# Patient Record
Sex: FEMALE | Race: WHITE | NOT HISPANIC OR LATINO | Employment: OTHER | URBAN - METROPOLITAN AREA
[De-identification: names, ages, dates, MRNs, and addresses within clinical notes are randomized per-mention and may not be internally consistent; named-entity substitution may affect disease eponyms.]

---

## 2022-03-10 ENCOUNTER — OFFICE VISIT (OUTPATIENT)
Dept: INTERNAL MEDICINE CLINIC | Facility: CLINIC | Age: 67
End: 2022-03-10
Payer: COMMERCIAL

## 2022-03-10 VITALS
WEIGHT: 151.4 LBS | BODY MASS INDEX: 23.76 KG/M2 | DIASTOLIC BLOOD PRESSURE: 92 MMHG | HEIGHT: 67 IN | TEMPERATURE: 98.4 F | HEART RATE: 85 BPM | SYSTOLIC BLOOD PRESSURE: 144 MMHG | RESPIRATION RATE: 16 BRPM | OXYGEN SATURATION: 97 %

## 2022-03-10 DIAGNOSIS — L40.50 PSORIATIC ARTHRITIS (HCC): ICD-10-CM

## 2022-03-10 DIAGNOSIS — I10 HYPERTENSION, ESSENTIAL, BENIGN: Primary | ICD-10-CM

## 2022-03-10 DIAGNOSIS — F51.04 PSYCHOPHYSIOLOGICAL INSOMNIA: ICD-10-CM

## 2022-03-10 DIAGNOSIS — L40.9 PSORIASIS: ICD-10-CM

## 2022-03-10 PROBLEM — I34.1 MITRAL VALVE PROLAPSE: Status: ACTIVE | Noted: 2022-03-10

## 2022-03-10 PROCEDURE — 1036F TOBACCO NON-USER: CPT | Performed by: INTERNAL MEDICINE

## 2022-03-10 PROCEDURE — 99204 OFFICE O/P NEW MOD 45 MIN: CPT | Performed by: INTERNAL MEDICINE

## 2022-03-10 PROCEDURE — 3008F BODY MASS INDEX DOCD: CPT | Performed by: INTERNAL MEDICINE

## 2022-03-10 RX ORDER — TURMERIC 400 MG
CAPSULE ORAL
COMMUNITY

## 2022-03-10 RX ORDER — DICYCLOMINE HYDROCHLORIDE 10 MG/1
CAPSULE ORAL
COMMUNITY

## 2022-03-10 RX ORDER — AMOXICILLIN 500 MG/1
CAPSULE ORAL
COMMUNITY

## 2022-03-10 RX ORDER — CLOBETASOL PROPIONATE 0.5 MG/G
CREAM TOPICAL 2 TIMES DAILY
COMMUNITY
End: 2022-04-05

## 2022-03-10 RX ORDER — ANTACID TABLETS 500 MG/1
TABLET, CHEWABLE ORAL
COMMUNITY

## 2022-03-10 RX ORDER — CHLORAL HYDRATE 500 MG
1 CAPSULE ORAL
COMMUNITY
End: 2022-04-05

## 2022-03-10 RX ORDER — TRAZODONE HYDROCHLORIDE 50 MG/1
25 TABLET ORAL
Qty: 90 TABLET | Refills: 1 | Status: SHIPPED | OUTPATIENT
Start: 2022-03-10

## 2022-03-10 RX ORDER — DILTIAZEM HYDROCHLORIDE 120 MG/1
120 CAPSULE, EXTENDED RELEASE ORAL DAILY
Qty: 90 CAPSULE | Refills: 1 | Status: SHIPPED | OUTPATIENT
Start: 2022-03-10 | End: 2022-04-05

## 2022-03-10 RX ORDER — UBIDECARENONE 100 MG
100 CAPSULE ORAL
COMMUNITY

## 2022-03-10 RX ORDER — DILTIAZEM HYDROCHLORIDE 120 MG/1
120 CAPSULE, EXTENDED RELEASE ORAL DAILY
COMMUNITY
Start: 2021-12-19 | End: 2022-03-10

## 2022-03-10 RX ORDER — CALCIUM CARBONATE/VITAMIN D3 500-10/5ML
LIQUID (ML) ORAL
COMMUNITY

## 2022-03-10 RX ORDER — TRAZODONE HYDROCHLORIDE 50 MG/1
25 TABLET ORAL
COMMUNITY
End: 2022-03-10 | Stop reason: SDUPTHER

## 2022-03-10 NOTE — PROGRESS NOTES
Assessment/Plan:    Hypertension, essential, benign  Controlled on diltiazem XR 120mg    Psoriatic arthritis (HCC)  Pain managed by dietary changes  She has been on MTX Humira    Psoriasis  Establishing with dermatology         Problem List Items Addressed This Visit        Cardiovascular and Mediastinum    Hypertension, essential, benign - Primary     Controlled on diltiazem XR 120mg         Relevant Medications    diltiazem (DILACOR XR) 120 MG 24 hr capsule    Other Relevant Orders    CBC and differential (Completed)    Comprehensive metabolic panel (Completed)    Lipid Panel with Direct LDL reflex (Completed)    UA w Reflex to Microscopic w Reflex to Culture (Completed)    Urine Microscopic (Completed)       Musculoskeletal and Integument    Psoriasis     Establishing with dermatology         Relevant Medications    clobetasol (TEMOVATE) 0 05 % cream    Pyrithione Zinc 0 25 % LIQD    Psoriatic arthritis (HCC)     Pain managed by dietary changes  She has been on MTX Humira         Relevant Medications    clobetasol (TEMOVATE) 0 05 % cream    Pyrithione Zinc 0 25 % LIQD    Other Relevant Orders    C-reactive protein (Completed)    Sedimentation rate, automated (Completed)       Other    Psychophysiological insomnia    Relevant Medications    traZODone (DESYREL) 50 mg tablet    Melatonin 5 MG CAPS            Subjective:      Patient ID: Cheryle Nunes is a 61 y o  female  HPI  Here to establish care  Psoriasis in her teens  Psoriatic arthritis diagnosed later and was placed on MTX which was not effective after long term use and in addition, she had infection  Humira caused hives  Stelara was costly   Eventually improved with dietary restrictions  Using topical tar products OTC  Sees dermatology in [place] but plans to see Dr Smith Mckeon in June  IBS both diarrhea and constipation controlled on psyllium  HTN on diltiazem ER 120mg  Dr Guerda Padron is ophthalmologoist  Colonoscopy in 2018 and recommended to repeat in 2025  Reports h/o  boils in the groin    The following portions of the patient's history were reviewed and updated as appropriate: allergies, current medications, past family history, past medical history, past social history, past surgical history and problem list     Review of Systems   Constitutional: Negative for chills and fever  Respiratory: Negative for shortness of breath  Cardiovascular: Negative for chest pain and palpitations  Gastrointestinal: Positive for constipation and diarrhea  Negative for abdominal pain  Genitourinary: Negative for difficulty urinating  Musculoskeletal: Negative for arthralgias  Neurological: Negative for dizziness and headaches  Psychiatric/Behavioral: Positive for sleep disturbance (takes melatonin and trazodone)  Objective:      /92   Pulse 85   Temp 98 4 °F (36 9 °C)   Resp 16   Ht 5' 6 5" (1 689 m)   Wt 68 7 kg (151 lb 6 4 oz)   SpO2 97%   BMI 24 07 kg/m²          Physical Exam  Constitutional:       General: She is not in acute distress  Appearance: She is well-developed  She is not ill-appearing, toxic-appearing or diaphoretic  HENT:      Head: Normocephalic and atraumatic  Eyes:      Conjunctiva/sclera: Conjunctivae normal    Cardiovascular:      Rate and Rhythm: Normal rate and regular rhythm  Heart sounds: Normal heart sounds  No murmur heard  Pulmonary:      Effort: Pulmonary effort is normal  No respiratory distress  Breath sounds: Normal breath sounds  No wheezing or rales  Abdominal:      General: There is no distension  Palpations: Abdomen is soft  There is no mass  Tenderness: There is no abdominal tenderness  There is no guarding or rebound  Musculoskeletal:      Cervical back: Neck supple  Right lower leg: No edema  Left lower leg: No edema     Skin:     Comments: Scaly pink plaques on her back, elbows   Neurological:      Mental Status: She is alert and oriented to person, place, and time  Psychiatric:         Mood and Affect: Mood normal          Behavior: Behavior normal          Thought Content:  Thought content normal          Judgment: Judgment normal

## 2022-03-14 ENCOUNTER — TELEPHONE (OUTPATIENT)
Dept: OTHER | Facility: OTHER | Age: 67
End: 2022-03-14

## 2022-03-14 ENCOUNTER — APPOINTMENT (OUTPATIENT)
Dept: LAB | Facility: CLINIC | Age: 67
End: 2022-03-14
Payer: COMMERCIAL

## 2022-03-14 LAB
ALBUMIN SERPL BCP-MCNC: 3.9 G/DL (ref 3.5–5)
ALP SERPL-CCNC: 79 U/L (ref 46–116)
ALT SERPL W P-5'-P-CCNC: 16 U/L (ref 12–78)
ANION GAP SERPL CALCULATED.3IONS-SCNC: 5 MMOL/L (ref 4–13)
AST SERPL W P-5'-P-CCNC: 14 U/L (ref 5–45)
BACTERIA UR QL AUTO: NORMAL /HPF
BASOPHILS # BLD AUTO: 0.05 THOUSANDS/ΜL (ref 0–0.1)
BASOPHILS NFR BLD AUTO: 1 % (ref 0–1)
BILIRUB SERPL-MCNC: 0.58 MG/DL (ref 0.2–1)
BILIRUB UR QL STRIP: NEGATIVE
BUN SERPL-MCNC: 13 MG/DL (ref 5–25)
CALCIUM SERPL-MCNC: 9.2 MG/DL (ref 8.3–10.1)
CHLORIDE SERPL-SCNC: 108 MMOL/L (ref 100–108)
CHOLEST SERPL-MCNC: 214 MG/DL
CLARITY UR: CLEAR
CO2 SERPL-SCNC: 26 MMOL/L (ref 21–32)
COLOR UR: ABNORMAL
CREAT SERPL-MCNC: 0.9 MG/DL (ref 0.6–1.3)
CRP SERPL QL: <3 MG/L
EOSINOPHIL # BLD AUTO: 0.06 THOUSAND/ΜL (ref 0–0.61)
EOSINOPHIL NFR BLD AUTO: 1 % (ref 0–6)
ERYTHROCYTE [DISTWIDTH] IN BLOOD BY AUTOMATED COUNT: 12.7 % (ref 11.6–15.1)
ERYTHROCYTE [SEDIMENTATION RATE] IN BLOOD: 9 MM/HOUR (ref 0–29)
GFR SERPL CREATININE-BSD FRML MDRD: 67 ML/MIN/1.73SQ M
GLUCOSE P FAST SERPL-MCNC: 101 MG/DL (ref 65–99)
GLUCOSE UR STRIP-MCNC: NEGATIVE MG/DL
HCT VFR BLD AUTO: 45.1 % (ref 34.8–46.1)
HDLC SERPL-MCNC: 104 MG/DL
HGB BLD-MCNC: 14.8 G/DL (ref 11.5–15.4)
HGB UR QL STRIP.AUTO: ABNORMAL
IMM GRANULOCYTES # BLD AUTO: 0.01 THOUSAND/UL (ref 0–0.2)
IMM GRANULOCYTES NFR BLD AUTO: 0 % (ref 0–2)
KETONES UR STRIP-MCNC: NEGATIVE MG/DL
LDLC SERPL CALC-MCNC: 97 MG/DL (ref 0–100)
LEUKOCYTE ESTERASE UR QL STRIP: NEGATIVE
LYMPHOCYTES # BLD AUTO: 1.54 THOUSANDS/ΜL (ref 0.6–4.47)
LYMPHOCYTES NFR BLD AUTO: 30 % (ref 14–44)
MCH RBC QN AUTO: 31.4 PG (ref 26.8–34.3)
MCHC RBC AUTO-ENTMCNC: 32.8 G/DL (ref 31.4–37.4)
MCV RBC AUTO: 96 FL (ref 82–98)
MONOCYTES # BLD AUTO: 0.49 THOUSAND/ΜL (ref 0.17–1.22)
MONOCYTES NFR BLD AUTO: 9 % (ref 4–12)
NEUTROPHILS # BLD AUTO: 3.06 THOUSANDS/ΜL (ref 1.85–7.62)
NEUTS SEG NFR BLD AUTO: 59 % (ref 43–75)
NITRITE UR QL STRIP: NEGATIVE
NON-SQ EPI CELLS URNS QL MICRO: NORMAL /HPF
NRBC BLD AUTO-RTO: 0 /100 WBCS
PH UR STRIP.AUTO: 6.5 [PH]
PLATELET # BLD AUTO: 323 THOUSANDS/UL (ref 149–390)
PMV BLD AUTO: 9.3 FL (ref 8.9–12.7)
POTASSIUM SERPL-SCNC: 3.9 MMOL/L (ref 3.5–5.3)
PROT SERPL-MCNC: 7.2 G/DL (ref 6.4–8.2)
PROT UR STRIP-MCNC: NEGATIVE MG/DL
RBC # BLD AUTO: 4.72 MILLION/UL (ref 3.81–5.12)
RBC #/AREA URNS AUTO: NORMAL /HPF
SODIUM SERPL-SCNC: 139 MMOL/L (ref 136–145)
SP GR UR STRIP.AUTO: 1.01 (ref 1–1.03)
TRIGL SERPL-MCNC: 63 MG/DL
UROBILINOGEN UR STRIP-ACNC: <2 MG/DL
WBC # BLD AUTO: 5.21 THOUSAND/UL (ref 4.31–10.16)
WBC #/AREA URNS AUTO: NORMAL /HPF

## 2022-03-14 PROCEDURE — 85025 COMPLETE CBC W/AUTO DIFF WBC: CPT | Performed by: INTERNAL MEDICINE

## 2022-03-14 PROCEDURE — 80061 LIPID PANEL: CPT | Performed by: INTERNAL MEDICINE

## 2022-03-14 PROCEDURE — 86140 C-REACTIVE PROTEIN: CPT | Performed by: INTERNAL MEDICINE

## 2022-03-14 PROCEDURE — 81001 URINALYSIS AUTO W/SCOPE: CPT | Performed by: INTERNAL MEDICINE

## 2022-03-14 PROCEDURE — 85652 RBC SED RATE AUTOMATED: CPT | Performed by: INTERNAL MEDICINE

## 2022-03-14 PROCEDURE — 36415 COLL VENOUS BLD VENIPUNCTURE: CPT | Performed by: INTERNAL MEDICINE

## 2022-03-14 PROCEDURE — 80053 COMPREHEN METABOLIC PANEL: CPT | Performed by: INTERNAL MEDICINE

## 2022-03-14 NOTE — TELEPHONE ENCOUNTER
Pt called in to discuss the results of her blood work and urinalysis  She is requesting a call back from the office

## 2022-03-15 PROBLEM — F51.04 PSYCHOPHYSIOLOGICAL INSOMNIA: Status: ACTIVE | Noted: 2022-03-15

## 2022-03-16 ENCOUNTER — TELEPHONE (OUTPATIENT)
Dept: ADMINISTRATIVE | Facility: OTHER | Age: 67
End: 2022-03-16

## 2022-03-16 NOTE — TELEPHONE ENCOUNTER
Upon review of the In Basket request we were able to locate, review, and update the patient chart as requested for Mammogram     Any additional questions or concerns should be emailed to the Practice Liaisons via [email or web address] email, please do not reply via In Basket      Thank you  Magdy Sánchez

## 2022-03-16 NOTE — TELEPHONE ENCOUNTER
----- Message from Paola Golden MD sent at 3/15/2022 11:28 PM EDT -----  03/15/22 11:28 PM    Hello, our patient Jordan Rodgers has had Mammogram completed/performed  Please assist in updating the patient chart by pulling the Care Everywhere (CE) document  The date of service is May 2021       Thank you,  Paola Golden MD  [facility]

## 2022-03-19 ENCOUNTER — OFFICE VISIT (OUTPATIENT)
Dept: URGENT CARE | Facility: CLINIC | Age: 67
End: 2022-03-19
Payer: COMMERCIAL

## 2022-03-19 VITALS
RESPIRATION RATE: 20 BRPM | TEMPERATURE: 99.5 F | SYSTOLIC BLOOD PRESSURE: 160 MMHG | HEART RATE: 67 BPM | WEIGHT: 150 LBS | HEIGHT: 66 IN | DIASTOLIC BLOOD PRESSURE: 97 MMHG | OXYGEN SATURATION: 99 % | BODY MASS INDEX: 24.11 KG/M2

## 2022-03-19 DIAGNOSIS — R30.0 DYSURIA: Primary | ICD-10-CM

## 2022-03-19 LAB
SL AMB  POCT GLUCOSE, UA: NEGATIVE
SL AMB LEUKOCYTE ESTERASE,UA: ABNORMAL
SL AMB POCT BILIRUBIN,UA: NEGATIVE
SL AMB POCT BLOOD,UA: ABNORMAL
SL AMB POCT CLARITY,UA: ABNORMAL
SL AMB POCT COLOR,UA: YELLOW
SL AMB POCT KETONES,UA: NEGATIVE
SL AMB POCT NITRITE,UA: NEGATIVE
SL AMB POCT PH,UA: 6.5
SL AMB POCT SPECIFIC GRAVITY,UA: 1.01
SL AMB POCT URINE PROTEIN: NEGATIVE
SL AMB POCT UROBILINOGEN: 0.2

## 2022-03-19 PROCEDURE — 87186 SC STD MICRODIL/AGAR DIL: CPT | Performed by: PREVENTIVE MEDICINE

## 2022-03-19 PROCEDURE — 87086 URINE CULTURE/COLONY COUNT: CPT | Performed by: PREVENTIVE MEDICINE

## 2022-03-19 PROCEDURE — 87077 CULTURE AEROBIC IDENTIFY: CPT | Performed by: PREVENTIVE MEDICINE

## 2022-03-19 PROCEDURE — 81002 URINALYSIS NONAUTO W/O SCOPE: CPT | Performed by: PREVENTIVE MEDICINE

## 2022-03-19 PROCEDURE — S9083 URGENT CARE CENTER GLOBAL: HCPCS | Performed by: PREVENTIVE MEDICINE

## 2022-03-19 PROCEDURE — G0382 LEV 3 HOSP TYPE B ED VISIT: HCPCS | Performed by: PREVENTIVE MEDICINE

## 2022-03-19 RX ORDER — PHENAZOPYRIDINE HYDROCHLORIDE 100 MG/1
100 TABLET, FILM COATED ORAL 3 TIMES DAILY PRN
Qty: 10 TABLET | Refills: 0 | Status: SHIPPED | OUTPATIENT
Start: 2022-03-19

## 2022-03-19 RX ORDER — DILTIAZEM HYDROCHLORIDE 120 MG/1
120 CAPSULE, EXTENDED RELEASE ORAL DAILY
COMMUNITY
Start: 2022-03-11

## 2022-03-19 RX ORDER — NITROFURANTOIN 25; 75 MG/1; MG/1
100 CAPSULE ORAL 2 TIMES DAILY
Qty: 10 CAPSULE | Refills: 0 | Status: SHIPPED | OUTPATIENT
Start: 2022-03-19 | End: 2022-03-24

## 2022-03-19 NOTE — PROGRESS NOTES
[facility]        NAME: Precious Villa is a 61 y o  female  : 1955    MRN: [id number]  DATE: 2022  TIME: 10:12 AM    Assessment and Plan   Dysuria [R30 0]  1  Dysuria  POCT urine dip    Urine culture         Patient Instructions       Follow up with PCP in 3-5 days  Proceed to  ER if symptoms worsen  Chief Complaint     Chief Complaint   Patient presents with    Urinary Tract Infection     started yesterday with urgency and burning with urination, slight flank pain and abdominal pressure, no meds taken, pt does have hx of UTI, no fevers or cold symptoms           History of Present Illness       Burning and stinging of urine x2 days  No back pain no fever  Review of Systems   Review of Systems   Genitourinary: Positive for dysuria and frequency  Negative for flank pain           Current Medications       Current Outpatient Medications:     amoxicillin (AMOXIL) 500 mg capsule, , Disp: , Rfl:     Calcium Carbonate 500 MG CHEW, Chew, Disp: , Rfl:     Cholecalciferol 50 MCG (2000 UT) CAPS, Take 2,000 Units by mouth, Disp: , Rfl:     clobetasol (TEMOVATE) 0 05 % cream, Apply topically 2 (two) times a day, Disp: , Rfl:     co-enzyme Q-10 100 mg capsule, Take 100 mg by mouth, Disp: , Rfl:     dicyclomine (BENTYL) 10 mg capsule, , Disp: , Rfl:     diltiazem (DILACOR XR) 120 MG 24 hr capsule, Take 1 capsule (120 mg total) by mouth daily, Disp: 90 capsule, Rfl: 1    FLUTICASONE PROPIONATE, NASAL, NA, , Disp: , Rfl:     Magnesium Oxide 400 MG CAPS, Take by mouth, Disp: , Rfl:     Melatonin 5 MG CAPS, Take 1 capsule (5 mg total) by mouth daily at bedtime as needed (sleep), Disp: , Rfl: 0    Omega-3 Fatty Acids (fish oil) 1,000 mg, Take 1 g by mouth, Disp: , Rfl:     Potassium 99 MG TABS, Take by mouth, Disp: , Rfl:     psyllium (METAMUCIL) 58 6 % packet, Take 1 packet by mouth daily, Disp: , Rfl:     Pyrithione Zinc 0 25 % LIQD, Apply topically, Disp: , Rfl:     Tiadylt ER 120 MG 24 hr capsule, Take 120 mg by mouth daily, Disp: , Rfl:     traZODone (DESYREL) 50 mg tablet, Take 0 5 tablets (25 mg total) by mouth daily at bedtime as needed for sleep, Disp: 90 tablet, Rfl: 1    Turmeric 400 MG CAPS, Take by mouth, Disp: , Rfl:     Current Allergies     Allergies as of 03/19/2022 - Reviewed 03/19/2022   Allergen Reaction Noted    Albumen, egg - food allergy Other (See Comments) 07/07/2021    Casein - food allergy Other (See Comments) 07/07/2021    Gluten meal - food allergy Other (See Comments) 07/07/2021    Milk-related compounds - food allergy Other (See Comments) 07/07/2021    Other Other (See Comments) 07/07/2021    Sulfa antibiotics Hives 03/10/2022            The following portions of the patient's history were reviewed and updated as appropriate: allergies, current medications, past family history, past medical history, past social history, past surgical history and problem list      Past Medical History:   Diagnosis Date    Psoriasis     Psoriatic arthritis ([facility] [address] )        Past Surgical History:   Procedure Laterality Date    JOINT REPLACEMENT Left 2016    LEFT OOPHORECTOMY Left     suspicous cyst that was benign       Family History   Problem Relation Age of Onset    Colon cancer Maternal Grandmother     Supraventricular tachycardia Mother     Psoriasis Mother     Psoriatic Arthritis Mother     Hypertension Mother     Dementia Mother     Squamous cell carcinoma Mother         skin         Medications have been verified  Objective   /97   Pulse 67   Temp 99 5 °F (37 5 °C) (Tympanic)   Resp 20   Ht 5' 6" (1 676 m)   Wt 68 kg (150 lb)   SpO2 99%   BMI 24 21 kg/m²   No LMP recorded         Physical Exam     Physical Exam  Genitourinary:     Comments: No CVA tenderness to percussion

## 2022-03-21 LAB — BACTERIA UR CULT: ABNORMAL

## 2022-03-25 ENCOUNTER — OFFICE VISIT (OUTPATIENT)
Dept: URGENT CARE | Facility: CLINIC | Age: 67
End: 2022-03-25
Payer: COMMERCIAL

## 2022-03-25 VITALS — WEIGHT: 150 LBS | BODY MASS INDEX: 24.11 KG/M2 | HEIGHT: 66 IN

## 2022-03-25 DIAGNOSIS — R39.9 UTI SYMPTOMS: Primary | ICD-10-CM

## 2022-03-25 PROCEDURE — 87086 URINE CULTURE/COLONY COUNT: CPT | Performed by: PHYSICIAN ASSISTANT

## 2022-03-25 PROCEDURE — 99213 OFFICE O/P EST LOW 20 MIN: CPT | Performed by: PHYSICIAN ASSISTANT

## 2022-03-25 NOTE — PROGRESS NOTES
[facility]        NAME: Cady Finch is a 61 y o  female  : 1955    MRN: [id number]  DATE: 2022  TIME: 11:07 AM    Assessment and Plan   UTI symptoms [R39 9]  1  UTI symptoms  Urine culture    CANCELED: POCT urine dip auto non-scope         Patient Instructions     Discussed symptoms with patient  She recently completed antibiotic therapy for UTI  Will send out culture to further evaluate for any further bacterial growth  In the interim, recommend hydration, rest, close observation  Follow up with PCP in 3-5 days  Proceed to  ER if symptoms worsen  Chief Complaint     Chief Complaint   Patient presents with    Possible UTI     "I think I got relief" from UTI last week  Last night noticed aa "heaviness" to suprapubic area and burning upon urination  History of Present Illness       Patient presents after previously being treated for UTI with oral antibiotics which she has finished  She reports that she felt better and then last night she started having heaviness in her pelvic area as well as dysuria  She denies any other significant symptoms at this time  She is trying her best adequately hydrate  Review of Systems   Review of Systems   Constitutional: Negative  Respiratory: Negative  Cardiovascular: Negative  Gastrointestinal: Negative  Genitourinary: Positive for dysuria and pelvic pain  Negative for flank pain, hematuria and vaginal discharge           Current Medications       Current Outpatient Medications:     amoxicillin (AMOXIL) 500 mg capsule, Prior to dental appts , Disp: , Rfl:     Calcium Carbonate 500 MG CHEW, Chew, Disp: , Rfl:     Cholecalciferol 50 MCG (2000) CAPS, Take 2,000 Units by mouth, Disp: , Rfl:     clobetasol (TEMOVATE) 0 05 % cream, Apply topically 2 (two) times a day, Disp: , Rfl:     co-enzyme Q-10 100 mg capsule, Take 100 mg by mouth, Disp: , Rfl:     dicyclomine (BENTYL) 10 mg capsule, , Disp: , Rfl:    diltiazem (DILACOR XR) 120 MG 24 hr capsule, Take 1 capsule (120 mg total) by mouth daily, Disp: 90 capsule, Rfl: 1    FLUTICASONE PROPIONATE, NASAL, NA, , Disp: , Rfl:     Magnesium Oxide 400 MG CAPS, Take by mouth, Disp: , Rfl:     Melatonin 5 MG CAPS, Take 1 capsule (5 mg total) by mouth daily at bedtime as needed (sleep), Disp: , Rfl: 0    Omega-3 Fatty Acids (fish oil) 1,000 mg, Take 1 g by mouth, Disp: , Rfl:     Potassium 99 MG TABS, Take by mouth, Disp: , Rfl:     psyllium (METAMUCIL) 58 6 % packet, Take 1 packet by mouth daily, Disp: , Rfl:     Tiadylt  MG 24 hr capsule, Take 120 mg by mouth daily, Disp: , Rfl:     traZODone (DESYREL) 50 mg tablet, Take 0 5 tablets (25 mg total) by mouth daily at bedtime as needed for sleep, Disp: 90 tablet, Rfl: 1    Turmeric 400 MG CAPS, Take by mouth, Disp: , Rfl:     phenazopyridine (PYRIDIUM) 100 mg tablet, Take 1 tablet (100 mg total) by mouth 3 (three) times a day as needed for bladder spasms (Patient not taking: Reported on 3/25/2022 ), Disp: 10 tablet, Rfl: 0    Pyrithione Zinc 0 25 % LIQD, Apply topically (Patient not taking: Reported on 3/25/2022 ), Disp: , Rfl:     Current Allergies     Allergies as of 03/25/2022 - Reviewed 03/25/2022   Allergen Reaction Noted    Albumen, egg - food allergy Other (See Comments) 07/07/2021    Casein - food allergy Other (See Comments) 07/07/2021    Gluten meal - food allergy Other (See Comments) 07/07/2021    Milk-related compounds - food allergy Other (See Comments) 07/07/2021    Other Other (See Comments) 07/07/2021    Sulfa antibiotics Hives 03/10/2022            The following portions of the patient's history were reviewed and updated as appropriate: allergies, current medications, past family history, past medical history, past social history, past surgical history and problem list      Past Medical History:   Diagnosis Date    Psoriasis     [facility]atic arthritis ([facility] [address] )        Past Surgical History: Procedure Laterality Date    JOINT REPLACEMENT Left 2016    LEFT OOPHORECTOMY Left     suspicous cyst that was benign       Family History   Problem Relation Age of Onset    Colon cancer Maternal Grandmother     Supraventricular tachycardia Mother     Psoriasis Mother     Psoriatic Arthritis Mother     Hypertension Mother     Dementia Mother     Squamous cell carcinoma Mother         skin         Medications have been verified  Objective   Ht 5' 6" (1 676 m)   Wt 68 kg (150 lb)   BMI 24 21 kg/m²   No LMP recorded  Physical Exam     Physical Exam  Vitals reviewed  Constitutional:       General: She is not in acute distress  Appearance: She is well-developed  HENT:      Mouth/Throat:      Mouth: Mucous membranes are moist       Pharynx: Oropharynx is clear  Cardiovascular:      Rate and Rhythm: Normal rate and regular rhythm  Pulses: Normal pulses  Heart sounds: Normal heart sounds  No murmur heard  Pulmonary:      Effort: Pulmonary effort is normal  No respiratory distress  Breath sounds: Normal breath sounds  Abdominal:      Tenderness: There is no right CVA tenderness or left CVA tenderness  Neurological:      Mental Status: She is alert and oriented to person, place, and time

## 2022-03-25 NOTE — PATIENT INSTRUCTIONS
Urinary Tract Infection in Women   WHAT YOU NEED TO KNOW:   A urinary tract infection (UTI) is caused by bacteria that get inside your urinary tract  Most bacteria that enter your urinary tract come out when you urinate  If the bacteria stay in your urinary tract, you may get an infection  Your urinary tract includes your kidneys, ureters, bladder, and urethra  Urine is made in your kidneys, and it flows from the ureters to the bladder  Urine leaves the bladder through the urethra  A UTI is more common in your lower urinary tract, which includes your bladder and urethra  DISCHARGE INSTRUCTIONS:   Return to the emergency department if:   · You are urinating very little or not at all  · You have a high fever with shaking chills  · You have side or back pain that gets worse  Call your doctor if:   · You have a fever  · You do not feel better after 2 days of taking antibiotics  · You are vomiting  · You have questions or concerns about your condition or care  Medicines:   · Antibiotics  help fight a bacterial infection  If you have UTIs often (called recurrent UTIs), you may be given antibiotics to take regularly  You will be given directions for when and how to use antibiotics  The goal is to prevent UTIs but not cause antibiotic resistance by using antibiotics too often  · Medicines  may be given to decrease pain and burning when you urinate  They will also help decrease the feeling that you need to urinate often  These medicines will make your urine orange or red  · Take your medicine as directed  Contact your healthcare provider if you think your medicine is not helping or if you have side effects  Tell him or her if you are allergic to any medicine  Keep a list of the medicines, vitamins, and herbs you take  Include the amounts, and when and why you take them  Bring the list or the pill bottles to follow-up visits   Carry your medicine list with you in case of an emergency  Follow up with your doctor as directed:  Write down your questions so you remember to ask them during your visits  Prevent another UTI:   · Empty your bladder often  Urinate and empty your bladder as soon as you feel the need  Do not hold your urine for long periods of time  · Wipe from front to back after you urinate or have a bowel movement  This will help prevent germs from getting into your urinary tract through your urethra  · Drink liquids as directed  Ask how much liquid to drink each day and which liquids are best for you  You may need to drink more liquids than usual to help flush out the bacteria  Do not drink alcohol, caffeine, or citrus juices  These can irritate your bladder and increase your symptoms  Your healthcare provider may recommend cranberry juice to help prevent a UTI  · Urinate after you have sex  This can help flush out bacteria passed during sex  · Do not douche or use feminine deodorants  These can change the chemical balance in your vagina  · Change sanitary pads or tampons often  This will help prevent germs from getting into your urinary tract  · Talk to your healthcare provider about your birth control method  You may need to change your method if it is increasing your risk for UTIs  · Wear cotton underwear and clothes that are loose  Tight pants and nylon underwear can trap moisture and cause bacteria to grow  · Vaginal estrogen may be recommended  This medicine helps prevent UTIs in women who have gone through menopause or are in feng-menopause  · Do pelvic muscle exercises often  Pelvic muscle exercises may help you start and stop urinating  Strong pelvic muscles may help you empty your bladder easier  Squeeze these muscles tightly for 5 seconds like you are trying to hold back urine  Then relax for 5 seconds  Gradually work up to squeezing for 10 seconds  Do 3 sets of 15 repetitions a day, or as directed      © Copyright [employer] 2022 Information is for [employer] use only and may not be sold, redistributed or otherwise used for commercial purposes  All illustrations and images included in CareNotes® are the copyrighted property of [employer]  or Traci Thakkar  The above information is an  only  It is not intended as medical advice for individual conditions or treatments  Talk to your doctor, nurse or pharmacist before following any medical regimen to see if it is safe and effective for you

## 2022-03-26 LAB — BACTERIA UR CULT: NORMAL

## 2022-04-05 ENCOUNTER — OFFICE VISIT (OUTPATIENT)
Dept: OBGYN CLINIC | Facility: CLINIC | Age: 67
End: 2022-04-05
Payer: COMMERCIAL

## 2022-04-05 VITALS
HEIGHT: 66 IN | SYSTOLIC BLOOD PRESSURE: 142 MMHG | WEIGHT: 152 LBS | DIASTOLIC BLOOD PRESSURE: 80 MMHG | BODY MASS INDEX: 24.43 KG/M2

## 2022-04-05 DIAGNOSIS — Z11.51 SCREENING FOR HPV (HUMAN PAPILLOMAVIRUS): ICD-10-CM

## 2022-04-05 DIAGNOSIS — Z01.419 WELL FEMALE EXAM WITH ROUTINE GYNECOLOGICAL EXAM: Primary | ICD-10-CM

## 2022-04-05 DIAGNOSIS — Z12.31 ENCOUNTER FOR SCREENING MAMMOGRAM FOR MALIGNANT NEOPLASM OF BREAST: ICD-10-CM

## 2022-04-05 DIAGNOSIS — Z12.4 SCREENING FOR MALIGNANT NEOPLASM OF CERVIX: ICD-10-CM

## 2022-04-05 PROBLEM — M21.611 BILATERAL BUNIONS: Status: ACTIVE | Noted: 2022-04-05

## 2022-04-05 PROBLEM — Z82.0 FAMILY HISTORY OF NEUROPATHY: Status: RESOLVED | Noted: 2022-04-05 | Resolved: 2022-04-05

## 2022-04-05 PROBLEM — Z82.49 FAMILY HISTORY OF HEART ATTACK: Status: ACTIVE | Noted: 2022-04-05

## 2022-04-05 PROBLEM — Z80.0 FAMILY HISTORY OF COLON CANCER: Status: ACTIVE | Noted: 2022-04-05

## 2022-04-05 PROBLEM — Z80.51 FAMILY HISTORY OF KIDNEY CANCER: Status: ACTIVE | Noted: 2022-04-05

## 2022-04-05 PROBLEM — Z83.79 FAMILY HISTORY OF GALLSTONES: Status: ACTIVE | Noted: 2022-04-05

## 2022-04-05 PROBLEM — R92.2 DENSE BREAST TISSUE: Status: ACTIVE | Noted: 2022-04-05

## 2022-04-05 PROBLEM — Z82.0 FAMILY HISTORY OF NEUROPATHY: Status: ACTIVE | Noted: 2022-04-05

## 2022-04-05 PROBLEM — Z96.659 HISTORY OF PARTIAL KNEE REPLACEMENT: Status: ACTIVE | Noted: 2022-04-05

## 2022-04-05 PROBLEM — Z83.518 FAMILY HISTORY OF MACULAR DEGENERATION: Status: ACTIVE | Noted: 2022-04-05

## 2022-04-05 PROBLEM — M21.612 BILATERAL BUNIONS: Status: ACTIVE | Noted: 2022-04-05

## 2022-04-05 PROBLEM — R92.30 DENSE BREAST TISSUE: Status: ACTIVE | Noted: 2022-04-05

## 2022-04-05 PROBLEM — Z82.5 FAMILY HISTORY OF COPD (CHRONIC OBSTRUCTIVE PULMONARY DISEASE): Status: ACTIVE | Noted: 2022-04-05

## 2022-04-05 PROBLEM — Z83.79 FAMILY HISTORY OF GALLSTONES: Status: RESOLVED | Noted: 2022-04-05 | Resolved: 2022-04-05

## 2022-04-05 PROCEDURE — G0476 HPV COMBO ASSAY CA SCREEN: HCPCS | Performed by: OBSTETRICS & GYNECOLOGY

## 2022-04-05 PROCEDURE — G0145 SCR C/V CYTO,THINLAYER,RESCR: HCPCS | Performed by: OBSTETRICS & GYNECOLOGY

## 2022-04-05 PROCEDURE — S0610 ANNUAL GYNECOLOGICAL EXAMINA: HCPCS | Performed by: OBSTETRICS & GYNECOLOGY

## 2022-04-05 PROCEDURE — 3008F BODY MASS INDEX DOCD: CPT | Performed by: INTERNAL MEDICINE

## 2022-04-05 NOTE — PROGRESS NOTES
ASSESSMENT & PLAN:   Diagnoses and all orders for this visit:    Well female exam with routine gynecological exam  -     Liquid-based pap, screening    Screening for malignant neoplasm of cervix  -     Liquid-based pap, screening    Screening for HPV (human papillomavirus)  -     Liquid-based pap, screening    Encounter for screening mammogram for malignant neoplasm of breast  -     Mammo screening bilateral w 3d & cad; Future          The following were reviewed in today's visit: ASCCP guidelines,  breast self exam, mammography screening ordered, menopause, exercise and healthy diet  Patient to return to office in yearly for annual exam      All questions have been answered to her satisfaction  CC:  Annual Gynecologic Examination  Chief Complaint   Patient presents with    [facility]     new pt     Gynecologic Exam     no records of pap, mammo, DXA or colonoscopy  HPI: Annabella Sneed is a 61 y o  [id number] who presents for annual gynecologic examination  She has the following concerns:  Dense breast tissue, occ abd pain      Health Maintenance:    Exercise: intermittently  Breast exams/breast awareness: no  Diet: well balanced diet  Last mammogram: 2021        Past Medical History:   Diagnosis Date    Heart valve disease     Mitro valve prolapse    Hypertension     Psoriasis     Psoriatic arthritis ([facility] [address]     Varicella        Past Surgical History:   Procedure Laterality Date    JOINT REPLACEMENT Left 2016    LEFT OOPHORECTOMY Left     benign cyst       Past OB/Gyn History:  Period Cycle (Days):  (n/a post menopausal)No LMP recorded  Patient is postmenopausal     Menopausal status: postmenopausal  Menopausal symptoms: None    Last Pap: 2020 : no abnormalities  History of abnormal Pap smear: yes - distant pass    Patient is not currently sexually active     STD testing: no  Current contraception: none      Family History  Family History   Problem Relation Age of Onset    Colon cancer Maternal Grandmother     Supraventricular tachycardia Mother     Psoriasis Mother     Psoriatic Arthritis Mother     Hypertension Mother     Dementia Mother     Squamous cell carcinoma Mother         skin    Hypertension Father        Family history of uterine or ovarian cancer: no  Family history of breast cancer: no  Family history of colon cancer: yes    Social History:  Social History     Socioeconomic History    Marital status:      Spouse name: Not on file    Number of children: Not on file    Years of education: Not on file    Highest education level: Not on file   Occupational History    Occupation: retired   Tobacco Use    Smoking status: Never Smoker    Smokeless tobacco: Never Used   Vaping Use    Vaping Use: Never used   Substance and Sexual Activity    Alcohol use: Yes     Alcohol/week: 1 0 standard drink     Types: 1 Glasses of wine per week     Comment: With dinner    Drug use: Not Currently     Comment: Many years ago    Sexual activity: Not Currently     Partners: Male     Birth control/protection: Post-menopausal   Other Topics Concern    Not on file   Social History Narrative    Not on file     Social Determinants of Health     Financial Resource Strain: Not on file   Food Insecurity: Not on file   Transportation Needs: Not on file   Physical Activity: Not on file   Stress: Not on file   Social Connections: Not on file   Intimate Partner Violence: Not on file   Housing Stability: Not on file     Domestic violence screen: negative    Allergies:   Allergies   Allergen Reactions    Albumen, Egg - Food Allergy Other (See Comments)    Casein - Food Allergy Other (See Comments)    Gluten Meal - Food Allergy Other (See Comments)    Milk-Related Compounds - Food Allergy Other (See Comments)    Other Other (See Comments)     SEASONAL    Sulfa Antibiotics Hives       Medications:    Current Outpatient Medications:     amoxicillin (AMOXIL) 500 mg capsule, Prior to dental appts , Disp: , Rfl:     Calcium Carbonate 500 MG CHEW, Chew, Disp: , Rfl:     Cholecalciferol 50 MCG (2000 UT) CAPS, Take 2,000 Units by mouth, Disp: , Rfl:     co-enzyme Q-10 100 mg capsule, Take 100 mg by mouth, Disp: , Rfl:     dicyclomine (BENTYL) 10 mg capsule, , Disp: , Rfl:     FLUTICASONE PROPIONATE, NASAL, NA, , Disp: , Rfl:     Magnesium Oxide 400 MG CAPS, Take by mouth, Disp: , Rfl:     Melatonin 5 MG CAPS, Take 1 capsule (5 mg total) by mouth daily at bedtime as needed (sleep), Disp: , Rfl: 0    phenazopyridine (PYRIDIUM) 100 mg tablet, Take 1 tablet (100 mg total) by mouth 3 (three) times a day as needed for bladder spasms, Disp: 10 tablet, Rfl: 0    Potassium 99 MG TABS, Take by mouth, Disp: , Rfl:     psyllium (METAMUCIL) 58 6 % packet, Take 1 packet by mouth daily, Disp: , Rfl:     Tiadylt  MG 24 hr capsule, Take 120 mg by mouth daily, Disp: , Rfl:     traZODone (DESYREL) 50 mg tablet, Take 0 5 tablets (25 mg total) by mouth daily at bedtime as needed for sleep, Disp: 90 tablet, Rfl: 1    Turmeric 400 MG CAPS, Take by mouth, Disp: , Rfl:     Review of Systems:  Review of Systems   Constitutional: Negative  HENT: Negative  Respiratory: Negative  Cardiovascular: Negative  Gastrointestinal: Negative  Genitourinary: Negative  Psychiatric/Behavioral: Negative  Physical Exam:  /80   Ht 5' 6" (1 676 m)   Wt 68 9 kg (152 lb)   Breastfeeding No   BMI 24 53 kg/m²    Physical Exam  Constitutional:       Appearance: Normal appearance  Genitourinary:      Bladder and urethral meatus normal       No lesions in the vagina  Right Labia: No rash, tenderness, lesions, skin changes or Bartholin's cyst      Left Labia: No tenderness, lesions, skin changes, Bartholin's cyst or rash  No vaginal erythema, tenderness or bleeding  Right Adnexa: not tender, not full and no mass present  Left Adnexa: not tender, not full and no mass present  Cervix is parous  No cervical discharge, lesion or polyp  Uterus is not enlarged, fixed or tender  No uterine mass detected  Urethral meatus caruncle present  No urethral tenderness or mass present  Breasts:      Right: No swelling, bleeding, inverted nipple, mass, nipple discharge, skin change or tenderness  Left: No swelling, bleeding, inverted nipple, mass, nipple discharge, skin change or tenderness  HENT:      Head: Normocephalic and atraumatic  Eyes:      Extraocular Movements: Extraocular movements intact  Conjunctiva/sclera: Conjunctivae normal       Pupils: Pupils are equal, round, and reactive to light  Cardiovascular:      Rate and Rhythm: Normal rate and regular rhythm  Heart sounds: Normal heart sounds  No murmur heard  Pulmonary:      Effort: Pulmonary effort is normal  No respiratory distress  Breath sounds: Normal breath sounds  No wheezing or rales  Abdominal:      General: There is no distension  Palpations: Abdomen is soft  Tenderness: There is no abdominal tenderness  There is no guarding  Neurological:      General: No focal deficit present  Mental Status: She is alert and oriented to person, place, and time  Skin:     General: Skin is warm and dry  Psychiatric:         Mood and Affect: Mood normal          Behavior: Behavior normal    Vitals and nursing note reviewed

## 2022-04-10 LAB
HPV HR 12 DNA CVX QL NAA+PROBE: NEGATIVE
HPV16 DNA CVX QL NAA+PROBE: NEGATIVE
HPV18 DNA CVX QL NAA+PROBE: NEGATIVE

## 2022-04-13 LAB
LAB AP GYN PRIMARY INTERPRETATION: NORMAL
Lab: NORMAL

## 2022-04-14 NOTE — RESULT ENCOUNTER NOTE
Neg pap, neg HPV - routine screening - released to [facility]

## 2022-04-27 ENCOUNTER — TELEPHONE (OUTPATIENT)
Dept: OTHER | Facility: OTHER | Age: 67
End: 2022-04-27

## 2022-04-27 NOTE — TELEPHONE ENCOUNTER
Pharmacy is calling in for prescription for patient for Flonase (generic)  Previously prescribed by provider in [place]; patient requesting PCP prescribe for her    Please follow up with pharmacy

## 2022-07-24 DIAGNOSIS — J30.9 ALLERGIC RHINITIS, UNSPECIFIED SEASONALITY, UNSPECIFIED TRIGGER: ICD-10-CM

## 2022-07-24 RX ORDER — FLUTICASONE PROPIONATE 50 MCG
SPRAY, SUSPENSION (ML) NASAL
Qty: 48 ML | Refills: 1 | Status: SHIPPED | OUTPATIENT
Start: 2022-07-24

## 2022-08-18 ENCOUNTER — OFFICE VISIT (OUTPATIENT)
Dept: URGENT CARE | Facility: CLINIC | Age: 67
End: 2022-08-18
Payer: COMMERCIAL

## 2022-08-18 VITALS
DIASTOLIC BLOOD PRESSURE: 98 MMHG | HEART RATE: 81 BPM | BODY MASS INDEX: 24.91 KG/M2 | SYSTOLIC BLOOD PRESSURE: 140 MMHG | TEMPERATURE: 98.5 F | HEIGHT: 66 IN | OXYGEN SATURATION: 98 % | WEIGHT: 155 LBS | RESPIRATION RATE: 16 BRPM

## 2022-08-18 DIAGNOSIS — J30.9 ALLERGIC RHINITIS, UNSPECIFIED SEASONALITY, UNSPECIFIED TRIGGER: ICD-10-CM

## 2022-08-18 DIAGNOSIS — J01.90 ACUTE SINUSITIS, RECURRENCE NOT SPECIFIED, UNSPECIFIED LOCATION: Primary | ICD-10-CM

## 2022-08-18 PROCEDURE — 99213 OFFICE O/P EST LOW 20 MIN: CPT | Performed by: PHYSICIAN ASSISTANT

## 2022-08-18 RX ORDER — AMOXICILLIN AND CLAVULANATE POTASSIUM 875; 125 MG/1; MG/1
1 TABLET, FILM COATED ORAL EVERY 12 HOURS SCHEDULED
Qty: 14 TABLET | Refills: 0 | Status: SHIPPED | OUTPATIENT
Start: 2022-08-18 | End: 2022-08-25

## 2022-08-18 NOTE — PROGRESS NOTES
[facility]        NAME: Javad Mccollum is a 61 y o  female  : 1955    MRN: [id number]  DATE: 2022  TIME: 1:45 PM    Assessment and Plan   Acute sinusitis, recurrence not specified, unspecified location [J01 90]  1  Acute sinusitis, recurrence not specified, unspecified location  amoxicillin-clavulanate (AUGMENTIN) 875-125 mg per tablet   2  Allergic rhinitis, unspecified seasonality, unspecified trigger           Patient Instructions     Discussed etiology is likely viral  If no improvement in 3-5 days may start abx  Take antibiotic as directed with food  Recommend probiotics for side effects  Continue with over-the-counter symptom relief - steroid nasal spray, antihistamine, etc   Monitor for worsening symptoms   Follow up with PCP in 3-5 days  Proceed to  ER if symptoms worsen  Chief Complaint     Chief Complaint   Patient presents with    Cold Like Symptoms     Pt reports cold like symptoms with post nasal drip for one month  States feeling fatigue for two days  Negative at home Covid test yesterday  Denies any fever  History of Present Illness       Patient w/ PMH significant for allergic rhinitis presents with complaint of PND x several days  She states that she has been experiencing persistent congestion and PND for at least a month in spite of taking allergy medication and the symptoms acutely worsened in the past few days  She denies fever, chills, sweats, chest tightness, dyspnea, abdominal pain, nausea, vomiting, diarrhea, and throat pain  She states that she just feels fatigued like she is getting sick  She denies recent sick contacts and travel  She reports restarting loratadine and flonase a few days ago  She reports testing negative for COVID19 at home  Review of Systems   Review of Systems   Constitutional: Negative for chills, diaphoresis and fever  HENT: Positive for congestion and postnasal drip   Negative for ear pain, sinus pressure, sinus pain and sore throat  Eyes: Negative for pain and visual disturbance  Respiratory: Negative for cough, chest tightness and shortness of breath  Cardiovascular: Negative for chest pain and palpitations  Gastrointestinal: Negative for abdominal pain and vomiting  Genitourinary: Negative for dysuria and hematuria  Musculoskeletal: Negative for arthralgias and back pain  Skin: Negative for color change and rash  Neurological: Negative for seizures and syncope  All other systems reviewed and are negative          Current Medications       Current Outpatient Medications:     amoxicillin-clavulanate (AUGMENTIN) 875-125 mg per tablet, Take 1 tablet by mouth every 12 (twelve) hours for 7 days, Disp: 14 tablet, Rfl: 0    co-enzyme Q-10 100 mg capsule, Take 100 mg by mouth, Disp: , Rfl:     fluticasone (FLONASE) 50 mcg/act nasal spray, SPRAY 2 SPRAYS INTO EACH NOSTRIL EVERY DAY, Disp: 48 mL, Rfl: 1    guselkumab (TREMFYA) subcutaneous injection, Inject 100 mg under the skin once, Disp: , Rfl:     Tiadylt  MG 24 hr capsule, Take 120 mg by mouth daily, Disp: , Rfl:     traZODone (DESYREL) 50 mg tablet, Take 0 5 tablets (25 mg total) by mouth daily at bedtime as needed for sleep, Disp: 90 tablet, Rfl: 1    Turmeric 400 MG CAPS, Take by mouth, Disp: , Rfl:     amoxicillin (AMOXIL) 500 mg capsule, Prior to dental appts  (Patient not taking: Reported on 8/18/2022), Disp: , Rfl:     Calcium Carbonate 500 MG CHEW, Chew (Patient not taking: Reported on 8/18/2022), Disp: , Rfl:     Cholecalciferol 50 MCG (2000 UT) CAPS, Take 2,000 Units by mouth, Disp: , Rfl:     dicyclomine (BENTYL) 10 mg capsule, , Disp: , Rfl:     Magnesium Oxide 400 MG CAPS, Take by mouth (Patient not taking: Reported on 8/18/2022), Disp: , Rfl:     Melatonin 5 MG CAPS, Take 1 capsule (5 mg total) by mouth daily at bedtime as needed (sleep) (Patient not taking: Reported on 8/18/2022), Disp: , Rfl: 0    phenazopyridine (PYRIDIUM) 100 mg tablet, Take 1 tablet (100 mg total) by mouth 3 (three) times a day as needed for bladder spasms, Disp: 10 tablet, Rfl: 0    Potassium 99 MG TABS, Take by mouth (Patient not taking: Reported on 8/18/2022), Disp: , Rfl:     psyllium (METAMUCIL) 58 6 % packet, Take 1 packet by mouth daily, Disp: , Rfl:     Current Allergies     Allergies as of 08/18/2022 - Reviewed 08/18/2022   Allergen Reaction Noted    Albumen, egg - food allergy Other (See Comments) 07/07/2021    Casein - food allergy Other (See Comments) 07/07/2021    Gluten meal - food allergy Other (See Comments) 07/07/2021    Milk-related compounds - food allergy Other (See Comments) 07/07/2021    Other Other (See Comments) 07/07/2021    Sulfa antibiotics Hives 03/10/2022            The following portions of the patient's history were reviewed and updated as appropriate: allergies, current medications, past family history, past medical history, past social history, past surgical history and problem list      Past Medical History:   Diagnosis Date    Heart valve disease     Mitro valve prolapse    Hypertension     Psoriasis     Psoriatic arthritis ([address] )     Varicella        Past Surgical History:   Procedure Laterality Date    JOINT REPLACEMENT Left 2016    LEFT OOPHORECTOMY Left     benign cyst       Family History   Problem Relation Age of Onset    Colon cancer Maternal Grandmother     Supraventricular tachycardia Mother     Psoriasis Mother     Psoriatic Arthritis Mother     Hypertension Mother     Dementia Mother     Squamous cell carcinoma Mother         skin    Hypertension Father          Medications have been verified  Objective   /98   Pulse 81   Temp 98 5 °F (36 9 °C)   Resp 16   Ht 5' 6" (1 676 m)   Wt 70 3 kg (155 lb)   SpO2 98%   BMI 25 02 kg/m²   No LMP recorded  Patient is postmenopausal        Physical Exam     Physical Exam  Vitals and nursing note reviewed     Constitutional:       General: She is not in acute distress  Appearance: Normal appearance  She is well-developed  She is not ill-appearing or diaphoretic  HENT:      Head: Normocephalic and atraumatic  Right Ear: Tympanic membrane, ear canal and external ear normal       Left Ear: Tympanic membrane, ear canal and external ear normal       Nose: Nose normal  No congestion or rhinorrhea  Mouth/Throat:      Mouth: Mucous membranes are moist       Pharynx: Oropharynx is clear  No oropharyngeal exudate or posterior oropharyngeal erythema  Comments: +PND  Eyes:      General:         Right eye: No discharge  Left eye: No discharge  Conjunctiva/sclera: Conjunctivae normal       Pupils: Pupils are equal, round, and reactive to light  Cardiovascular:      Rate and Rhythm: Normal rate and regular rhythm  Heart sounds: Normal heart sounds  Pulmonary:      Effort: Pulmonary effort is normal  No respiratory distress  Breath sounds: Normal breath sounds  No stridor  No wheezing, rhonchi or rales  Musculoskeletal:      Cervical back: Normal range of motion and neck supple  No rigidity or tenderness  Lymphadenopathy:      Cervical: No cervical adenopathy  Skin:     General: Skin is warm and dry  Capillary Refill: Capillary refill takes less than 2 seconds  Findings: No rash  Neurological:      Mental Status: She is alert and oriented to person, place, and time  Cranial Nerves: No cranial nerve deficit  Sensory: No sensory deficit  Psychiatric:         Behavior: Behavior normal          Thought Content:  Thought content normal

## 2022-08-29 ENCOUNTER — NURSE TRIAGE (OUTPATIENT)
Dept: OTHER | Facility: OTHER | Age: 67
End: 2022-08-29

## 2022-08-29 NOTE — TELEPHONE ENCOUNTER
Reason for Disposition   COVID-19 vaccine, Frequently Asked Questions (FAQs)    Answer Assessment - Initial Assessment Questions  1  MAIN CONCERN OR SYMPTOM:  "What is your main concern right now?" "What question do you have?" "What's the main symptom you're worried about?" (e g , fever, pain, redness, swelling)      I am scheduled for my booster tonight but I have an appointment on Thursday with the ENT,  and want to know if I will still have post vaccine symptoms on Thursday or if they will make me reschedule my appointment      Protocols used: CORONAVIRUS (COVID-19) VACCINE QUESTIONS AND REACTIONS-ADULT-

## 2022-08-29 NOTE — TELEPHONE ENCOUNTER
It is possible to have flu like symptoms for a few days after the vaccine  I do not think she needs to cancel her appointment with ENT for this  If she develops flu like symptoms, she can explain to them that it is from the vaccine

## 2022-08-29 NOTE — TELEPHONE ENCOUNTER
Regarding: Covid vaccine/booster questions  ----- Message from Lennox Uziel sent at 8/29/2022  3:48 PM EDT -----  "I had a Oleary Mi and Oleary Mi covid vaccine and then received their booster 11/2021    I would like to go to [facility] for another booster and I have questions "

## 2022-10-07 ENCOUNTER — OFFICE VISIT (OUTPATIENT)
Dept: CARDIOLOGY CLINIC | Facility: CLINIC | Age: 67
End: 2022-10-07
Payer: COMMERCIAL

## 2022-10-07 VITALS
HEIGHT: 66 IN | BODY MASS INDEX: 25.68 KG/M2 | WEIGHT: 159.8 LBS | DIASTOLIC BLOOD PRESSURE: 100 MMHG | SYSTOLIC BLOOD PRESSURE: 128 MMHG | HEART RATE: 72 BPM

## 2022-10-07 DIAGNOSIS — E78.00 PURE HYPERCHOLESTEROLEMIA: ICD-10-CM

## 2022-10-07 DIAGNOSIS — Z82.49 FAMILY HISTORY OF HEART ATTACK: ICD-10-CM

## 2022-10-07 DIAGNOSIS — I10 HYPERTENSION, ESSENTIAL, BENIGN: Primary | ICD-10-CM

## 2022-10-07 PROCEDURE — 99204 OFFICE O/P NEW MOD 45 MIN: CPT | Performed by: INTERNAL MEDICINE

## 2022-10-07 PROCEDURE — 93000 ELECTROCARDIOGRAM COMPLETE: CPT | Performed by: INTERNAL MEDICINE

## 2022-10-07 RX ORDER — LANOLIN ALCOHOL/MO/W.PET/CERES
CREAM (GRAM) TOPICAL
COMMUNITY

## 2022-10-07 RX ORDER — ACETAMINOPHEN 160 MG
TABLET,DISINTEGRATING ORAL
COMMUNITY

## 2022-10-07 RX ORDER — VITAMIN E 268 MG
CAPSULE ORAL
COMMUNITY

## 2022-10-07 RX ORDER — LUTEIN 10 MG
TABLET ORAL
COMMUNITY

## 2022-10-07 RX ORDER — DEXTROMETHORPHAN POLISTIREX 30 MG/5 ML
SUSPENSION, EXTENDED RELEASE 12 HR ORAL
COMMUNITY

## 2022-10-07 RX ORDER — PEDI MULTIVIT NO.12 W-FLUORIDE 0.25 MG
TABLET,CHEWABLE ORAL
COMMUNITY

## 2022-10-07 RX ORDER — ZEAXANTHIN 90 %
POWDER (GRAM) MISCELLANEOUS
COMMUNITY

## 2022-10-07 NOTE — PROGRESS NOTES
[facility]  Office Consultation  Jojo Hawley 61 y o  female MRN: [id number]        Problems    1  Hypertension, essential, benign  POCT ECG    CT coronary calcium score    VAS screening   2  Family history of heart attack  CT coronary calcium score    VAS screening   3  Pure hypercholesterolemia         Impression:       hypertension   uncontrolled, she suspects this might be office based   Consumes a low-sodium diet   Enjoys exercise   Not a significant amount of alcohol intake  Currently on diltiazem per prior PCP  Lipids   very high HDL of 104   lifetime nonsmoker   currently low ASCVD risk  Family history of stroke/CAD    Plan     home blood pressure checks for at least 2 weeks, morning and afternoon blood pressures, seated in a chair, feet on the ground, arm on the table, lytes down low, and resting for 5 minutes  Calcium score CT   Vascular triple screen   8 week follow-up      Reason for Consult / Principal Problem: Preventive care    HPI: Jojo Hawley is a 61y o  year old female  With hypertension, family history of SVT and stroke in her mother, CAD and COPD in her father, both long-time smokers, who herself has never smoked, drinks alcohol mild to moderately, but has been cutting down for a number of years, normally less than a drink every other day, with hypertension that is currently treated with diltiazem and has been for many years, with office based blood pressures which appear quite uncontrolled, averaging 140/95  She does not currently do home blood pressure checks to prove office based hypertension  She does suspect she has office based hypertension  She has a very high HDL of 104, LDL is lower than that, she has no personal history of atherosclerotic cardiovascular disease  She enjoys exercise, riding her bike, eats healthy, eats a low-sodium diet  She does have psoriatic arthritis    He reports a history of mitral valve prolapse from her young pregnant CD years, but this seems highly unlikely        Review of Systems   Constitutional: Negative for appetite change, diaphoresis, fatigue and fever  Respiratory: Negative for chest tightness, shortness of breath and wheezing  Cardiovascular: Negative for chest pain, palpitations and leg swelling  Gastrointestinal: Negative for abdominal pain and blood in stool  Musculoskeletal: Negative for arthralgias and joint swelling  Skin: Negative for rash  Neurological: Positive for dizziness ( rare episodes)  Negative for syncope and light-headedness  All other systems reviewed and are negative  Past Medical History:   Diagnosis Date    Heart valve disease     Mitro valve prolapse    Hypertension     Psoriasis     Psoriatic arthritis ([address] )     Varicella      Past Surgical History:   Procedure Laterality Date    JOINT REPLACEMENT Left 2016    LEFT OOPHORECTOMY Left     benign cyst     Social History     Substance and Sexual Activity   Alcohol Use Yes    Alcohol/week: 1 0 standard drink    Types: 1 Glasses of wine per week    Comment: With dinner     Social History     Substance and Sexual Activity   Drug Use Not Currently    Comment: Many years ago     Social History     Tobacco Use   Smoking Status Never Smoker   Smokeless Tobacco Never Used     Family History   Problem Relation Age of Onset    Colon cancer Maternal Grandmother     Supraventricular tachycardia Mother     Psoriasis Mother     Psoriatic Arthritis Mother     Hypertension Mother     Dementia Mother     Squamous cell carcinoma Mother         skin    Hypertension Father        Allergies:   Allergies   Allergen Reactions    Albumen, Egg - Food Allergy Other (See Comments)    Casein - Food Allergy Other (See Comments)    Gluten Meal - Food Allergy Other (See Comments)    Milk-Related Compounds - Food Allergy Other (See Comments)    Sulfa Antibiotics Hives       Medications:     Current Outpatient Medications:     Calcium-Vitamin D-Vitamin K (Calcium + D) 500-1000-40 MG-UNT-MCG CHEW, , Disp: , Rfl:     Cholecalciferol (Vitamin D3) 50 MCG (2000 UT) capsule, , Disp: , Rfl:     guselkumab (TREMFYA) subcutaneous injection, Inject 100 mg under the skin once, Disp: , Rfl:     Lutein 10 MG TABS, , Disp: , Rfl:     Omega-3 Fatty Acids (Omega-3 2100) 1050 MG CAPS, , Disp: , Rfl:     psyllium (METAMUCIL) 58 6 % packet, Take 1 packet by mouth daily, Disp: , Rfl:     Tiadylt  MG 24 hr capsule, Take 120 mg by mouth daily, Disp: , Rfl:     traZODone (DESYREL) 50 mg tablet, Take 0 5 tablets (25 mg total) by mouth daily at bedtime as needed for sleep, Disp: 90 tablet, Rfl: 1    Turmeric 400 MG CAPS, Take by mouth, Disp: , Rfl:     vitamin B-12 (VITAMIN B-12) 1,000 mcg tablet, , Disp: , Rfl:     Vitamin E 180 MG (400 UNIT) capsule, , Disp: , Rfl:     Zeaxanthin POWD, , Disp: , Rfl:     amoxicillin (AMOXIL) 500 mg capsule, Prior to dental appts  (Patient not taking: No sig reported), Disp: , Rfl:     fluticasone (FLONASE) 50 mcg/act nasal spray, SPRAY 2 SPRAYS INTO EACH NOSTRIL EVERY DAY (Patient not taking: No sig reported), Disp: 48 mL, Rfl: 1    omeprazole (PriLOSEC) 20 mg delayed release capsule, Take 1 capsule (20 mg total) by mouth every morning, Disp: 90 capsule, Rfl: 6      Vitals:    10/07/22 1024   BP: 128/100   Pulse: 72     Weight (last 2 days)     Date/Time Weight    10/07/22 1024 72 5 (159 8)        Physical Exam  Constitutional:       General: She is not in acute distress  Appearance: She is well-developed  She is not diaphoretic  HENT:      Head: Normocephalic and atraumatic  Eyes:      General: No scleral icterus  Conjunctiva/sclera: Conjunctivae normal       Pupils: Pupils are equal, round, and reactive to light  Neck:      Thyroid: No thyromegaly  Vascular: No JVD  Trachea: No tracheal deviation  Cardiovascular:      Rate and Rhythm: Normal rate and regular rhythm  Heart sounds: Normal heart sounds   No murmur heard  No friction rub  No gallop  Pulmonary:      Effort: Pulmonary effort is normal  No respiratory distress  Breath sounds: Normal breath sounds  No wheezing or rales  Abdominal:      General: Bowel sounds are normal  There is no distension  Palpations: Abdomen is soft  Tenderness: There is no abdominal tenderness  Musculoskeletal:         General: No tenderness or deformity  Normal range of motion  Cervical back: Normal range of motion and neck supple  Right lower leg: No edema  Left lower leg: No edema  Skin:     General: Skin is warm and dry  Findings: No erythema or rash  Neurological:      Mental Status: She is alert and oriented to person, place, and time  Cranial Nerves: No cranial nerve deficit  Psychiatric:         Judgment: Judgment normal            Laboratory Studies:    Laboratory studies personally reviewed    Cardiac testing:     EKG reviewed personally:   Results for orders placed or performed in visit on 10/07/22   POCT ECG    Impression     Normal sinus rhythm, normal EKG       Echocardiogram:      Stress test:      Holter:      Cardiac catheterization:        oHda Dahl MD    Portions of the record may have been created with voice recognition software  Occasional wrong word or "sound a like" substitutions may have occurred due to the inherent limitations of voice recognition software  Read the chart carefully and recognize, using context, where substitutions have occurred

## 2022-10-11 ENCOUNTER — HOSPITAL ENCOUNTER (OUTPATIENT)
Dept: RADIOLOGY | Facility: HOSPITAL | Age: 67
Discharge: HOME | End: 2022-10-11
Attending: OTOLARYNGOLOGY
Payer: COMMERCIAL

## 2022-10-11 DIAGNOSIS — R09.82 PND (POST-NASAL DRIP): ICD-10-CM

## 2022-10-11 PROCEDURE — 74220 X-RAY XM ESOPHAGUS 1CNTRST: CPT

## 2022-10-19 ENCOUNTER — HOSPITAL ENCOUNTER (OUTPATIENT)
Dept: RADIOLOGY | Facility: HOSPITAL | Age: 67
Discharge: HOME | End: 2022-10-19
Attending: INTERNAL MEDICINE
Payer: COMMERCIAL

## 2022-10-19 DIAGNOSIS — Z82.49 FAMILY HISTORY OF HEART ATTACK: ICD-10-CM

## 2022-10-19 DIAGNOSIS — I10 HYPERTENSION, ESSENTIAL, BENIGN: ICD-10-CM

## 2022-10-19 PROCEDURE — G1004 CDSM NDSC: HCPCS

## 2022-10-19 PROCEDURE — 75571 CT HRT W/O DYE W/CA TEST: CPT

## 2022-10-26 ENCOUNTER — HOSPITAL ENCOUNTER (OUTPATIENT)
Dept: NON INVASIVE DIAGNOSTICS | Facility: CLINIC | Age: 67
Discharge: HOME | End: 2022-10-26
Payer: COMMERCIAL

## 2022-10-26 DIAGNOSIS — Z82.49 FAMILY HISTORY OF HEART ATTACK: ICD-10-CM

## 2022-10-26 DIAGNOSIS — I10 HYPERTENSION, ESSENTIAL, BENIGN: ICD-10-CM

## 2022-10-26 PROCEDURE — 93922 UPR/L XTREMITY ART 2 LEVELS: CPT

## 2022-10-27 ENCOUNTER — HOSPITAL ENCOUNTER (OUTPATIENT)
Dept: MAMMOGRAPHY | Facility: IMAGING CENTER | Age: 67
Discharge: HOME | End: 2022-10-27
Payer: COMMERCIAL

## 2022-10-27 VITALS — WEIGHT: 159 LBS | HEIGHT: 66 IN | BODY MASS INDEX: 25.55 KG/M2

## 2022-10-27 DIAGNOSIS — Z12.31 ENCOUNTER FOR SCREENING MAMMOGRAM FOR MALIGNANT NEOPLASM OF BREAST: ICD-10-CM

## 2022-10-27 PROCEDURE — 77063 BREAST TOMOSYNTHESIS BI: CPT

## 2022-10-27 PROCEDURE — 77067 SCR MAMMO BI INCL CAD: CPT

## 2022-12-07 ENCOUNTER — OFFICE VISIT (OUTPATIENT)
Dept: CARDIOLOGY CLINIC | Facility: CLINIC | Age: 67
End: 2022-12-07

## 2022-12-07 VITALS
WEIGHT: 158 LBS | BODY MASS INDEX: 25.39 KG/M2 | HEART RATE: 77 BPM | HEIGHT: 66 IN | SYSTOLIC BLOOD PRESSURE: 130 MMHG | OXYGEN SATURATION: 96 % | DIASTOLIC BLOOD PRESSURE: 92 MMHG

## 2022-12-07 DIAGNOSIS — I34.1 MITRAL VALVE PROLAPSE: Primary | ICD-10-CM

## 2022-12-07 DIAGNOSIS — E78.00 PURE HYPERCHOLESTEROLEMIA: ICD-10-CM

## 2022-12-07 DIAGNOSIS — Z82.49 FAMILY HISTORY OF HEART ATTACK: ICD-10-CM

## 2022-12-07 DIAGNOSIS — I10 HYPERTENSION, ESSENTIAL, BENIGN: ICD-10-CM

## 2022-12-07 NOTE — PROGRESS NOTES
[facility]  Office Consultation  Riya Salvador 61 y o  female MRN: [id number]        Problems    1  Mitral valve prolapse        2  Pure hypercholesterolemia        3  Hypertension, essential, benign        4  Family history of heart attack            Impression:       hypertension  Office-based uncontrolled hypertension  She consumes a low-sodium diet  E she enjoys exercise  He does not have a significant amount of alcohol intake  Currently on she continues on diltiazem  She thinks her home blood pressures are quite normal, but she could not find her recordings, and will reassess again  Lipids   very high HDL of 104   lifetime nonsmoker  Her ASCVD risk remains low despite the elevated calcium score of 186  Her vascular triple screen was normal  Family history of stroke/CAD  Non premature    Plan    Reassess home blood pressures again, as long as less than 135/85, no reason to call back  Would not pursue any statin treatment at this time, with a low ASCVD score even though she has a intermediate elevated calcium score, her family history of CAD is not premature  She has a pretty healthy lifestyle, she avoids dairy, she eats fish and chicken  Annual follow-up for preventive care      HPI: Riya Salvador is a 61y o  year old female  With hypertension, family history of SVT and stroke in her mother, CAD and COPD in her father (not premature), both long-time smokers, who herself has never smoked, with mild alcohol intake, who came to see me for prevention  Office-based blood pressures are high, she takes diltiazem, she is pretty sure her home blood pressures were normal when she checked them previously, but she lost the paperwork and did not bring anything into the office for evaluation  She is willing to reassess  Lipids show high HDL of 104, her family history is not premature from a CAD standpoint, she underwent a calcium score of 186, but is otherwise physically active and asymptomatic    She has no new complaints for me today  She is plagued by arthritis, she avoids dairy for this reason  Review of Systems   Constitutional: Negative for appetite change, diaphoresis, fatigue and fever  Respiratory: Negative for chest tightness, shortness of breath and wheezing  Cardiovascular: Negative for chest pain, palpitations and leg swelling  Gastrointestinal: Negative for abdominal pain and blood in stool  Musculoskeletal: Positive for arthralgias  Negative for joint swelling  Skin: Negative for rash  Neurological: Negative for dizziness, syncope and light-headedness  Past Medical History:   Diagnosis Date   • Heart valve disease     Mitro valve prolapse   • Hypertension    • Psoriasis    • Psoriatic arthritis ([facility] [address] )    • Varicella      Past Surgical History:   Procedure Laterality Date   • JOINT REPLACEMENT Left 2016   • LEFT OOPHORECTOMY Left 2007    benign cyst   • OOPHORECTOMY       Social History     Substance and Sexual Activity   Alcohol Use Yes   • Alcohol/week: 1 0 standard drink   • Types: 1 Glasses of wine per week    Comment: With dinner     Social History     Substance and Sexual Activity   Drug Use Not Currently    Comment: Many years ago     Social History     Tobacco Use   Smoking Status Never   Smokeless Tobacco Never     Family History   Problem Relation Age of Onset   • Supraventricular tachycardia Mother    • Psoriasis Mother    • Psoriatic Arthritis Mother    • Hypertension Mother    • Dementia Mother    • Squamous cell carcinoma Mother         skin- unknown age   • Hypertension Father    • Kidney cancer Sister         19's   • No Known Problems Sister    • No Known Problems Daughter    • Colon cancer Maternal Grandmother 70   • No Known Problems Maternal Grandfather    • No Known Problems Paternal Grandmother    • Prostate cancer Paternal Grandfather         80's   • No Known Problems Maternal Aunt    • No Known Problems Maternal Aunt        Allergies:   Allergies Allergen Reactions   • Albumen, Egg - Food Allergy Other (See Comments)   • Casein - Food Allergy Other (See Comments)   • Gluten Meal - Food Allergy Other (See Comments)   • Milk-Related Compounds - Food Allergy Other (See Comments)   • Sulfa Antibiotics Hives       Medications:     Current Outpatient Medications:   •  amoxicillin (AMOXIL) 500 mg capsule, Prior to dental appts, Disp: , Rfl:   •  Calcium-Vitamin D-Vitamin K (Calcium + D) 500-1000-40 MG-UNT-MCG CHEW, , Disp: , Rfl:   •  Cholecalciferol (Vitamin D3) 50 MCG (2000 UT) capsule, , Disp: , Rfl:   •  guselkumab (TREMFYA) subcutaneous injection, Inject 100 mg under the skin every 2 (two) months, Disp: , Rfl:   •  Omega-3 Fatty Acids (Omega-3 2100) 1050 MG CAPS, , Disp: , Rfl:   •  omeprazole (PriLOSEC) 20 mg delayed release capsule, Take 1 capsule (20 mg total) by mouth every morning, Disp: 90 capsule, Rfl: 6  •  psyllium (METAMUCIL) 58 6 % packet, Take 1 packet by mouth daily, Disp: , Rfl:   •  Tiadylt  MG 24 hr capsule, Take 120 mg by mouth daily, Disp: , Rfl:   •  traZODone (DESYREL) 50 mg tablet, Take 0 5 tablets (25 mg total) by mouth daily at bedtime as needed for sleep, Disp: 90 tablet, Rfl: 1  •  Turmeric 400 MG CAPS, Take by mouth, Disp: , Rfl:   •  vitamin B-12 (VITAMIN B-12) 1,000 mcg tablet, , Disp: , Rfl:   •  Vitamin E 180 MG (400 UNIT) capsule, , Disp: , Rfl:   •  fluticasone (FLONASE) 50 mcg/act nasal spray, SPRAY 2 SPRAYS INTO EACH NOSTRIL EVERY DAY (Patient not taking: Reported on 10/7/2022), Disp: 48 mL, Rfl: 1  •  Lutein 10 MG TABS, , Disp: , Rfl:   •  Zeaxanthin POWD, , Disp: , Rfl:       Vitals:    12/07/22 1044   BP: 130/92   Pulse: 77   SpO2: 96%     Weight (last 2 days)     Date/Time Weight    12/07/22 1044 71 7 (158)        Physical Exam  Constitutional:       General: She is not in acute distress  Appearance: She is not diaphoretic  HENT:      Head: Normocephalic and atraumatic     Eyes:      General: No scleral icterus  Conjunctiva/sclera: Conjunctivae normal    Neck:      Vascular: No JVD  Cardiovascular:      Rate and Rhythm: Normal rate and regular rhythm  Heart sounds: Normal heart sounds  No murmur heard  Pulmonary:      Effort: Pulmonary effort is normal  No respiratory distress  Breath sounds: Normal breath sounds  No wheezing, rhonchi or rales  Musculoskeletal:         General: No tenderness  Cervical back: Normal range of motion  Right lower leg: Normal  No edema  Left lower leg: Normal  No edema  Skin:     General: Skin is warm and dry  Laboratory Studies:    Laboratory studies personally reviewed    Cardiac testing:     EKG reviewed personally:   No results found for this visit on 12/07/22  Echocardiogram:      Stress test:      Holter:       Calcium score CT  2022-186    Vascular  2022-vascular triple screen-normal      Cardiac catheterization:        Wili Rosario MD    Portions of the record may have been created with voice recognition software  Occasional wrong word or "sound a like" substitutions may have occurred due to the inherent limitations of voice recognition software  Read the chart carefully and recognize, using context, where substitutions have occurred

## 2022-12-07 NOTE — PATIENT INSTRUCTIONS
Your calcium score on your CAT scan was 183, this is an intermediate range result, combined with your family history which does not sound like truly premature heart disease, and combined with your high good cholesterol of 104, I do not think there is a strong need to treat your cholesterol with statin therapy  Try to focus on just healthy lifestyle, active lifestyle, and significant reduction in saturated fats from meat and dairy  From a blood pressure standpoint, as long as your blood pressure at home is less than 135/85, I would be happy

## 2022-12-30 ENCOUNTER — RA CDI HCC (OUTPATIENT)
Dept: OTHER | Facility: HOSPITAL | Age: 67
End: 2022-12-30

## 2022-12-30 NOTE — PROGRESS NOTES
[facility] [address]  coding opportunities       Chart reviewed, no opportunity found: CHART REVIEWED, NO OPPORTUNITY FOUND        Patients Insurance     Medicare Insurance: Medicare

## 2023-01-05 RX ORDER — CHLORAL HYDRATE 500 MG
1000 CAPSULE ORAL DAILY
COMMUNITY
Start: 2023-01-01

## 2023-01-09 ENCOUNTER — OFFICE VISIT (OUTPATIENT)
Dept: INTERNAL MEDICINE CLINIC | Facility: CLINIC | Age: 68
End: 2023-01-09

## 2023-01-09 VITALS
WEIGHT: 158.6 LBS | HEIGHT: 66 IN | RESPIRATION RATE: 14 BRPM | BODY MASS INDEX: 25.49 KG/M2 | TEMPERATURE: 99.1 F | DIASTOLIC BLOOD PRESSURE: 70 MMHG | SYSTOLIC BLOOD PRESSURE: 130 MMHG

## 2023-01-09 DIAGNOSIS — Z13.820 SCREENING FOR OSTEOPOROSIS: ICD-10-CM

## 2023-01-09 DIAGNOSIS — E78.00 PURE HYPERCHOLESTEROLEMIA: ICD-10-CM

## 2023-01-09 DIAGNOSIS — E53.8 B12 DEFICIENCY: ICD-10-CM

## 2023-01-09 DIAGNOSIS — L40.50 PSORIATIC ARTHRITIS (HCC): ICD-10-CM

## 2023-01-09 DIAGNOSIS — E55.9 VITAMIN D DEFICIENCY: ICD-10-CM

## 2023-01-09 DIAGNOSIS — N95.9 UNSPECIFIED MENOPAUSAL AND PERIMENOPAUSAL DISORDER: ICD-10-CM

## 2023-01-09 DIAGNOSIS — I10 HYPERTENSION, ESSENTIAL, BENIGN: ICD-10-CM

## 2023-01-09 DIAGNOSIS — Z00.00 WELCOME TO MEDICARE PREVENTIVE VISIT: Primary | ICD-10-CM

## 2023-01-09 DIAGNOSIS — Z23 ENCOUNTER FOR IMMUNIZATION: ICD-10-CM

## 2023-01-09 NOTE — PROGRESS NOTES
Assessment and Plan:   Discussed supplements/vitamind  Continue D and Ca, fish oil and turmeric  No indication for B12 and E  Suspect OA in the hands but since there is no pain, will observe for now  Problem List Items Addressed This Visit        Cardiovascular and Mediastinum    Hypertension, essential, benign    Relevant Orders    CBC and differential    Comprehensive metabolic panel       Musculoskeletal and Integument    Psoriatic arthritis (HCC)    Relevant Orders    C-reactive protein    Sedimentation rate, automated       Other    Pure hypercholesterolemia     She saw cardiology CAC CT score 183, normal vascular screening doppler  She is not on a statin at this time  Continue to monitor         Relevant Orders    Lipid Panel with Direct LDL reflex   Other Visit Diagnoses     Welcome to Medicare preventive visit    -  Primary    Encounter for immunization        Relevant Orders    Pneumococcal Conjugate Vaccine 20-valent (PCV20) (Completed)    Screening for osteoporosis        Relevant Orders    DXA bone density spine hip and pelvis    Vitamin D deficiency        Relevant Orders    Vitamin D 25 hydroxy    B12 deficiency        Relevant Orders    Vitamin B12    Unspecified menopausal and perimenopausal disorder        Relevant Orders    DXA bone density spine hip and pelvis        BMI Counseling: Body mass index is 25 99 kg/m²  The BMI is above normal  Nutrition recommendations include encouraging healthy choices of fruits and vegetables, moderation in carbohydrate intake and reducing intake of saturated and trans fat  Exercise recommendations include exercising 3-5 times per week  Rationale for BMI follow-up plan is due to patient being overweight or obese  Depression Screening and Follow-up Plan: Patient was screened for depression during today's encounter  They screened negative with a PHQ-2 score of 0        Preventive health issues were discussed with patient, and age appropriate screening tests were ordered as noted in patient's After Visit Summary  Personalized health advice and appropriate referrals for health education or preventive services given if needed, as noted in patient's After Visit Summary  History of Present Illness:     Patient presents for a Medicare Wellness Visit    HPI   Patient Care Team:  Merlinda Abraham, MD as PCP - General (Internal Medicine)     Review of Systems:     Review of Systems   Constitutional: Negative for chills and unexpected weight change  HENT: Positive for postnasal drip and trouble swallowing (globus sensation, improved on omeprazole)  Respiratory: Negative for shortness of breath  Cardiovascular: Negative for chest pain and palpitations  Gastrointestinal: Negative for abdominal pain, blood in stool, constipation, diarrhea, nausea and vomiting  Genitourinary: Negative for difficulty urinating  Musculoskeletal: Positive for arthralgias (improved on Tremfya; deformity of the CMC bilaterally without pain)  Neurological: Negative for dizziness and headaches          Problem List:     Patient Active Problem List   Diagnosis   • Psoriasis   • Psoriatic arthritis ([facility] [address] )   • Hypertension, essential, benign   • Mitral valve prolapse   • Irritable bowel syndrome   • Psychophysiological insomnia   • Allergic rhinitis   • Bilateral bunions   • Dense breast tissue   • Family history of kidney cancer   • Family history of colon cancer   • Family history of COPD (chronic obstructive pulmonary disease)   • Family history of heart attack   • Family history of macular degeneration   • History of partial knee replacement   • Pure hypercholesterolemia      Past Medical and Surgical History:     Past Medical History:   Diagnosis Date   • Heart valve disease     Mitro valve prolapse   • Hypertension    • Psoriasis    • Psoriatic arthritis ([facility] [address] )    • Varicella      Past Surgical History:   Procedure Laterality Date   • JOINT REPLACEMENT Left 2016   • LEFT OOPHORECTOMY Left 2007 benign cyst   • OOPHORECTOMY        Family History:     Family History   Problem Relation Age of Onset   • Stroke Mother    • Supraventricular tachycardia Mother    • Psoriasis Mother    • Psoriatic Arthritis Mother    • Hypertension Mother    • Dementia Mother    • Squamous cell carcinoma Mother         skin- unknown age   • Hypertension Father    • Kidney cancer Sister         19's   • No Known Problems Sister    • No Known Problems Daughter    • Colon cancer Maternal Grandmother 70   • No Known Problems Maternal Grandfather    • No Known Problems Paternal Grandmother    • Prostate cancer Paternal Grandfather         80's   • No Known Problems Maternal Aunt    • No Known Problems Maternal Aunt       Social History:     Social History     Socioeconomic History   • Marital status:      Spouse name: None   • Number of children: None   • Years of education: None   • Highest education level: None   Occupational History   • Occupation: retired   Tobacco Use   • Smoking status: Never   • Smokeless tobacco: Never   Vaping Use   • Vaping Use: Never used   Substance and Sexual Activity   • Alcohol use: Yes     Alcohol/week: 1 0 standard drink     Types: 1 Glasses of wine per week     Comment: With dinner   • Drug use: Not Currently     Comment: Many years ago   • Sexual activity: Not Currently     Partners: Male     Birth control/protection: Post-menopausal   Other Topics Concern   • None   Social History Narrative   • None     Social Determinants of Health     Financial Resource Strain: Low Risk    • Difficulty of Paying Living Expenses: Not hard at all   Food Insecurity: Not on file   Transportation Needs: No Transportation Needs   • Lack of Transportation (Medical): No   • Lack of Transportation (Non-Medical):  No   Physical Activity: Not on file   Stress: Not on file   Social Connections: Not on file   Intimate Partner Violence: Not on file   Housing Stability: Not on file      Medications and Allergies: Current Outpatient Medications   Medication Sig Dispense Refill   • Calcium-Vitamin D-Vitamin K (Calcium + D) 500-1000-40 MG-UNT-MCG CHEW      • Cholecalciferol (Vitamin D3) 50 MCG (2000 UT) capsule      • fluticasone (FLONASE) 50 mcg/act nasal spray SPRAY 2 SPRAYS INTO EACH NOSTRIL EVERY DAY (Patient taking differently: if needed) 48 mL 1   • guselkumab (TREMFYA) subcutaneous injection Inject 100 mg under the skin every 2 (two) months Medication on hold until insurance clearance     • Omega-3 Fatty Acids (fish oil) 1,000 mg      • omeprazole (PriLOSEC) 20 mg delayed release capsule Take 1 capsule (20 mg total) by mouth every morning 90 capsule 6   • psyllium (METAMUCIL) 58 6 % packet Take 1 packet by mouth 2 (two) times a day     • Tiadylt  MG 24 hr capsule Take 120 mg by mouth daily     • traZODone (DESYREL) 50 mg tablet Take 0 5 tablets (25 mg total) by mouth daily at bedtime as needed for sleep 90 tablet 1   • Turmeric 400 MG CAPS Take by mouth     • amoxicillin (AMOXIL) 500 mg capsule Prior to dental appts (Patient not taking: Reported on 1/9/2023)       No current facility-administered medications for this visit       Allergies   Allergen Reactions   • Albumen, Egg - Food Allergy Other (See Comments)   • Casein - Food Allergy Other (See Comments)   • Gluten Meal - Food Allergy Other (See Comments)   • Milk-Related Compounds - Food Allergy Other (See Comments)   • Sulfa Antibiotics Hives      Immunizations:     Immunization History   Administered Date(s) Administered   • COVID-19 J&J ([employer]) vaccine 0 5 mL 03/08/2021, 11/24/2021   • COVID-19 Pfizer vac (Dylan-sucrose, gray cap) 12 yr+ IM 08/29/2022   • INFLUENZA 10/01/2015, 10/01/2018, 10/07/2022   • Influenza LAIV (Nasal) 10/20/2008   • Influenza Quadrivalent 3 years and older 10/23/2021   • Influenza Split 09/20/2011   • Influenza, seasonal, injectable, preservative free 10/17/2016, 09/10/2020   • Pneumococcal Conjugate Vaccine 20-valent (Pcv20), Polysace 01/09/2023   • Pneumococcal Polysaccharide PPV23 04/03/2000, 01/14/2009   • Td (adult), adsorbed 01/01/2002   • Tdap 05/10/2012   • Zoster Vaccine Recombinant 02/27/2020, 06/16/2020      Health Maintenance:         Topic Date Due   • Colorectal Cancer Screening  Never done   • HIV Screening  02/09/2023 (Originally 12/18/1970)   • Hepatitis C Screening  01/09/2024 (Originally 1955)   • Breast Cancer Screening: Mammogram  10/27/2023         Topic Date Due   • Hepatitis B Vaccine (1 of 3 - 3-dose series) Never done   • COVID-19 Vaccine (4 - Booster for Yas series) 10/24/2022      Medicare Screening Tests and Risk Assessments:     Marichuy De Anda is here for her Welcome to Medicare visit  Health Risk Assessment:   Patient rates overall health as good  Patient feels that their physical health rating is slightly worse  Patient is satisfied with their life  Eyesight was rated as slightly worse  Hearing was rated as same  Patient feels that their emotional and mental health rating is same  Patients states they are never, rarely angry  Patient states they are sometimes unusually tired/fatigued  Pain experienced in the last 7 days has been some  Patient's pain rating has been 1/10  Patient states that she has experienced no weight loss or gain in last 6 months  Depression Screening:   PHQ-2 Score: 0      Fall Risk Screening: In the past year, patient has experienced: no history of falling in past year      Urinary Incontinence Screening:   Patient has leaked urine accidently in the last six months  Home Safety:  Patient does not have trouble with stairs inside or outside of their home  Patient has working smoke alarms and has working carbon monoxide detector  Home safety hazards include: none  Nutrition:   Current diet is Low Cholesterol, Low Saturated Fat and Limited junk food  limited eggs, dairy, and gluten    Medications:   Patient is currently taking over-the-counter supplements   OTC medications include: see list of meds  Patient is able to manage medications  Activities of Daily Living (ADLs)/Instrumental Activities of Daily Living (IADLs):   Walk and transfer into and out of bed and chair?: Yes  Dress and groom yourself?: Yes    Bathe or shower yourself?: Yes    Feed yourself? Yes  Do your laundry/housekeeping?: Yes  Manage your money, pay your bills and track your expenses?: Yes  Make your own meals?: Yes    Do your own shopping?: Yes    Previous Hospitalizations:   Any hospitalizations or ED visits within the last 12 months?: No      Advance Care Planning:   Living will: Yes    Durable POA for healthcare: No    Advanced directive: Yes      Cognitive Screening:   Provider or family/friend/caregiver concerned regarding cognition?: No    PREVENTIVE SCREENINGS      Cardiovascular Screening:    General: Screening Not Indicated and History Lipid Disorder      Diabetes Screening:     General: Screening Current      Colorectal Cancer Screening:     General: Screening Current      Breast Cancer Screening:     General: Screening Current      Cervical Cancer Screening:    General: Screening Not Indicated      Osteoporosis Screening:    General: Risks and Benefits Discussed    Due for: DXA Axial      Abdominal Aortic Aneurysm (AAA) Screening:        General: Screening Not Indicated      Lung Cancer Screening:     General: Screening Not Indicated      Hepatitis C Screening:    General: Screening Current    Screening, Brief Intervention, and Referral to Treatment (SBIRT)    Screening  Typical number of drinks in a day: 3  Typical number of drinks in a week: 3  Interpretation: Low risk drinking behavior  AUDIT-C Screenin) How often did you have a drink containing alcohol in the past year? 2 to 3 times a week  2) How many drinks did you have on a typical day when you were drinking in the past year? 1 to 2  3) How often did you have 6 or more drinks on one occasion in the past year? never    AUDIT-C Score: 3  Interpretation: Score 3-12 (female): POSITIVE screen for alcohol misuse    Single Item Drug Screening:  How often have you used an illegal drug (including marijuana) or a prescription medication for non-medical reasons in the past year? never    Single Item Drug Screen Score: 0  Interpretation: Negative screen for possible drug use disorder    Vision Screening    Right eye Left eye Both eyes   Without correction      With correction 20/25 20/20 20/20        Physical Exam:     /70 (BP Location: Left arm, Patient Position: Sitting, Cuff Size: Large)   Temp 99 1 °F (37 3 °C) (Tympanic)   Resp 14   Ht 5' 5 5" (1 664 m)   Wt 71 9 kg (158 lb 9 6 oz)   BMI 25 99 kg/m²     Physical Exam  Constitutional:       General: She is not in acute distress  Appearance: She is well-developed  She is not ill-appearing, toxic-appearing or diaphoretic  HENT:      Head: Normocephalic and atraumatic  Right Ear: External ear normal  There is no impacted cerumen  Left Ear: External ear normal  There is no impacted cerumen  Eyes:      Conjunctiva/sclera: Conjunctivae normal    Cardiovascular:      Rate and Rhythm: Normal rate and regular rhythm  Heart sounds: Normal heart sounds  No murmur heard  Pulmonary:      Effort: Pulmonary effort is normal  No respiratory distress  Breath sounds: Normal breath sounds  No stridor  No wheezing or rales  Abdominal:      General: There is no distension  Palpations: Abdomen is soft  There is no mass  Tenderness: There is no abdominal tenderness  There is no guarding or rebound  Musculoskeletal:         General: Deformity (bilateral 1st CMC ) present  Cervical back: Neck supple  Right lower leg: No edema  Left lower leg: No edema  Neurological:      Mental Status: She is alert and oriented to person, place, and time     Psychiatric:         Mood and Affect: Mood normal          Behavior: Behavior normal  Thought Content:  Thought content normal          Judgment: Judgment normal           Corinne Ferries, MD

## 2023-01-09 NOTE — PATIENT INSTRUCTIONS
Medicare Preventive Visit Patient Instructions  Thank you for completing your Welcome to Medicare Visit or Medicare Annual Wellness Visit today  Your next wellness visit will be due in one year (1/10/2024)  The screening/preventive services that you may require over the next 5-10 years are detailed below  Some tests may not apply to you based off risk factors and/or age  Screening tests ordered at today's visit but not completed yet may show as past due  Also, please note that scanned in results may not display below  Preventive Screenings:  Service Recommendations Previous Testing/Comments   Colorectal Cancer Screening  * Colonoscopy    * Fecal Occult Blood Test (FOBT)/Fecal Immunochemical Test (FIT)  * Fecal DNA/Cologuard Test  * Flexible Sigmoidoscopy Age: 39-70 years old   Colonoscopy: every 10 years (may be performed more frequently if at higher risk)  OR  FOBT/FIT: every 1 year  OR  Cologuard: every 3 years  OR  Sigmoidoscopy: every 5 years  Screening may be recommended earlier than age 39 if at higher risk for colorectal cancer  Also, an individualized decision between you and your healthcare provider will decide whether screening between the ages of 74-80 would be appropriate  Colonoscopy: 08/22/2018  FOBT/FIT: Not on file  Cologuard: Not on file  Sigmoidoscopy: Not on file          Breast Cancer Screening Age: 36 years old  Frequency: every 1-2 years  Not required if history of left and right mastectomy Mammogram: 10/27/2022        Cervical Cancer Screening Between the ages of 21-29, pap smear recommended once every 3 years  Between the ages of 33-67, can perform pap smear with HPV co-testing every 5 years     Recommendations may differ for women with a history of total hysterectomy, cervical cancer, or abnormal pap smears in past  Pap Smear: 04/05/2022        Hepatitis C Screening Once for adults born between 1945 and 1965  More frequently in patients at high risk for Hepatitis C Hep C Antibody: Not on file        Diabetes Screening 1-2 times per year if you're at risk for diabetes or have pre-diabetes Fasting glucose: 101 mg/dL (3/14/2022)  A1C: No results in last 5 years (No results in last 5 years)      Cholesterol Screening Once every 5 years if you don't have a lipid disorder  May order more often based on risk factors  Lipid panel: 03/14/2022          Other Preventive Screenings Covered by Medicare:  1  Abdominal Aortic Aneurysm (AAA) Screening: covered once if your at risk  You're considered to be at risk if you have a family history of AAA  2  Lung Cancer Screening: covers low dose CT scan once per year if you meet all of the following conditions: (1) Age 50-69; (2) No signs or symptoms of lung cancer; (3) Current smoker or have quit smoking within the last 15 years; (4) You have a tobacco smoking history of at least 20 pack years (packs per day multiplied by number of years you smoked); (5) You get a written order from a healthcare provider  3  Glaucoma Screening: covered annually if you're considered high risk: (1) You have diabetes OR (2) Family history of glaucoma OR (3)  aged 48 and older OR (3)  American aged 72 and older  3  Osteoporosis Screening: covered every 2 years if you meet one of the following conditions: (1) You're estrogen deficient and at risk for osteoporosis based off medical history and other findings; (2) Have a vertebral abnormality; (3) On glucocorticoid therapy for more than 3 months; (4) Have primary hyperparathyroidism; (5) On osteoporosis medications and need to assess response to drug therapy  · Last bone density test (DXA Scan): Not on file  5  HIV Screening: covered annually if you're between the age of 12-76  Also covered annually if you are younger than 13 and older than 72 with risk factors for HIV infection  For pregnant patients, it is covered up to 3 times per pregnancy      Immunizations:  Immunization Recommendations   Influenza Vaccine Annual influenza vaccination during flu season is recommended for all persons aged >= 6 months who do not have contraindications   Pneumococcal Vaccine   * Pneumococcal conjugate vaccine = PCV13 (Prevnar 13), PCV15 (Vaxneuvance), PCV20 (Prevnar 20)  * Pneumococcal polysaccharide vaccine = PPSV23 (Pneumovax) Adults 25-60 years old: 1-3 doses may be recommended based on certain risk factors  Adults 72 years old: 1-2 doses may be recommended based off what pneumonia vaccine you previously received   Hepatitis B Vaccine 3 dose series if at intermediate or high risk (ex: diabetes, end stage renal disease, liver disease)   Tetanus (Td) Vaccine - COST NOT COVERED BY MEDICARE PART B Following completion of primary series, a booster dose should be given every 10 years to maintain immunity against tetanus  Td may also be given as tetanus wound prophylaxis  Tdap Vaccine - COST NOT COVERED BY MEDICARE PART B Recommended at least once for all adults  For pregnant patients, recommended with each pregnancy  Shingles Vaccine (Shingrix) - COST NOT COVERED BY MEDICARE PART B  2 shot series recommended in those aged 48 and above     Health Maintenance Due:      Topic Date Due   • Colorectal Cancer Screening  Never done   • HIV Screening  02/09/2023 (Originally 12/18/1970)   • Hepatitis C Screening  01/09/2024 (Originally 1955)   • Breast Cancer Screening: Mammogram  10/27/2023     Immunizations Due:      Topic Date Due   • Hepatitis B Vaccine (1 of 3 - 3-dose series) Never done   • Pneumococcal Vaccine: 65+ Years (2 - PCV) 01/14/2010   • COVID-19 Vaccine (4 - Booster for Yas series) 10/24/2022     Advance Directives   What are advance directives? Advance directives are legal documents that state your wishes and plans for medical care  These plans are made ahead of time in case you lose your ability to make decisions for yourself   Advance directives can apply to any medical decision, such as the treatments you want, and if you want to donate organs  What are the types of advance directives? There are many types of advance directives, and each state has rules about how to use them  You may choose a combination of any of the following:  · Living will: This is a written record of the treatment you want  You can also choose which treatments you do not want, which to limit, and which to stop at a certain time  This includes surgery, medicine, IV fluid, and tube feedings  · Durable power of  for healthcare [facility] [place]): This is a written record that states who you want to make healthcare choices for you when you are unable to make them for yourself  This person, called a proxy, is usually a family member or a friend  You may choose more than 1 proxy  · Do not resuscitate (DNR) order:  A DNR order is used in case your heart stops beating or you stop breathing  It is a request not to have certain forms of treatment, such as CPR  A DNR order may be included in other types of advance directives  · Medical directive: This covers the care that you want if you are in a coma, near death, or unable to make decisions for yourself  You can list the treatments you want for each condition  Treatment may include pain medicine, surgery, blood transfusions, dialysis, IV or tube feedings, and a ventilator (breathing machine)  · Values history: This document has questions about your views, beliefs, and how you feel and think about life  This information can help others choose the care that you would choose  Why are advance directives important? An advance directive helps you control your care  Although spoken wishes may be used, it is better to have your wishes written down  Spoken wishes can be misunderstood, or not followed  Treatments may be given even if you do not want them  An advance directive may make it easier for your family to make difficult choices about your care     Weight Management   Why it is important to manage your weight:  Being overweight increases your risk of health conditions such as heart disease, high blood pressure, type 2 diabetes, and certain types of cancer  It can also increase your risk for osteoarthritis, sleep apnea, and other respiratory problems  Aim for a slow, steady weight loss  Even a small amount of weight loss can lower your risk of health problems  How to lose weight safely:  A safe and healthy way to lose weight is to eat fewer calories and get regular exercise  You can lose up about 1 pound a week by decreasing the number of calories you eat by 500 calories each day  Healthy meal plan for weight management:  A healthy meal plan includes a variety of foods, contains fewer calories, and helps you stay healthy  A healthy meal plan includes the following:  · Eat whole-grain foods more often  A healthy meal plan should contain fiber  Fiber is the part of grains, fruits, and vegetables that is not broken down by your body  Whole-grain foods are healthy and provide extra fiber in your diet  Some examples of whole-grain foods are whole-wheat breads and pastas, oatmeal, brown rice, and bulgur  · Eat a variety of vegetables every day  Include dark, leafy greens such as spinach, kale, mila greens, and mustard greens  Eat yellow and orange vegetables such as carrots, sweet potatoes, and winter squash  · Eat a variety of fruits every day  Choose fresh or canned fruit (canned in its own juice or light syrup) instead of juice  Fruit juice has very little or no fiber  · Eat low-fat dairy foods  Drink fat-free (skim) milk or 1% milk  Eat fat-free yogurt and low-fat cottage cheese  Try low-fat cheeses such as mozzarella and other reduced-fat cheeses  · Choose meat and other protein foods that are low in fat  Choose beans or other legumes such as split peas or lentils  Choose fish, skinless poultry (chicken or turkey), or lean cuts of red meat (beef or pork)   Before you cook meat or poultry, cut off any visible fat  · Use less fat and oil  Try baking foods instead of frying them  Add less fat, such as margarine, sour cream, regular salad dressing and mayonnaise to foods  Eat fewer high-fat foods  Some examples of high-fat foods include french fries, doughnuts, ice cream, and cakes  · Eat fewer sweets  Limit foods and drinks that are high in sugar  This includes candy, cookies, regular soda, and sweetened drinks  Exercise:  Exercise at least 30 minutes per day on most days of the week  Some examples of exercise include walking, biking, dancing, and swimming  You can also fit in more physical activity by taking the stairs instead of the elevator or parking farther away from stores  Ask your healthcare provider about the best exercise plan for you  © Copyright [employer] 2018 Information is for End User's use only and may not be sold, redistributed or otherwise used for commercial purposes  All illustrations and images included in CareNotes® are the copyrighted property of [employer]  or [facility] Preventive Visit Patient Instructions  Thank you for completing your Welcome to Medicare Visit or Medicare Annual Wellness Visit today  Your next wellness visit will be due in one year (1/10/2024)  The screening/preventive services that you may require over the next 5-10 years are detailed below  Some tests may not apply to you based off risk factors and/or age  Screening tests ordered at today's visit but not completed yet may show as past due  Also, please note that scanned in results may not display below    Preventive Screenings:  Service Recommendations Previous Testing/Comments   Colorectal Cancer Screening  * Colonoscopy    * Fecal Occult Blood Test (FOBT)/Fecal Immunochemical Test (FIT)  * Fecal DNA/Cologuard Test  * Flexible Sigmoidoscopy Age: 39-70 years old   Colonoscopy: every 10 years (may be performed more frequently if at higher risk)  OR  FOBT/FIT: every 1 year OR  Cologuard: every 3 years  OR  Sigmoidoscopy: every 5 years  Screening may be recommended earlier than age 39 if at higher risk for colorectal cancer  Also, an individualized decision between you and your healthcare provider will decide whether screening between the ages of 74-80 would be appropriate  Colonoscopy: 08/22/2018  FOBT/FIT: Not on file  Cologuard: Not on file  Sigmoidoscopy: Not on file    Screening Current     Breast Cancer Screening Age: 36 years old  Frequency: every 1-2 years  Not required if history of left and right mastectomy Mammogram: 10/27/2022    Screening Current   Cervical Cancer Screening Between the ages of 21-29, pap smear recommended once every 3 years  Between the ages of 33-67, can perform pap smear with HPV co-testing every 5 years  Recommendations may differ for women with a history of total hysterectomy, cervical cancer, or abnormal pap smears in past  Pap Smear: 04/05/2022    Screening Not Indicated   Hepatitis C Screening Once for adults born between 1945 and 1965  More frequently in patients at high risk for Hepatitis C Hep C Antibody: Not on file    Screening Current   Diabetes Screening 1-2 times per year if you're at risk for diabetes or have pre-diabetes Fasting glucose: 101 mg/dL (3/14/2022)  A1C: No results in last 5 years (No results in last 5 years)  Screening Current   Cholesterol Screening Once every 5 years if you don't have a lipid disorder  May order more often based on risk factors  Lipid panel: 03/14/2022    Screening Not Indicated  History Lipid Disorder     Other Preventive Screenings Covered by Medicare:  6  Abdominal Aortic Aneurysm (AAA) Screening: covered once if your at risk  You're considered to be at risk if you have a family history of AAA    7  Lung Cancer Screening: covers low dose CT scan once per year if you meet all of the following conditions: (1) Age 50-69; (2) No signs or symptoms of lung cancer; (3) Current smoker or have quit smoking within the last 15 years; (4) You have a tobacco smoking history of at least 20 pack years (packs per day multiplied by number of years you smoked); (5) You get a written order from a healthcare provider  8  Glaucoma Screening: covered annually if you're considered high risk: (1) You have diabetes OR (2) Family history of glaucoma OR (3)  aged 48 and older OR (3)  American aged 72 and older  5  Osteoporosis Screening: covered every 2 years if you meet one of the following conditions: (1) You're estrogen deficient and at risk for osteoporosis based off medical history and other findings; (2) Have a vertebral abnormality; (3) On glucocorticoid therapy for more than 3 months; (4) Have primary hyperparathyroidism; (5) On osteoporosis medications and need to assess response to drug therapy  · Last bone density test (DXA Scan): Not on file  10  HIV Screening: covered annually if you're between the age of 12-76  Also covered annually if you are younger than 13 and older than 72 with risk factors for HIV infection  For pregnant patients, it is covered up to 3 times per pregnancy      Immunizations:  Immunization Recommendations   Influenza Vaccine Annual influenza vaccination during flu season is recommended for all persons aged >= 6 months who do not have contraindications   Pneumococcal Vaccine   * Pneumococcal conjugate vaccine = PCV13 (Prevnar 13), PCV15 (Vaxneuvance), PCV20 (Prevnar 20)  * Pneumococcal polysaccharide vaccine = PPSV23 (Pneumovax) Adults 25-60 years old: 1-3 doses may be recommended based on certain risk factors  Adults 72 years old: 1-2 doses may be recommended based off what pneumonia vaccine you previously received   Hepatitis B Vaccine 3 dose series if at intermediate or high risk (ex: diabetes, end stage renal disease, liver disease)   Tetanus (Td) Vaccine - COST NOT COVERED BY MEDICARE PART B Following completion of primary series, a booster dose should be given every 10 years to maintain immunity against tetanus  Td may also be given as tetanus wound prophylaxis  Tdap Vaccine - COST NOT COVERED BY MEDICARE PART B Recommended at least once for all adults  For pregnant patients, recommended with each pregnancy  Shingles Vaccine (Shingrix) - COST NOT COVERED BY MEDICARE PART B  2 shot series recommended in those aged 48 and above     Health Maintenance Due:      Topic Date Due   • Colorectal Cancer Screening  Never done   • HIV Screening  02/09/2023 (Originally 12/18/1970)   • Hepatitis C Screening  01/09/2024 (Originally 1955)   • Breast Cancer Screening: Mammogram  10/27/2023     Immunizations Due:      Topic Date Due   • Hepatitis B Vaccine (1 of 3 - 3-dose series) Never done   • Pneumococcal Vaccine: 65+ Years (2 - PCV) 01/14/2010   • COVID-19 Vaccine (4 - Booster for Yas series) 10/24/2022     Advance Directives   What are advance directives? Advance directives are legal documents that state your wishes and plans for medical care  These plans are made ahead of time in case you lose your ability to make decisions for yourself  Advance directives can apply to any medical decision, such as the treatments you want, and if you want to donate organs  What are the types of advance directives? There are many types of advance directives, and each state has rules about how to use them  You may choose a combination of any of the following:  · Living will: This is a written record of the treatment you want  You can also choose which treatments you do not want, which to limit, and which to stop at a certain time  This includes surgery, medicine, IV fluid, and tube feedings  · Durable power of  for healthcare [facility]): This is a written record that states who you want to make healthcare choices for you when you are unable to make them for yourself  This person, called a proxy, is usually a family member or a friend  You may choose more than 1 proxy    · Do not resuscitate (DNR) order:  A DNR order is used in case your heart stops beating or you stop breathing  It is a request not to have certain forms of treatment, such as CPR  A DNR order may be included in other types of advance directives  · Medical directive: This covers the care that you want if you are in a coma, near death, or unable to make decisions for yourself  You can list the treatments you want for each condition  Treatment may include pain medicine, surgery, blood transfusions, dialysis, IV or tube feedings, and a ventilator (breathing machine)  · Values history: This document has questions about your views, beliefs, and how you feel and think about life  This information can help others choose the care that you would choose  Why are advance directives important? An advance directive helps you control your care  Although spoken wishes may be used, it is better to have your wishes written down  Spoken wishes can be misunderstood, or not followed  Treatments may be given even if you do not want them  An advance directive may make it easier for your family to make difficult choices about your care  Urinary Incontinence   Urinary incontinence (UI)  is when you lose control of your bladder  UI develops because your bladder cannot store or empty urine properly  The 3 most common types of UI are stress incontinence, urge incontinence, or both  Medicines:   · May be given to help strengthen your bladder control  Report any side effects of medication to your healthcare provider  Do pelvic muscle exercises often:  Your pelvic muscles help you stop urinating  Squeeze these muscles tight for 5 seconds, then relax for 5 seconds  Gradually work up to squeezing for 10 seconds  Do 3 sets of 15 repetitions a day, or as directed  This will help strengthen your pelvic muscles and improve bladder control    Train your bladder:  Go to the bathroom at set times, such as every 2 hours, even if you do not feel the urge to go  You can also try to hold your urine when you feel the urge to go  For example, hold your urine for 5 minutes when you feel the urge to go  As that becomes easier, hold your urine for 10 minutes  Self-care:   · Keep a UI record  Write down how often you leak urine and how much you leak  Make a note of what you were doing when you leaked urine  · Drink liquids as directed  You may need to limit the amount of liquid you drink to help control your urine leakage  Do not drink any liquid right before you go to bed  Limit or do not have drinks that contain caffeine or alcohol  · Prevent constipation  Eat a variety of high-fiber foods  Good examples are high-fiber cereals, beans, vegetables, and whole-grain breads  Walking is the best way to trigger your intestines to have a bowel movement  · Exercise regularly and maintain a healthy weight  Weight loss and exercise will decrease pressure on your bladder and help you control your leakage  · Use a catheter as directed  to help empty your bladder  A catheter is a tiny, plastic tube that is put into your bladder to drain your urine  · Go to behavior therapy as directed  Behavior therapy may be used to help you learn to control your urge to urinate  Weight Management   Why it is important to manage your weight:  Being overweight increases your risk of health conditions such as heart disease, high blood pressure, type 2 diabetes, and certain types of cancer  It can also increase your risk for osteoarthritis, sleep apnea, and other respiratory problems  Aim for a slow, steady weight loss  Even a small amount of weight loss can lower your risk of health problems  How to lose weight safely:  A safe and healthy way to lose weight is to eat fewer calories and get regular exercise  You can lose up about 1 pound a week by decreasing the number of calories you eat by 500 calories each day     Healthy meal plan for weight management:  A healthy meal plan includes a variety of foods, contains fewer calories, and helps you stay healthy  A healthy meal plan includes the following:  · Eat whole-grain foods more often  A healthy meal plan should contain fiber  Fiber is the part of grains, fruits, and vegetables that is not broken down by your body  Whole-grain foods are healthy and provide extra fiber in your diet  Some examples of whole-grain foods are whole-wheat breads and pastas, oatmeal, brown rice, and bulgur  · Eat a variety of vegetables every day  Include dark, leafy greens such as spinach, kale, mila greens, and mustard greens  Eat yellow and orange vegetables such as carrots, sweet potatoes, and winter squash  · Eat a variety of fruits every day  Choose fresh or canned fruit (canned in its own juice or light syrup) instead of juice  Fruit juice has very little or no fiber  · Eat low-fat dairy foods  Drink fat-free (skim) milk or 1% milk  Eat fat-free yogurt and low-fat cottage cheese  Try low-fat cheeses such as mozzarella and other reduced-fat cheeses  · Choose meat and other protein foods that are low in fat  Choose beans or other legumes such as split peas or lentils  Choose fish, skinless poultry (chicken or turkey), or lean cuts of red meat (beef or pork)  Before you cook meat or poultry, cut off any visible fat  · Use less fat and oil  Try baking foods instead of frying them  Add less fat, such as margarine, sour cream, regular salad dressing and mayonnaise to foods  Eat fewer high-fat foods  Some examples of high-fat foods include french fries, doughnuts, ice cream, and cakes  · Eat fewer sweets  Limit foods and drinks that are high in sugar  This includes candy, cookies, regular soda, and sweetened drinks  Exercise:  Exercise at least 30 minutes per day on most days of the week  Some examples of exercise include walking, biking, dancing, and swimming   You can also fit in more physical activity by taking the stairs instead of the elevator or parking farther away from stores  Ask your healthcare provider about the best exercise plan for you  Alcohol Use and Your Health    Drinking too much can harm your health  Excessive alcohol use leads to about 88,000 death in the [place] each year, and shortens the life of those who diet by almost 30 years  Further, excessive drinking cost the economy $249 billion in 2010  Most excessive drinkers are not alcohol dependent  Excessive alcohol use has immediate effects that increase the risk of many harmful health conditions  These are most often the result of binge drinking  Over time, excessive alcohol use can lead to the development of chronic diseases and other series health problems  What is considered a "drink"? Excessive alcohol use includes:  · Binge Drinking: For women, 4 or more drinks consumed on one occasion  For men, 5 or more drinks consumed on one occasion  · Heavy Drinking: For women, 8 or more drinks per week  For men, 15 or more drinks per week  · Any alcohol used by pregnant women  · Any alcohol used by those under the age of 21 years    If you choose to drink, do so in moderation:  · Do not drink at all if you are under the age of 24, or if you are or may be pregnant, or have health problems that could be made worse by drinking    · For women, up to 1 drink per day  · For men, up to 2 drinks a day    No one should begin drinking or drink more frequently based on potential health benefits    Short-Term Health Risks:  · Injuries: motor vehicle crashes, falls, drownings, burns  · Violence: homicide, suicide, sexual assault, intimate partner violence  · Alcohol poisoning  · Reproductive health: risky sexual behaviors, unintended prengnacy, sexually transmitted diseases, miscarriage, stillbirth, fetal alcohol syndrome    Long-Term Health Risks:  · Chronic diseases: high blood pressure, heart disease, stroke, liver disease, digestive problems  · Cancers: breast, mouth and throat, liver, colon  · Learning and memory problems: dementia, poor school performance  · Mental health: depression, anxiety, insomnia  · Social problems: lost productivity, family problems, unemployment  · Alcohol dependence    For support and more information:  · Substance Abuse and [place] [address] , [address] [place]  Web Address: https://ElectroJet/    · Alcoholics Anonymous        Web Address: http://Quikly/    https://www cdc gov/alcohol/fact-sheets/alcohol-use htm     © [address] [facility] 2018 Information is for End User's use only and may not be sold, redistributed or otherwise used for commercial purposes   All illustrations and images included in CareNotes® are the copyrighted property of A D A M , Inc  or [address] [place] [address]

## 2023-01-10 ENCOUNTER — TELEPHONE (OUTPATIENT)
Dept: ADMINISTRATIVE | Facility: OTHER | Age: 68
End: 2023-01-10

## 2023-01-10 NOTE — TELEPHONE ENCOUNTER
Upon review of the In Basket request we were able to locate, review, and update the patient chart as requested for CRC: Colonoscopy  Any additional questions or concerns should be emailed to the Practice Liaisons via the appropriate education email address, please do not reply via In Basket      Thank you  Yessi Hernández MA

## 2023-01-10 NOTE — TELEPHONE ENCOUNTER
----- Message from Brittanie Montaño MD sent at 1/9/2023  3:16 PM EST -----  01/09/23 3:16 PM    Hello, our patient Karoline Muir has had CRC: Colonoscopy completed/performed  Please assist in updating the patient chart by pulling the Care Everywhere (CE) document  The date of service is Aug 2018       Thank you,  Brittanie Montaño MD  [facility]

## 2023-01-11 ENCOUNTER — APPOINTMENT (OUTPATIENT)
Dept: LAB | Facility: CLINIC | Age: 68
End: 2023-01-11

## 2023-01-11 DIAGNOSIS — E53.8 B12 DEFICIENCY: ICD-10-CM

## 2023-01-11 DIAGNOSIS — I10 HYPERTENSION, ESSENTIAL, BENIGN: ICD-10-CM

## 2023-01-11 DIAGNOSIS — L40.50 PSORIATIC ARTHRITIS (HCC): ICD-10-CM

## 2023-01-11 DIAGNOSIS — E55.9 VITAMIN D DEFICIENCY: ICD-10-CM

## 2023-01-11 DIAGNOSIS — E78.00 PURE HYPERCHOLESTEROLEMIA: ICD-10-CM

## 2023-01-11 DIAGNOSIS — E55.9 VITAMIN D DEFICIENCY: Primary | ICD-10-CM

## 2023-01-11 LAB
25(OH)D3 SERPL-MCNC: 14.1 NG/ML (ref 30–100)
ALBUMIN SERPL BCP-MCNC: 4.1 G/DL (ref 3.5–5)
ALP SERPL-CCNC: 90 U/L (ref 46–116)
ALT SERPL W P-5'-P-CCNC: 19 U/L (ref 12–78)
ANION GAP SERPL CALCULATED.3IONS-SCNC: 5 MMOL/L (ref 4–13)
AST SERPL W P-5'-P-CCNC: 13 U/L (ref 5–45)
BASOPHILS # BLD AUTO: 0.06 THOUSANDS/ÂΜL (ref 0–0.1)
BASOPHILS NFR BLD AUTO: 1 % (ref 0–1)
BILIRUB SERPL-MCNC: 0.72 MG/DL (ref 0.2–1)
BUN SERPL-MCNC: 13 MG/DL (ref 5–25)
CALCIUM SERPL-MCNC: 9.3 MG/DL (ref 8.3–10.1)
CHLORIDE SERPL-SCNC: 107 MMOL/L (ref 96–108)
CHOLEST SERPL-MCNC: 241 MG/DL
CO2 SERPL-SCNC: 28 MMOL/L (ref 21–32)
CREAT SERPL-MCNC: 0.86 MG/DL (ref 0.6–1.3)
CRP SERPL QL: <3 MG/L
EOSINOPHIL # BLD AUTO: 0.11 THOUSAND/ÂΜL (ref 0–0.61)
EOSINOPHIL NFR BLD AUTO: 2 % (ref 0–6)
ERYTHROCYTE [DISTWIDTH] IN BLOOD BY AUTOMATED COUNT: 12.7 % (ref 11.6–15.1)
ERYTHROCYTE [SEDIMENTATION RATE] IN BLOOD: 5 MM/HOUR (ref 0–29)
GFR SERPL CREATININE-BSD FRML MDRD: 71 ML/MIN/1.73SQ M
GLUCOSE P FAST SERPL-MCNC: 89 MG/DL (ref 65–99)
HCT VFR BLD AUTO: 47.3 % (ref 34.8–46.1)
HDLC SERPL-MCNC: 85 MG/DL
HGB BLD-MCNC: 15.3 G/DL (ref 11.5–15.4)
IMM GRANULOCYTES # BLD AUTO: 0.01 THOUSAND/UL (ref 0–0.2)
IMM GRANULOCYTES NFR BLD AUTO: 0 % (ref 0–2)
LDLC SERPL CALC-MCNC: 136 MG/DL (ref 0–100)
LYMPHOCYTES # BLD AUTO: 1.91 THOUSANDS/ÂΜL (ref 0.6–4.47)
LYMPHOCYTES NFR BLD AUTO: 36 % (ref 14–44)
MCH RBC QN AUTO: 30.8 PG (ref 26.8–34.3)
MCHC RBC AUTO-ENTMCNC: 32.3 G/DL (ref 31.4–37.4)
MCV RBC AUTO: 95 FL (ref 82–98)
MONOCYTES # BLD AUTO: 0.5 THOUSAND/ÂΜL (ref 0.17–1.22)
MONOCYTES NFR BLD AUTO: 9 % (ref 4–12)
NEUTROPHILS # BLD AUTO: 2.78 THOUSANDS/ÂΜL (ref 1.85–7.62)
NEUTS SEG NFR BLD AUTO: 52 % (ref 43–75)
NRBC BLD AUTO-RTO: 0 /100 WBCS
PLATELET # BLD AUTO: 358 THOUSANDS/UL (ref 149–390)
PMV BLD AUTO: 9.6 FL (ref 8.9–12.7)
POTASSIUM SERPL-SCNC: 4 MMOL/L (ref 3.5–5.3)
PROT SERPL-MCNC: 7.7 G/DL (ref 6.4–8.4)
RBC # BLD AUTO: 4.97 MILLION/UL (ref 3.81–5.12)
SODIUM SERPL-SCNC: 140 MMOL/L (ref 135–147)
TRIGL SERPL-MCNC: 101 MG/DL
WBC # BLD AUTO: 5.37 THOUSAND/UL (ref 4.31–10.16)

## 2023-01-11 RX ORDER — ERGOCALCIFEROL 1.25 MG/1
50000 CAPSULE ORAL WEEKLY
Qty: 12 CAPSULE | Refills: 0 | Status: SHIPPED | OUTPATIENT
Start: 2023-01-11 | End: 2023-04-11

## 2023-01-12 ENCOUNTER — OFFICE VISIT (OUTPATIENT)
Dept: INTERNAL MEDICINE CLINIC | Facility: CLINIC | Age: 68
End: 2023-01-12

## 2023-01-12 VITALS
SYSTOLIC BLOOD PRESSURE: 138 MMHG | WEIGHT: 160 LBS | DIASTOLIC BLOOD PRESSURE: 84 MMHG | HEART RATE: 68 BPM | BODY MASS INDEX: 25.11 KG/M2 | OXYGEN SATURATION: 97 % | HEIGHT: 67 IN

## 2023-01-12 DIAGNOSIS — E55.9 VITAMIN D DEFICIENCY: ICD-10-CM

## 2023-01-12 DIAGNOSIS — Z71.2 ENCOUNTER TO DISCUSS TEST RESULTS: ICD-10-CM

## 2023-01-12 DIAGNOSIS — R22.2 PALPABLE MASS OF LOWER BACK: Primary | ICD-10-CM

## 2023-01-12 LAB — VIT B12 SERPL-MCNC: 570 PG/ML (ref 100–900)

## 2023-01-12 NOTE — PROGRESS NOTES
Assessment/Plan:    1  Palpable mass of lower back  -     US superficial lump (non extremity); Future; Expected date: 01/12/2023    2  Encounter to discuss test results    3  Vitamin D deficiency    The case discussed with patient using patient centered shared decision making  The patient was counseled regarding instructions for management,-- risk factor reductions,-- prognosis,-- impressions,-- risks and benefits of treatment options,-- importance of compliance with treatment  I have reviewed the instructions with the patient, answering all questions to her satisfaction  Pt reassured    1  Superficial mass lower back   Query dermoid or ganglion cyst  Check us to identify type  Treatment plan to be determined    2  Borderline lipids  Cont heart healthy diet, exercise  Recheck as scheduled    3  Serum Vit D =14  Treatment plan per pcp            There are no Patient Instructions on file for this visit  Return if symptoms worsen or fail to improve  Subjective:      Patient ID: Tennille Alcantar is a 74 y o  female  Chief Complaint   Patient presents with   • Consult     Review blood work   • Mass     Soft mass on lower back; tender area around       Here for review of labs    Overall nl cmp, cbc, sed rate, crp  ldl borderline (had recent normal cardiac testing for risk stratification)  Vit d is 14  Prescription strength prescribed    Also discovered painful lump lower back recently        The following portions of the patient's history were reviewed and updated as appropriate: allergies, current medications, past family history, past medical history, past social history, past surgical history and problem list     Review of Systems   Constitutional: Negative      Musculoskeletal:        Per hpi         Current Outpatient Medications   Medication Sig Dispense Refill   • Calcium-Vitamin D-Vitamin K (Calcium + D) 500-1000-40 MG-UNT-MCG CHEW      • Cholecalciferol (Vitamin D3) 50 MCG (2000 UT) capsule      • ergocalciferol (VITAMIN D2) 50,000 units Take 1 capsule (50,000 Units total) by mouth once a week 12 capsule 0   • fluticasone (FLONASE) 50 mcg/act nasal spray SPRAY 2 SPRAYS INTO EACH NOSTRIL EVERY DAY (Patient taking differently: if needed) 48 mL 1   • guselkumab (TREMFYA) subcutaneous injection Inject 100 mg under the skin every 2 (two) months Medication on hold until insurance clearance     • Omega-3 Fatty Acids (fish oil) 1,000 mg      • omeprazole (PriLOSEC) 20 mg delayed release capsule Take 1 capsule (20 mg total) by mouth every morning 90 capsule 6   • psyllium (METAMUCIL) 58 6 % packet Take 1 packet by mouth 2 (two) times a day     • Tiadylt  MG 24 hr capsule Take 120 mg by mouth daily     • traZODone (DESYREL) 50 mg tablet Take 0 5 tablets (25 mg total) by mouth daily at bedtime as needed for sleep 90 tablet 1   • Turmeric 400 MG CAPS Take by mouth     • amoxicillin (AMOXIL) 500 mg capsule Prior to dental appts (Patient not taking: Reported on 1/9/2023)       No current facility-administered medications for this visit  Objective:    /84 (BP Location: Left arm, Patient Position: Sitting, Cuff Size: Standard)   Pulse 68   Ht 5' 6 5" (1 689 m)   Wt 72 6 kg (160 lb)   SpO2 97%   BMI 25 44 kg/m²        Physical Exam  Vitals and nursing note reviewed  Constitutional:       General: She is not in acute distress  Appearance: Normal appearance  Musculoskeletal:        Back:    Neurological:      Mental Status: She is alert                  Donovan Boeck, [place]

## 2023-01-14 ENCOUNTER — HOSPITAL ENCOUNTER (OUTPATIENT)
Dept: ULTRASOUND IMAGING | Facility: HOSPITAL | Age: 68
Discharge: HOME | End: 2023-01-14

## 2023-01-14 DIAGNOSIS — R22.2 PALPABLE MASS OF LOWER BACK: ICD-10-CM

## 2023-01-18 ENCOUNTER — HOSPITAL ENCOUNTER (OUTPATIENT)
Dept: BONE DENSITY | Facility: IMAGING CENTER | Age: 68
Discharge: HOME | End: 2023-01-18

## 2023-01-18 VITALS — BODY MASS INDEX: 25.12 KG/M2 | WEIGHT: 160.05 LBS | HEIGHT: 67 IN

## 2023-01-18 DIAGNOSIS — N95.9 UNSPECIFIED MENOPAUSAL AND PERIMENOPAUSAL DISORDER: ICD-10-CM

## 2023-01-18 DIAGNOSIS — Z13.820 SCREENING FOR OSTEOPOROSIS: ICD-10-CM

## 2023-01-19 ENCOUNTER — CLINICAL SUPPORT (OUTPATIENT)
Dept: INTERNAL MEDICINE CLINIC | Facility: CLINIC | Age: 68
End: 2023-01-19

## 2023-01-19 ENCOUNTER — TELEPHONE (OUTPATIENT)
Dept: OTHER | Facility: OTHER | Age: 68
End: 2023-01-19

## 2023-01-19 DIAGNOSIS — R93.7 ABNORMAL FINDINGS ON DIAGNOSTIC IMAGING OF MUSCULOSKELETAL SYSTEM: Primary | ICD-10-CM

## 2023-01-19 DIAGNOSIS — K58.9 IRRITABLE BOWEL SYNDROME, UNSPECIFIED TYPE: Primary | ICD-10-CM

## 2023-01-19 DIAGNOSIS — E55.9 VITAMIN D DEFICIENCY: Primary | ICD-10-CM

## 2023-01-19 RX ORDER — VITAMIN E 268 MG
400 CAPSULE ORAL DAILY
COMMUNITY

## 2023-01-19 RX ORDER — DICYCLOMINE HYDROCHLORIDE 10 MG/1
10 CAPSULE ORAL
Qty: 90 CAPSULE | Refills: 1 | Status: SHIPPED | OUTPATIENT
Start: 2023-01-19 | End: 2023-01-28

## 2023-01-19 NOTE — PROGRESS NOTES
[id number] [facility]    Patient presents for medication review and education  Luann Way did not present with all prescribed and OTC medications for medication review      SUBJECTIVE                                                                                                                        Medication list reviewed with patient, reports the following discrepancies/problems:    Calcium + D Chew: uncertain amounts of calcium / vit d3 in supplement  Ergocalciferol: started taking weekly  Omeprazole: stopped taking due to concern for possible adverse drug reaction, has Tums that she takes as needed at home occasionally  Vitamin D3: stopped taking daily Vit d3 when she started ergo      Med cost concerns: no    OBJECTIVE                                                                                                                              BP Readings from Last 3 Encounters:   01/12/23 138/84   01/09/23 130/70   12/07/22 130/92       Pulse Readings from Last 3 Encounters:   01/12/23 68   12/07/22 77   10/07/22 72       Lab Results   Component Value Date    SODIUM 140 01/11/2023    K 4 0 01/11/2023     01/11/2023    CO2 28 01/11/2023    BUN 13 01/11/2023    CREATININE 0 86 01/11/2023    CALCIUM 9 3 01/11/2023       No results found for: HGBA1C    Lab Results   Component Value Date    CHOLESTEROL 241 (H) 01/11/2023    CHOLESTEROL 214 (H) 03/14/2022     Lab Results   Component Value Date    HDL 85 01/11/2023     03/14/2022     Lab Results   Component Value Date    TRIG 101 01/11/2023    TRIG 63 03/14/2022     Lab Results   Component Value Date    FLPW57DGETGD 14 1 (L) 01/11/2023       Lab Results   Component Value Date    TCIMSKNX26 570 01/11/2023       No results found for: IRON, TIBC, FERRITIN      ASSESSMENT/PLAN                                                                                                             Patient was counseled on current active medications which included names, dosages, indications and directions for use  Disease State  Assessment Recommendations   Supplementation Serum Vit D low, currently on ergo  Taking several individual supplements May benefit from updating serum Vit D 3-4 months after completing ergo  Will follow-up with PCP  Reviewed patient could consider consolidating to MVI to reduce pill burden, voiced understanding and will consider when she uses up current supply    Gerd omeprazole Reviewed risk/benefit of continued use, also reviewed patient could trial famotidine or Tums as needed                                         Follow-Up: may schedule follow-up if interested in future    Pharmacist Tracking Tool  Reason For Outreach: Embedded Pharmacist  Demographics:  Intervention Method:  In Person  Type of Intervention: New  Topics Addressed: Polypharmacy  Pharmacologic Interventions: Prevent or Manage FILIPE, Drug Interaction  and Med Rec  Non-Pharmacologic Interventions: Care coordination, Disease state education and Medication Monitoring  Time:  Direct Patient Care: 15 mins   Care Coordination: 10 mins  Recommendation Recipient: Patient/Caregiver  Outcome: Accepted

## 2023-01-19 NOTE — PATIENT INSTRUCTIONS
As an alternative to Prilosec (omeprazole), you can try taking Pepcid (famotidine) or Tums as needed

## 2023-01-19 NOTE — TELEPHONE ENCOUNTER
Patient requesting a call back to discuss test results         Ordering Provider:  Dr David Salazar   Date Completed: 01/18/2023    Lab []  MRI []  X-Ray []  CT []  Colonoscopy []  EGD []  Biopsy []  Other [x] Bone density

## 2023-01-20 ENCOUNTER — NURSE TRIAGE (OUTPATIENT)
Dept: PHYSICAL THERAPY | Facility: OTHER | Age: 68
End: 2023-01-20

## 2023-01-20 ENCOUNTER — TELEPHONE (OUTPATIENT)
Dept: INTERNAL MEDICINE CLINIC | Facility: CLINIC | Age: 68
End: 2023-01-20

## 2023-01-20 DIAGNOSIS — M54.50 ACUTE RIGHT-SIDED LOW BACK PAIN WITHOUT SCIATICA: Primary | ICD-10-CM

## 2023-01-20 NOTE — TELEPHONE ENCOUNTER
Reviewed us of low back soft tissue  It was normal and revealed no mass, cyst, lesion  Likely lipoma or physiologic fatty tissue  Can consider ct if lesion grows or pt experience other symptoms  Suspect that the lump is not causing the back pain, sciatica  Questions answered to the best of my ability    Pt self referred to [facility]   She has an appointment today    She will reach out prn

## 2023-01-20 NOTE — TELEPHONE ENCOUNTER
----- Message from Luann Way sent at 1/20/2023  9:25 AM EST -----  Regarding: Please call me about ultrasound result  Contact: [phone]  Lm Perkins, anytime between now and 12    Thanks

## 2023-01-20 NOTE — TELEPHONE ENCOUNTER
Additional Information  • Negative: Is this related to a work injury? • Negative: Is this related to an MVA? • Negative: Are you currently recieving homecare services? • Negative: Has the patient had unexplained weight loss? • Negative: Does the patient have a fever? • Negative: Is the patient experiencing urine retention? • Negative: Is the patient experiencing acute drop foot or paralysis? • Negative: Has the patient experienced major trauma? (fall from height, high speed collision, direct blow to spine) and is also experiencing nausea, light-headedness, or loss of consciousness? • Negative: Is the patient experiencing blood in sputum? • Negative: Is this a chronic condition? Background - Initial Assessment  Clinical complaint: right lower back pain  Non radiating with no numbness or tingling  Sates this has been present for about 3 months  NKI  States she has a "palpable lump in the right lower back "  Date of onset: 3 months  Frequency of pain: intermittent  Quality of pain: aching and dull "annoying"    Protocols used: [facility] COMPREHENSIVE SPINE PROGRAM PROTOCOL    This RN did review in detail the Comprehensive Sp[facility] Program and what we can provide for their back pain  Patient is agreeable to being triaged by this RN and would like to proceed with Physical Therapy  Referral was placed for Physical Therapy at the [facility] site  Patients information was sent to the  to make evaluation appointment  Patient made aware that the PT office  will be calling to schedule the appointment  Patient was provided with the phone number to the PT office  No further questions and/or concerns were voiced by the patient at this time  Patient states understanding of the referral that was placed

## 2023-01-26 ENCOUNTER — EVALUATION (OUTPATIENT)
Dept: PHYSICAL THERAPY | Facility: REHABILITATION | Age: 68
End: 2023-01-26

## 2023-01-26 DIAGNOSIS — M54.50 ACUTE RIGHT-SIDED LOW BACK PAIN WITHOUT SCIATICA: ICD-10-CM

## 2023-01-26 NOTE — PROGRESS NOTES
PT Evaluation     Today's date: 2023  Patient name: Destiny Smith  : 1955  MRN: [id number]  Referring provider: Viktoria Asher PT  Dx:   Encounter Diagnosis     ICD-10-CM    1  Acute right-sided low back pain without sciatica  M54 50 Ambulatory referral to PT spine          Start Time: 1150  Stop Time: 1240  Total time in clinic (min): 50 minutes    Assessment  Assessment details: Destiny Smith is a 74 y o  female who presents to therapy for subacute R sided low back pain without LE sx's  Signs and sx's are consistent with poor core stabilization  She presents with decreased TA and glute strength, and pain that is worse with prolonged static positions, better with movement  Pt will benefit from skilled outpatient PT to improve strength, to address therapy goals, to reduce pain, and improve function  Therapist explained to pt: findings of IE, rehab diagnosis, and POC  Pt-centered goals reviewed and confirmed by pt  Instruction also given for HEP  Pt expressed verbal agreement and understanding and verified understanding via teach back method  Pt also expressed satisfaction that their current concerns were addressed at the end of the session  Impairments: abnormal muscle firing, abnormal or restricted ROM, activity intolerance, impaired physical strength, lacks appropriate home exercise program and pain with function    Symptom irritability: moderateBarriers to therapy: None   Understanding of Dx/Px/POC: good   Prognosis: good    Goals  Short term goals: to be met in 3-4 weeks  Pt independent with initial HEP, rationale, technique and frequency, for ROM and pain control  Pt will report at least a 25% reduction in subjective pain complaints/symptoms to better manage ADLs and household chores  Improve max pain level by 2 points on the numeric pain rating scale indicating a clinically significant reduction in pain level    Pt will be able to effectively isolate and recruit the TrA without Valsalva or global abdominal contraction to improve lumbopelvic stability  Long term goals: to be met in 6-8 weeks  Pt will have full and pain free lumbar ROM to better manage ADLs and household chores  Pt will report a 75% or > reduction in subjective pain complaints/symptoms to better manage ADLs and household chores  Improve B hip abduction MMT to a 4/5 or greater for improved lumbopelvic stability  Pt will improve FOTO score to better then expected outcome indicating an overall improvement in pain and function   Pt will be able to perform ADLs, iADLS, and hobbies without pain or restriction indicating return to PLOF  Pt independent with rationale, technique and plan for performance of advanced HEP to ensure independent self-management of symptoms upon discharge  Achieve pts therapy goal: relief of pain    Plan  Patient would benefit from: skilled physical therapy  Planned modality interventions: TENS, thermotherapy: hydrocollator packs, traction, ultrasound, cryotherapy and low level laser therapy  Planned therapy interventions: body mechanics training, therapeutic training, therapeutic exercise, therapeutic activities, stretching, strengthening, neuromuscular re-education, patient education, home exercise program, functional ROM exercises, flexibility, manual therapy, Slaeh taping, joint mobilization and balance  Frequency: 1-2x/week  Duration in weeks: 8  Treatment plan discussed with: patient        Subjective Evaluation    History of Present Illness  Mechanism of injury: Pt presents for low back pain for 4-6 months  States she has psoriatic arthritis but decided to seek tx via comp spine program  Denies LE pain, denies n/t  Pain is more R sided  Pain is worse with sitting, but also sometimes with prolonged standing  Movement seems to be better such as playing pickle ball  Walking can be aggravating at times a well  Pain seems to be worse later in the day   Descrbies pain as gnawing at times, sometimes pain is very minimal  States she has a lump in the area and did have a US which was negative  Also had bone density scan and states they couldn't see L2  Pain does not disrupt sleep  Denies bowel/bladder changes  Pain  Current pain ratin  At best pain ratin  At worst pain ratin  Location: R low back  Quality: sharp and dull ache (gnawing)  Progression: worsening    Social Support  Lives in: [place] house  Lives with: alone    Employment status: not working  Treatments  No previous or current treatments  Patient Goals  Patient goals for therapy: decreased pain, increased strength, independence with ADLs/IADLs and return to sport/leisure activities  Patient goal: get rid of the pain and know what is causing it        Objective     Vitals  HR = 89  O2 = 96%  Temperature = 98 6  BP = 123/84      Reflexes  Patellar tendon: 2+ B  Achilles: 2+ B  Clonus:  Neg B    Lumbar   Flexion:  full and painfree  Extension:   full and pain free  Left lateral flexion: WNL  Right lateral flexion:  WNL  Left rotation:  WNL  Right rotation:  WNL     Strength/Myotome Testing   grossly 5/5  Hip abd   R = 3-/5  L = 3+/5      Muscle Activation   TrA: fair, poor control with LE movement       Tests   Lumbar     Left   neg slump test     Right   neg slump test         Precautions: HTN, psoriatic arthritis      Access Code: [email or web address]  URL: [email or web address]  Date: 2023  Prepared by: Marilyn Montaño    Exercises  • Supine Transversus Abdominis Bracing - Hands on Stomach - 10-20 x daily - 7 x weekly - 1 sets - 10 reps - 5 hold  • Clamshell - 1 x daily - 7 x weekly - 3 sets - 10 reps - 5 hold      FOTO   = 53/66    STarT Back screening score = 5/9; subscore (Q5-9) = 3  3 or < = low risk (1-2 visits)  4 or >, subscore 3 or < = mod risk (</= 8 visits)  4 or >, subscore 4 or > = high risk (</=18 visits)      Manuals                                                                 Neuro Re-Ed TA iso 5"x10            TA with clamshell 5"x10 ea            TA with march             TA with SLR             Pball march             Dying bug             paloff press             High plank with LE lift             High plank with reach             redcord multif                                                                 Ther Ex                                                                                                                     Ther Activity                                       Gait Training                                       Modalities

## 2023-01-28 DIAGNOSIS — K58.9 IRRITABLE BOWEL SYNDROME, UNSPECIFIED TYPE: ICD-10-CM

## 2023-01-28 RX ORDER — DICYCLOMINE HYDROCHLORIDE 10 MG/1
CAPSULE ORAL
Qty: 270 CAPSULE | Refills: 1 | Status: SHIPPED | OUTPATIENT
Start: 2023-01-28

## 2023-01-30 ENCOUNTER — OFFICE VISIT (OUTPATIENT)
Dept: PHYSICAL THERAPY | Facility: REHABILITATION | Age: 68
End: 2023-01-30

## 2023-01-30 DIAGNOSIS — M54.50 ACUTE RIGHT-SIDED LOW BACK PAIN WITHOUT SCIATICA: Primary | ICD-10-CM

## 2023-01-30 NOTE — PROGRESS NOTES
Daily Note     Today's date: 2023  Patient name: Cottie Lanes  : 1955  MRN: [id number]  Referring provider: Rossana Crews, PT  Dx:   Encounter Diagnosis     ICD-10-CM    1  Acute right-sided low back pain without sciatica  M54 50           Start Time:   Stop Time:   Total time in clinic (min): 50 minutes    Subjective: Pt states she is a little more sore today but notes she also has been doing [employer] workout videos including ab workouts  Objective: See treatment diary below      Assessment: Pt required initial cues to properly perform a TA isometric with pt bearing down vs performing a TA  With initial cue she was able to perform correctly  Pt responded well to all remaining exercises  Cues given during redcord to maintain neutral lumbar spine  Pt fatigued the most with redcord and clamshells with hip halo  Cues given t/out tx to avoid shoulder shrugging  Pt will benefit from continued skilled outpatient PT to improve strength, to address therapy goals, to reduce pain, and improve function  Plan: Continue per plan of care  Progress treatment as tolerated  Precautions: HTN, psoriatic arthritis      Access Code: [email or web address]  URL: [email or web address]  Date: 2023  Prepared by: Gilson Perales    Exercises  • Supine Transversus Abdominis Bracing - Hands on Stomach - 10-20 x daily - 7 x weekly - 1 sets - 10 reps - 5 hold  • Clamshell - 1 x daily - 7 x weekly - 3 sets - 10 reps - 5 hold      FOTO   = 53/66    STarT Back screening score = 5/9; subscore (Q5-9) = 3  3 or < = low risk (1-2 visits)  4 or >, subscore 3 or < = mod risk (</= 8 visits)  4 or >, subscore 4 or > = high risk (</=18 visits)      Manuals                                                                Neuro Re-Ed             TA iso 5"x10 5"x10           TA with clamshell 5"x10 ea [facility] 3x5 ea 5" hold           TA with march  1x10 ea           TA with SLR             Pball march  x20 ea           Dying bug             paloff press  seated on pball GTB x15 ea           High plank with LE lift  x10 ea           High plank with reach             redcord multif  iso holds, PT UE resistance, alt  elbow bends                                                               Ther Ex                                                                                                                     Ther Activity                                       Gait Training                                       Modalities             MHP neck post tx  8'

## 2023-02-02 ENCOUNTER — OFFICE VISIT (OUTPATIENT)
Dept: PHYSICAL THERAPY | Facility: REHABILITATION | Age: 68
End: 2023-02-02

## 2023-02-02 DIAGNOSIS — M54.50 ACUTE RIGHT-SIDED LOW BACK PAIN WITHOUT SCIATICA: Primary | ICD-10-CM

## 2023-02-02 NOTE — PROGRESS NOTES
Daily Note     Today's date: 2023  Patient name: Julissa Proctor  : 1955  MRN: [id number]  Referring provider: Kendall Gimenez, PT  Dx:   Encounter Diagnosis     ICD-10-CM    1  Acute right-sided low back pain without sciatica  M54 50           Start Time: 1615  Stop Time: 1700  Total time in clinic (min): 45 minutes    Subjective: Pt states she is a little sore as she just came from pickleball  Objective: See treatment diary below  Mild lateral shift that reduced with 15 lat shift corrections    Assessment: Pt responded well to the exercises with reduction in pain  Pt will benefit from continued skilled outpatient PT to improve strength, to address therapy goals, to reduce pain, and improve function  Plan: Continue per plan of care  Progress treatment as tolerated  Precautions: HTN, psoriatic arthritis      Access Code: [email or web address]  URL: [email or web address]  Date: 2023  Prepared by: Jos Ayala    Exercises  • Supine Transversus Abdominis Bracing - Hands on Stomach - 10-20 x daily - 7 x weekly - 1 sets - 10 reps - 5 hold  • Clamshell - 1 x daily - 7 x weekly - 3 sets - 10 reps - 5 hold      FOTO   = 53/66    STarT Back screening score = 5/9; subscore (Q5-9) = 3  3 or < = low risk (1-2 visits)  4 or >, subscore 3 or < = mod risk (</= 8 visits)  4 or >, subscore 4 or > = high risk (</=18 visits)      Manuals  2/2                                                              Neuro Re-Ed             TA iso 5"x10 5"x10 5"x15          TA with clamshell 5"x10 ea [facility] 3x5 ea 5" hold           TA with march  1x10 ea           TA with SLR   x15 ea          Pball march  x20 ea           Dying bug   2xF          paloff press  seated on pball GTB x15 ea           High plank with LE lift  x10 ea           High plank with reach             redcord multif  iso holds, PT UE resistance, alt  elbow bends           hooklying alt PT man resist at outstretched hands and knees   3x5 ea          qped multif lifts @ low mat   x15 ea          Mod side plank with hip abd   3x5 bilat                       Ther Ex                                                                                                                     Ther Activity                                       Gait Training                                       Modalities             MHP neck post tx  8'

## 2023-02-06 ENCOUNTER — OFFICE VISIT (OUTPATIENT)
Dept: PHYSICAL THERAPY | Facility: REHABILITATION | Age: 68
End: 2023-02-06

## 2023-02-06 DIAGNOSIS — M54.50 ACUTE RIGHT-SIDED LOW BACK PAIN WITHOUT SCIATICA: Primary | ICD-10-CM

## 2023-02-06 NOTE — PROGRESS NOTES
Daily Note     Today's date: 2023  Patient name: Dada Strange  : 1955  MRN: [id number]  Referring provider: Coretta Michelle, PT  Dx:   Encounter Diagnosis     ICD-10-CM    1  Acute right-sided low back pain without sciatica  M54 52           Start Time: 1728  Stop Time: 1813  Total time in clinic (min): 45 minutes    Subjective: Pt states she had a lot of pain over the weekend while she was visiting her mom but notes she was also sitting a lot  Objective: See treatment diary below  Mild lateral shift that reduced with lat shift corrections     Assessment: Favorable response to tx  Updated HEP as below  Focused on lat shift correction and advised pt to self correct any time she sees posture is off  Pt will benefit from continued skilled outpatient PT to improve strength, to address therapy goals, to reduce pain, and improve function  Plan: Continue per plan of care  Progress treatment as tolerated  Precautions: HTN, psoriatic arthritis      Access Code: [email or web address]  URL: [email or web address]  Date: 2023  Prepared by: Marilyn Montaño    Exercises  • Supine Transversus Abdominis Bracing - Hands on Stomach - 10-20 x daily - 7 x weekly - 1 sets - 10 reps - 5 hold  • Clamshell - 1 x daily - 7 x weekly - 3 sets - 10 reps - 5 hold  • Small Range Straight Leg Raise - 1 x daily - 7 x weekly - 2 sets - 15 reps - 3 hold  • Right Standing Lateral Shift Correction at [place] - 3-4 x daily - 7 x weekly - 2 sets - 20 reps - 2 hold  • Side Stepping with Resistance at Feet - 1 x daily - 7 x weekly - 3 sets - 20 reps          FOTO   = 53/66    STarT Back screening score = 5/9; subscore (Q5-9) = 3  3 or < = low risk (1-2 visits)  4 or >, subscore 3 or < = mod risk (</= 8 visits)  4 or >, subscore 4 or > = high risk (</=18 visits)      Manuals  2/2 2/                                                             Neuro Re-Ed             TA iso 5"x10 5"x10 5"x15 5"x20 TA with clamshell 5"x10 ea [place] HH 3x5 ea 5" hold           TA with march  1x10 ea           TA with SLR   x15 ea 2x15 ea HEP         Pball march  x20 ea           Dying bug   2xF 3x10         paloff press  seated on pball GTB x15 ea           High plank with LE lift  x10 ea           High plank with reach             redcord multif  iso holds, PT UE resistance, alt  elbow bends           hooklying alt PT man resist at outstretched hands and knees   3x5 ea          qped multif lifts @ low mat   x15 ea x15 ea 3# ankle weight at distal thigh         Mod side plank with hip abd   3x5 bilat          Lat shift correction    2x20 HEP         Ther Ex             Resisted side step    GTB x2min HEP                                                                                                    Ther Activity                                       Gait Training                                       Modalities             MHP neck post tx  8'

## 2023-02-09 ENCOUNTER — OFFICE VISIT (OUTPATIENT)
Dept: PHYSICAL THERAPY | Facility: REHABILITATION | Age: 68
End: 2023-02-09

## 2023-02-09 DIAGNOSIS — M54.50 ACUTE RIGHT-SIDED LOW BACK PAIN WITHOUT SCIATICA: Primary | ICD-10-CM

## 2023-02-09 NOTE — PROGRESS NOTES
Daily Note     Today's date: 2023  Patient name: Tavo Merchant  : 1955  MRN: [id number]  Referring provider: Riya Perdomo, PT  Dx:   Encounter Diagnosis     ICD-10-CM    1  Acute right-sided low back pain without sciatica  M54 52           Start Time: 1615  Stop Time: 1658  Total time in clinic (min): 43 minutes    Subjective: Pt states she feels the same  No pain with playing pickle ball today, but had pain while sitting on the sofa watching TV  Admits she was sitting with no lumbar support/poor posture  Switched to laying across a pball and this provided relief  Objective: See treatment diary below  Added lumbar ext prn/after sitting to HEP and use of lumbar support during sitting/driving    Assessment: Favorable response to tx  Updated HEP further to include above  Pt continues to present with mild lat shift that reduces with shift correction  Pt will benefit from continued skilled outpatient PT to improve strength, to address therapy goals, to reduce pain, and improve function  Plan: Continue per plan of care  Progress treatment as tolerated  Precautions: HTN, psoriatic arthritis      Access Code: [email or web address]  URL: [email or web address]  Date: 2023  Prepared by: Ren Azul    Exercises  • Supine Transversus Abdominis Bracing - Hands on Stomach - 10-20 x daily - 7 x weekly - 1 sets - 10 reps - 5 hold  • Clamshell - 1 x daily - 7 x weekly - 3 sets - 10 reps - 5 hold  • Small Range Straight Leg Raise - 1 x daily - 7 x weekly - 2 sets - 15 reps - 3 hold  • Right Standing Lateral Shift Correction at [place] - 3-4 x daily - 7 x weekly - 2 sets - 20 reps - 2 hold  • Side Stepping with Resistance at Feet - 1 x daily - 7 x weekly - 3 sets - 20 reps  • Standing Lumbar Extension with Counter - 1-5 x daily - 7 x weekly - 1 sets - 20 reps            FOTO   = 53/66    STarT Back screening score = 5/9; subscore (Q5-9) = 3  3 or < = low risk (1-2 visits)  4 or >, subscore 3 or < = mod risk (</= 8 visits)  4 or >, subscore 4 or > = high risk (</=18 visits)      Manuals 1/26 1/30 2/2 2/6 2/9                                                            Neuro Re-Ed             TA iso 5"x10 5"x10 5"x15 5"x20         TA with clamshell 5"x10 ea [place] HH 3x5 ea 5" hold           TA with march  1x10 ea           TA with SLR   x15 ea 2x15 ea HEP         Pball march  x20 ea           Dying bug   2xF 3x10         paloff press  seated on pball GTB x15 ea           bird dogs     x15 ea        birdge with hip abd band     Red HH 3x10        TA with tband ext     Blue 3x10                                  High plank with LE lift  x10 ea           High plank with reach             redcord multif  iso holds, PT UE resistance, alt  elbow bends           hooklying alt PT man resist at outstretched hands and knees   3x5 ea          qped multif lifts @ low mat   x15 ea x15 ea 3# ankle weight at distal thigh         Mod side plank with hip abd   3x5 bilat          Lat shift correction    2x20 HEP 2x20            Ther Ex             Resisted side step    GTB x2min HEP         Ext with fulcrum     x20        Sitting with lumbar support     Demo/edu                                                                         Ther Activity                                       Gait Training                                       Modalities             MHP neck post tx  8'

## 2023-02-13 ENCOUNTER — OFFICE VISIT (OUTPATIENT)
Dept: PHYSICAL THERAPY | Facility: REHABILITATION | Age: 68
End: 2023-02-13

## 2023-02-13 DIAGNOSIS — M54.50 ACUTE RIGHT-SIDED LOW BACK PAIN WITHOUT SCIATICA: Primary | ICD-10-CM

## 2023-02-13 NOTE — PROGRESS NOTES
Daily Note     Today's date: 2023  Patient name: Mikael Batista  : 1955  MRN: [id number]  Referring provider: Rolo Haque PT  Dx:   Encounter Diagnosis     ICD-10-CM    1  Acute right-sided low back pain without sciatica  M54 52           Start Time:   Stop Time:   Total time in clinic (min): 45 minutes    Subjective: Pt states she had a full day without any pain since last visit  Has been using the lumbar support which has been helping  Objective: See treatment diary below        Assessment: Pt responded well to the exercises and reported no pain post tx  Progress is supported by improvement by in FOTO score  Pt will benefit from continued skilled outpatient PT to improve strength, to address therapy goals, to reduce pain, and improve function  Plan: Continue per plan of care  Progress treatment as tolerated  Precautions: HTN, psoriatic arthritis      Access Code: [email or web address]  URL: [email or web address]  Date: 2023  Prepared by: Sanjay Course    Exercises  • Supine Transversus Abdominis Bracing - Hands on Stomach - 10-20 x daily - 7 x weekly - 1 sets - 10 reps - 5 hold  • Clamshell - 1 x daily - 7 x weekly - 3 sets - 10 reps - 5 hold  • Small Range Straight Leg Raise - 1 x daily - 7 x weekly - 2 sets - 15 reps - 3 hold  • Right Standing Lateral Shift Correction at [place] - 3-4 x daily - 7 x weekly - 2 sets - 20 reps - 2 hold  • Side Stepping with Resistance at Feet - 1 x daily - 7 x weekly - 3 sets - 20 reps  • Standing Lumbar Extension with Counter - 1-5 x daily - 7 x weekly - 1 sets - 20 reps      FOTO   = 53/66   = 75/66    STarT Back screening score = 5/9; subscore (Q5-9) = 3  3 or < = low risk (1-2 visits)  4 or >, subscore 3 or < = mod risk (</= 8 visits)  4 or >, subscore 4 or > = high risk (</=18 visits)      Manuals  2/ 2                                                           Neuro Re-Ed             TA iso 5"x10 5"x10 5"x15 5"x20         TA with clamshell 5"x10 ea [place] HH 3x5 ea 5" hold           TA with march  1x10 ea           TA with SLR   x15 ea 2x15 ea HEP         Pball march  x20 ea           Dying bug   2xF 3x10         paloff press  seated on pball GTB x15 ea    On pball with 1 leg off ground GTB x15 ea       bird dogs     x15 ea With red band x15 ea       bridge with hip abd band     Red HH 3x10 Red HH 3x10       TA with tband ext     Blue 3x10 With SLS GTB x15 ea LE       Seated TA on pball with plate weight "drive the bus"      5# 30"x3       Hip drivers, kneeling on sand dune      [place] Jband 2x15       High plank with LE lift  x10 ea    Alt UE/LE 2x10 ea       High plank with reach             redcord multif  iso holds, PT UE resistance, alt  elbow bends           hooklying alt PT man resist at outstretched hands and knees   3x5 ea          qped multif lifts @ low mat   x15 ea x15 ea 3# ankle weight at distal thigh         Mod side plank with hip abd   3x5 bilat          Lat shift correction    2x20 HEP 2x20     2x20       Ther Ex             Resisted side step    GTB x2min HEP         Ext with fulcrum     x20        Sitting with lumbar support     Demo/edu                                                                         Ther Activity                                       Gait Training                                       Modalities             MHP neck post tx  8'

## 2023-02-16 ENCOUNTER — OFFICE VISIT (OUTPATIENT)
Dept: PHYSICAL THERAPY | Facility: REHABILITATION | Age: 68
End: 2023-02-16

## 2023-02-16 DIAGNOSIS — M54.50 ACUTE RIGHT-SIDED LOW BACK PAIN WITHOUT SCIATICA: Primary | ICD-10-CM

## 2023-02-16 NOTE — PROGRESS NOTES
Daily Note     Today's date: 2023  Patient name: Woo Luevano  : 1955  MRN: [id number]  Referring provider: Glen Hanks, PT  Dx:   Encounter Diagnosis     ICD-10-CM    1  Acute right-sided low back pain without sciatica  M54 52           Start Time: 1615  Stop Time: 1658  Total time in clinic (min): 43 minutes    Subjective: Pt states her low back has been hurting with doing the dishes and sitting on the sofa  Her lumbar support just came in so she hasnt had the chance to use it yet, but has had to take ibuprofen due to her back aching  Objective: See treatment diary below        Assessment: Pt fatigued quickly with redcord multif and had difficulty maintaining pelvic control on the L with L arm lifts  She responded well to the exercises with report of no pain post tx  Pt edu to tuck tail and perform TA when doing the dishes at home  Pt will benefit from continued skilled outpatient PT to improve strength, to address therapy goals, to reduce pain, and improve function  Plan: Continue per plan of care  Progress treatment as tolerated  Precautions: HTN, psoriatic arthritis      Access Code: [email or web address]  URL: [email or web address]  Date: 2023  Prepared by: Anabel Knight    Exercises  • Supine Transversus Abdominis Bracing - Hands on Stomach - 10-20 x daily - 7 x weekly - 1 sets - 10 reps - 5 hold  • Clamshell - 1 x daily - 7 x weekly - 3 sets - 10 reps - 5 hold  • Small Range Straight Leg Raise - 1 x daily - 7 x weekly - 2 sets - 15 reps - 3 hold  • Right Standing Lateral Shift Correction at [place] - 3-4 x daily - 7 x weekly - 2 sets - 20 reps - 2 hold  • Side Stepping with Resistance at Feet - 1 x daily - 7 x weekly - 3 sets - 20 reps  • Standing Lumbar Extension with Counter - 1-5 x daily - 7 x weekly - 1 sets - 20 reps      FOTO   = 53/66   = 75/66    STarT Back screening score = 5/9; subscore (Q5-9) = 3  3 or < = low risk (1-2 visits)  4 or >, subscore 3 or < = mod risk (</= 8 visits)  4 or >, subscore 4 or > = high risk (</=18 visits)      Manuals 1/26 1/30 2/2 2/6 2/9 2/13 2/16                                                          Neuro Re-Ed             TA iso 5"x10 5"x10 5"x15 5"x20         TA with clamshell 5"x10 ea [place] HH 3x5 ea 5" hold           TA with march  1x10 ea           TA with SLR   x15 ea 2x15 ea HEP         Pball march  x20 ea           Dying bug   2xF 3x10         Seated on pball KB pallof press       3# 1" hold x30      paloff press  seated on pball GTB x15 ea    On pball with 1 leg off ground GTB x15 ea On pball with 1 leg off ground GTB x15 ea      bird dogs     x15 ea With red band x15 ea With red band x15 ea      bridge with hip abd band     Red HH 3x10 Red HH 3x10 Blue HH 3x10      TA with tband ext     Blue 3x10 With SLS GTB x15 ea LE On pball blue 2x10      Seated TA on pball with plate weight "drive the bus"      5# 30"x3 hold      Hip drivers, kneeling on sand dune      [place] Jband 2x15       High plank with LE lift  x10 ea    Alt UE/LE 2x10 ea       High plank with reach             redcord multif  iso holds, PT UE resistance, alt  elbow bends     multif 3xF ea      Dead lift with KB       10# 2x15      hooklying alt PT man resist at outstretched hands and knees   3x5 ea          qped multif lifts @ low mat   x15 ea x15 ea 3# ankle weight at distal thigh         Mod side plank with hip abd   3x5 bilat          Lat shift correction    2x20 HEP 2x20     2x20       Ther Ex             Resisted side step    GTB x2min HEP         Ext with fulcrum     x20        Sitting with lumbar support     Demo/edu                                                                         Ther Activity                                       Gait Training                                       Modalities             MHP neck post tx  8'

## 2023-02-20 ENCOUNTER — OFFICE VISIT (OUTPATIENT)
Dept: PHYSICAL THERAPY | Facility: REHABILITATION | Age: 68
End: 2023-02-20

## 2023-02-20 DIAGNOSIS — M54.50 ACUTE RIGHT-SIDED LOW BACK PAIN WITHOUT SCIATICA: Primary | ICD-10-CM

## 2023-02-20 NOTE — PROGRESS NOTES
Daily Note     Today's date: 2023  Patient name: Dante Benavides  : 1955  MRN: [id number]  Referring provider: Saad Hamilton PT  Dx:   Encounter Diagnosis     ICD-10-CM    1  Acute right-sided low back pain without sciatica  M54 52           Start Time:   Stop Time:   Total time in clinic (min): 43 minutes    Subjective: Pt reports she went hiking both days this weekend and her back did well  States she did have pain last night though and had to use the heating pad  Seems to be having less episodes of pain overall  Objective: See treatment diary below        Assessment: Pt responded well to progression in deadlift and pallof press exercises  Pt will benefit from continued skilled outpatient PT to improve strength, to address therapy goals, to reduce pain, and improve function  Plan: Continue per plan of care  Progress treatment as tolerated  Precautions: HTN, psoriatic arthritis      Access Code: [email or web address]  URL: [email or web address]  Date: 2023  Prepared by: Tevin Deleon    Exercises  • Supine Transversus Abdominis Bracing - Hands on Stomach - 10-20 x daily - 7 x weekly - 1 sets - 10 reps - 5 hold  • Clamshell - 1 x daily - 7 x weekly - 3 sets - 10 reps - 5 hold  • Small Range Straight Leg Raise - 1 x daily - 7 x weekly - 2 sets - 15 reps - 3 hold  • Right Standing Lateral Shift Correction at [place] - 3-4 x daily - 7 x weekly - 2 sets - 20 reps - 2 hold  • Side Stepping with Resistance at Feet - 1 x daily - 7 x weekly - 3 sets - 20 reps  • Standing Lumbar Extension with Counter - 1-5 x daily - 7 x weekly - 1 sets - 20 reps      FOTO   = 53/66   = 75/66    STarT Back screening score = 5/9; subscore (Q5-9) = 3  3 or < = low risk (1-2 visits)  4 or >, subscore 3 or < = mod risk (</= 8 visits)  4 or >, subscore 4 or > = high risk (</=18 visits)      Manuals  2 Neuro Re-Ed             TA iso 5"x10 5"x10 5"x15 5"x20         TA with clamshell 5"x10 ea [place] HH 3x5 ea 5" hold           TA with march  1x10 ea           TA with SLR   x15 ea 2x15 ea HEP         Pball march  x20 ea           Dying bug   2xF 3x10         Seated on pball KB pallof press       3# 1" hold x30 4# 1" hold x30     paloff press  seated on pball GTB x15 ea    On pball with 1 leg off ground GTB x15 ea On pball with 1 leg off ground GTB x15 ea On pball with 1 leg off ground BTB x15 ea     bird dogs     x15 ea With red band x15 ea With red band x15 ea np     bridge with hip abd band     Red HH 3x10 Red HH 3x10 Blue HH 3x10 On bosu blue HH 3x10     TA with tband ext     Blue 3x10 With SLS GTB x15 ea LE On pball blue 2x10 On pball blue 2x10     Seated TA on pball with plate weight "drive the bus"      5# 30"x3 hold      Hip drivers, kneeling on sand dune      [place] Jband 2x15       High plank with LE lift  x10 ea    Alt UE/LE 2x10 ea       High plank with reach             redcord multif  iso holds, PT UE resistance, alt  elbow bends     multif 3xF ea multif 3xF ea     Dead lift with KB       10# 2x15 15# 2x15     hooklying alt PT man resist at outstretched hands and knees   3x5 ea          qped multif lifts @ low mat   x15 ea x15 ea 3# ankle weight at distal thigh         Mod side plank with hip abd   3x5 bilat          Lat shift correction    2x20 HEP 2x20     2x20       Ther Ex             Resisted side step    GTB x2min HEP         Ext with fulcrum     x20        Sitting with lumbar support     Demo/edu                                                                         Ther Activity                                       Gait Training                                       Modalities             MHP neck post tx  8'

## 2023-02-23 ENCOUNTER — OFFICE VISIT (OUTPATIENT)
Dept: PHYSICAL THERAPY | Facility: REHABILITATION | Age: 68
End: 2023-02-23

## 2023-02-23 DIAGNOSIS — M54.50 ACUTE RIGHT-SIDED LOW BACK PAIN WITHOUT SCIATICA: Primary | ICD-10-CM

## 2023-02-23 DIAGNOSIS — M54.50 CHRONIC LOW BACK PAIN, UNSPECIFIED BACK PAIN LATERALITY, UNSPECIFIED WHETHER SCIATICA PRESENT: Primary | ICD-10-CM

## 2023-02-23 DIAGNOSIS — G89.29 CHRONIC LOW BACK PAIN, UNSPECIFIED BACK PAIN LATERALITY, UNSPECIFIED WHETHER SCIATICA PRESENT: Primary | ICD-10-CM

## 2023-02-23 NOTE — PROGRESS NOTES
Daily Note/re-eval     Today's date: 2023  Patient name: Lakshmi Obregon  : 1955  MRN: [id number]  Referring provider: Tiffany Oro, PT  Dx:   Encounter Diagnosis     ICD-10-CM    1  Acute right-sided low back pain without sciatica  M54 52           Start Time: 1616  Stop Time: 1701  Total time in clinic (min): 45 minutes    Subjective: Pt reports she can tell her L and R hip and core and not symmetrical from a strength perspective  Reports she still has pain and still uses the heating bad just not as often  Overall 50% improvement overall  Pain  Current pain ratin  At best pain ratin  At worst pain ratin in the past week, 9 at eval  Location: R low back  Quality: sharp and dull ache (gnawing)  Progression: worsening    Objective: See treatment diary below  Lumbar   Flexion:  full and painfree  Extension:   full and pain free  Left lateral flexion: WNL  Right lateral flexion:  WNL  Left rotation:  WNL  Right rotation:  WNL     Strength/Myotome Testing   Hip abd   R = 4-/5  L = 4+/5        Muscle Activation   TrA: fair, poor control with LE movement  At eval   : Good with improved LE control compared to eval        Tests   Lumbar      Left   neg slump test     Right   neg slump test    Assessment: Pt presents for her 9th therapy visit  She started particiapted in the comprehensive spine program  At this time she is progressing, but has not met all therapy goals and would benefit from continued therapy  Pt will be removed from the comp spine program at this time and placed in regular skilled PT    Pt will benefit from continued skilled outpatient PT to improve strength, to address therapy goals, to reduce pain, and improve function  Goals  Short term goals: to be met in 3-4 weeks  Pt independent with initial HEP, rationale, technique and frequency, for ROM and pain control   MET  Pt will report at least a 25% reduction in subjective pain complaints/symptoms to better manage ADLs and household chores  MET  Improve max pain level by 2 points on the numeric pain rating scale indicating a clinically significant reduction in pain level  IMPROVED  Pt will be able to effectively isolate and recruit the TrA without Valsalva or global abdominal contraction to improve lumbopelvic stability  MET    Long term goals: to be met in 6-8 weeks  Pt will have full and pain free lumbar ROM to better manage ADLs and household chores  Pt will report a 75% or > reduction in subjective pain complaints/symptoms to better manage ADLs and household chores  Improve B hip abduction MMT to a 4/5 or greater for improved lumbopelvic stability  Pt will improve FOTO score to better then expected outcome indicating an overall improvement in pain and function   Pt will be able to perform ADLs, iADLS, and hobbies without pain or restriction indicating return to PLOF  Pt independent with rationale, technique and plan for performance of advanced HEP to ensure independent self-management of symptoms upon discharge  Achieve pts therapy goal: relief of pain        Plan: Continue per plan of care  Progress treatment as tolerated  Precautions: HTN, psoriatic arthritis      Access Code: [email or web address]  URL: [email or web address]  Date: 02/09/2023  Prepared by: Mateo Nolan    Exercises  • Supine Transversus Abdominis Bracing - Hands on Stomach - 10-20 x daily - 7 x weekly - 1 sets - 10 reps - 5 hold  • Clamshell - 1 x daily - 7 x weekly - 3 sets - 10 reps - 5 hold  • Small Range Straight Leg Raise - 1 x daily - 7 x weekly - 2 sets - 15 reps - 3 hold  • Right Standing Lateral Shift Correction at [place] - 3-4 x daily - 7 x weekly - 2 sets - 20 reps - 2 hold  • Side Stepping with Resistance at Feet - 1 x daily - 7 x weekly - 3 sets - 20 reps  • Standing Lumbar Extension with Counter - 1-5 x daily - 7 x weekly - 1 sets - 20 reps      FOTO  1/26 = 53/66  2/13 = 75/66    STarT Back screening score = 5/9; subscore (Q5-9) = 3  3 or < = low risk (1-2 visits)  4 or >, subscore 3 or < = mod risk (</= 8 visits)  4 or >, subscore 4 or > = high risk (</=18 visits)      Manuals 1/26 1/30 2/2 2/6 2/9 2/13 2/16 2/20 2/23             RE - DS                                           Neuro Re-Ed             TA iso 5"x10 5"x10 5"x15 5"x20         TA with clamshell 5"x10 ea [place] HH 3x5 ea 5" hold           TA with march  1x10 ea           TA with SLR   x15 ea 2x15 ea HEP         Pball march  x20 ea           Dying bug   2xF 3x10         Seated on pball KB pallof press       3# 1" hold x30 4# 1" hold x30 4# 5"x6    paloff press  seated on pball GTB x15 ea    On pball with 1 leg off ground GTB x15 ea On pball with 1 leg off ground GTB x15 ea On pball with 1 leg off ground BTB x15 ea On pball with 1 leg off ground GTB x15 ea    bird dogs     x15 ea With red band x15 ea With red band x15 ea np With red band x15 ea    bridge with hip abd band     Red HH 3x10 Red HH 3x10 Blue HH 3x10 On bosu blue HH 3x10 On bosu blue HH 3x10    TA with tband ext     Blue 3x10 With SLS GTB x15 ea LE On pball blue 2x10 On pball blue 2x10 On pball GTB 2x10 5" hold    Seated TA on pball with plate weight "drive the bus"      5# 30"x3 hold      Hip drivers, kneeling on sand dune      [place] Jband 2x15       High plank with LE lift  x10 ea    Alt UE/LE 2x10 ea       High plank with reach             redcord multif  iso holds, PT UE resistance, alt  elbow bends     multif 3xF ea multif 3xF ea multif 3xF ea    Dead lift with KB       10# 2x15 15# 2x15 10# 2x15    hooklying alt PT man resist at outstretched hands and knees   3x5 ea          qped multif lifts @ low mat   x15 ea x15 ea 3# ankle weight at distal thigh         Mod side plank with hip abd   3x5 bilat          Lat shift correction    2x20 HEP 2x20     2x20       Ther Ex             Resisted side step    GTB x2min HEP         Ext with fulcrum     x20        Sitting with lumbar support     Demo/edu Ther Activity                                       Gait Training                                       Modalities             MHP neck post tx  8'

## 2023-02-27 ENCOUNTER — OFFICE VISIT (OUTPATIENT)
Dept: PHYSICAL THERAPY | Facility: REHABILITATION | Age: 68
End: 2023-02-27

## 2023-02-27 DIAGNOSIS — M54.50 ACUTE RIGHT-SIDED LOW BACK PAIN WITHOUT SCIATICA: Primary | ICD-10-CM

## 2023-02-27 NOTE — PROGRESS NOTES
Daily Note     Today's date: 2023  Patient name: Vasyl Quiros  : 1955  MRN: [id number]  Referring provider: Natali George, PT  Dx:   Encounter Diagnosis     ICD-10-CM    1  Acute right-sided low back pain without sciatica  M54 52                      Subjective: Pt reports she went on a 7 mile hike over the weekend  Notes her R LB did not bother her while hiking, but later that night did bother her  States her LB bothers her most in static positions, not while active  R LB pain has slowly been increasing in intensity throughout the day today and has been bothering her a little more than usual         Objective: See treatment diary below      Assessment: Tolerated treatment well  Continued with program as outlined below, focusing on core strength and stability  Most challenged today with bird dog and redcord, fatiguing along bilateral multifidus, requiring cues for proper form and muscular activation  Patient would benefit from continued PT to further improve strength, decrease pain, and maximize overall function  Plan: Continue per plan of care  Precautions: HTN, psoriatic arthritis      Access Code: [email or web address]  URL: [email or web address]  Date: 2023  Prepared by: Cloretta Janette    Exercises  • Supine Transversus Abdominis Bracing - Hands on Stomach - 10-20 x daily - 7 x weekly - 1 sets - 10 reps - 5 hold  • Clamshell - 1 x daily - 7 x weekly - 3 sets - 10 reps - 5 hold  • Small Range Straight Leg Raise - 1 x daily - 7 x weekly - 2 sets - 15 reps - 3 hold  • Right Standing Lateral Shift Correction at [place] - 3-4 x daily - 7 x weekly - 2 sets - 20 reps - 2 hold  • Side Stepping with Resistance at Feet - 1 x daily - 7 x weekly - 3 sets - 20 reps  • Standing Lumbar Extension with Counter - 1-5 x daily - 7 x weekly - 1 sets - 20 reps      FOTO   = 53/66   = 75/66    STarT Back screening score = 5/9; subscore (Q5-9) = 3  3 or < = low risk (1-2 visits)  4 or >, subscore 3 or < = mod risk (</= 8 visits)  4 or >, subscore 4 or > = high risk (</=18 visits)      Manuals 1/26 1/30 2/2 2/6 2/9 2/13 2/16 2/20 2/23 2/27            RE - DS                                           Neuro Re-Ed             TA iso 5"x10 5"x10 5"x15 5"x20         TA with clamshell 5"x10 ea [place] HH 3x5 ea 5" hold           TA with march  1x10 ea           TA with SLR   x15 ea 2x15 ea HEP         Pball march  x20 ea           Dying bug   2xF 3x10         Seated on pball KB pallof press       3# 1" hold x30 4# 1" hold x30 4# 5"x6 4# 5"x6   paloff press  seated on pball GTB x15 ea    On pball with 1 leg off ground GTB x15 ea On pball with 1 leg off ground GTB x15 ea On pball with 1 leg off ground BTB x15 ea On pball with 1 leg off ground GTB x15 ea On pball with 1 leg off ground GTB x15 ea   bird dogs     x15 ea With red band x15 ea With red band x15 ea np With red band x15 ea With red band x15 ea   bridge with hip abd band     Red HH 3x10 Red HH 3x10 Blue HH 3x10 On bosu blue HH 3x10 On bosu blue HH 3x10 On bosu blue HH 3x10   TA with tband ext     Blue 3x10 With SLS GTB x15 ea LE On pball blue 2x10 On pball blue 2x10 On pball GTB 2x10 5" hold On pball GTB 2x10 5" hold   Seated TA on pball with plate weight "drive the bus"      5# 30"x3 hold      Hip drivers, kneeling on sand dune      [place] Jband 2x15       High plank with LE lift  x10 ea    Alt UE/LE 2x10 ea       High plank with reach             redcord multif  iso holds, PT UE resistance, alt  elbow bends     multif 3xF ea multif 3xF ea multif 3xF ea multif 3xF ea   Dead lift with KB       10# 2x15 15# 2x15 10# 2x15 10# 2x15   hooklying alt PT man resist at outstretched hands and knees   3x5 ea          qped multif lifts @ low mat   x15 ea x15 ea 3# ankle weight at distal thigh         Mod side plank with hip abd   3x5 bilat          Lat shift correction    2x20 HEP 2x20     2x20       Ther Ex             Resisted side step    GTB x2min HEP Ext with fulcrum     x20        Sitting with lumbar support     Demo/edu                                                                         Ther Activity                                       Gait Training                                       Modalities             MHP neck post tx  8'

## 2023-03-01 ENCOUNTER — OFFICE VISIT (OUTPATIENT)
Dept: OBGYN CLINIC | Facility: CLINIC | Age: 68
End: 2023-03-01

## 2023-03-01 ENCOUNTER — TELEPHONE (OUTPATIENT)
Dept: OBGYN CLINIC | Facility: CLINIC | Age: 68
End: 2023-03-01

## 2023-03-01 ENCOUNTER — TELEPHONE (OUTPATIENT)
Dept: OBGYN CLINIC | Facility: HOSPITAL | Age: 68
End: 2023-03-01

## 2023-03-01 ENCOUNTER — APPOINTMENT (OUTPATIENT)
Dept: RADIOLOGY | Facility: AMBULARY SURGERY CENTER | Age: 68
End: 2023-03-01
Attending: ORTHOPAEDIC SURGERY

## 2023-03-01 VITALS
HEIGHT: 67 IN | DIASTOLIC BLOOD PRESSURE: 82 MMHG | HEART RATE: 71 BPM | BODY MASS INDEX: 25.11 KG/M2 | SYSTOLIC BLOOD PRESSURE: 161 MMHG | WEIGHT: 160 LBS

## 2023-03-01 DIAGNOSIS — Z96.659 HISTORY OF PARTIAL KNEE REPLACEMENT: ICD-10-CM

## 2023-03-01 DIAGNOSIS — M25.562 LEFT KNEE PAIN, UNSPECIFIED CHRONICITY: ICD-10-CM

## 2023-03-01 DIAGNOSIS — M17.12 PATELLOFEMORAL ARTHRITIS OF LEFT KNEE: Primary | ICD-10-CM

## 2023-03-01 NOTE — TELEPHONE ENCOUNTER
Caller: Patient     Doctor/Office: Dr Guido     Call regarding :  Calling for clarification on ibuprofen and how to take it      Call was transferred to: Triage nurse

## 2023-03-01 NOTE — TELEPHONE ENCOUNTER
Reviewed ov note  Pt called for clarification of med offered today at office  Pt is going to try aleve per label instruction and Dr Oumou Rivera recommendation  Can take Tylenol also and max dose 3000mg in 24 period per label instructions  Advised to take aleve with food and not on any empty stomach  If she does not get relief, she will call back and ask for the medrol yeny that he had also suggested

## 2023-03-01 NOTE — PROGRESS NOTES
Assessment:  1  Patellofemoral arthritis of left knee  XR knee 3 vw left non injury      2  History of partial knee replacement          Patient Active Problem List   Diagnosis   • Psoriasis   • Psoriatic arthritis ([address] )   • Hypertension, essential, benign   • Mitral valve prolapse   • Irritable bowel syndrome   • Psychophysiological insomnia   • Allergic rhinitis   • Bilateral bunions   • Dense breast tissue   • Family history of kidney cancer   • Family history of colon cancer   • Family history of COPD (chronic obstructive pulmonary disease)   • Family history of heart attack   • Family history of macular degeneration   • History of partial knee replacement   • Pure hypercholesterolemia           Plan      74 y o  female with acute left knee pain  Upon review of the left knee x-ray, a thorough history and my examination, Hollie Doyle is presenting with signs and symptoms consistent with patellofemoral arthritis  Diagnosis and treatment options discussed, and all of the patients questions were addressed  A medrol dose pack was offered, patient declined  Suggest taking aleve for pain management  Subjective:     Patient ID:    Chief Complaint:Rosalind Galeas 74 y o  female      HPI    Patient presents today for initial evaluation of left knee pain  History of partial left knee replacement 2016  Pain for 24 hours in anterior medial knee with no mechanism of injury or trauma  Pain is aching and constant in nature, aggravated with weight bearing  She has been using a cane for the past 24 hours, taking tylenol, aleve, and ice with mild symptomatic relief  She denies any numbness or paresthesias         The following portions of the patient's history were reviewed and updated as appropriate: allergies, current medications, past family history, past social history, past surgical history and problem list         Social History     Socioeconomic History   • Marital status:      Spouse name: Not on file   • Number of children: Not on file   • Years of education: Not on file   • Highest education level: Not on file   Occupational History   • Occupation: retired   Tobacco Use   • Smoking status: Never   • Smokeless tobacco: Never   Vaping Use   • Vaping Use: Never used   Substance and Sexual Activity   • Alcohol use: Yes     Alcohol/week: 1 0 standard drink     Types: 1 Glasses of wine per week     Comment: With dinner   • Drug use: Not Currently     Comment: Many years ago   • Sexual activity: Not Currently     Partners: Male     Birth control/protection: Post-menopausal   Other Topics Concern   • Not on file   Social History Narrative   • Not on file     Social Determinants of Health     Financial Resource Strain: Low Risk    • Difficulty of Paying Living Expenses: Not hard at all   Food Insecurity: Not on file   Transportation Needs: No Transportation Needs   • Lack of Transportation (Medical): No   • Lack of Transportation (Non-Medical):  No   Physical Activity: Not on file   Stress: Not on file   Social Connections: Not on file   Intimate Partner Violence: Not on file   Housing Stability: Not on file     Past Medical History:   Diagnosis Date   • Heart valve disease     Mitro valve prolapse   • Hypertension    • Psoriasis    • Psoriatic arthritis ([facility] )    • Varicella      Past Surgical History:   Procedure Laterality Date   • JOINT REPLACEMENT Left 2016   • LEFT OOPHORECTOMY Left 2007    benign cyst   • OOPHORECTOMY     • REPLACEMENT TOTAL KNEE Left      Allergies   Allergen Reactions   • Albumen, Egg - Food Allergy Other (See Comments)   • Casein - Food Allergy Other (See Comments)   • Gluten Meal - Food Allergy Other (See Comments)   • Milk-Related Compounds - Food Allergy Other (See Comments)   • Sulfa Antibiotics Hives     Current Outpatient Medications on File Prior to Visit   Medication Sig Dispense Refill   • Calcium-Vitamin D-Vitamin K (Calcium + D) 500-1000-40 MG-UNT-MCG CHEW Chew 1 tablet in the morning     • Cholecalciferol (Vitamin D3) 50 MCG (2000 UT) capsule  (Patient not taking: Reported on 1/19/2023)     • cyanocobalamin (VITAMIN B-12) 100 MCG tablet Take 100 mcg by mouth daily     • dicyclomine (BENTYL) 10 mg capsule TAKE 1 CAPSULE BY MOUTH 3 TIMES A DAY BEFORE MEALS 270 capsule 1   • ergocalciferol (VITAMIN D2) 50,000 units Take 1 capsule (50,000 Units total) by mouth once a week 12 capsule 0   • fluticasone (FLONASE) 50 mcg/act nasal spray SPRAY 2 SPRAYS INTO EACH NOSTRIL EVERY DAY (Patient taking differently: if needed) 48 mL 1   • guselkumab (TREMFYA) subcutaneous injection Inject 100 mg under the skin every 2 (two) months Medication on hold until insurance clearance     • Omega-3 Fatty Acids (fish oil) 1,000 mg 1,000 mg daily     • omeprazole (PriLOSEC) 20 mg delayed release capsule Take 1 capsule (20 mg total) by mouth every morning 90 capsule 6   • psyllium (METAMUCIL) 58 6 % packet Take 1 packet by mouth 2 (two) times a day     • Tiadylt  MG 24 hr capsule Take 120 mg by mouth daily     • traZODone (DESYREL) 50 mg tablet Take 0 5 tablets (25 mg total) by mouth daily at bedtime as needed for sleep 90 tablet 1   • Turmeric 400 MG CAPS Take by mouth 2 (two) times a day     • vitamin E, tocopherol, 400 units capsule Take 400 Units by mouth daily       No current facility-administered medications on file prior to visit  Objective:    Review of Systems   Constitutional: Negative for chills and fever  HENT: Negative for ear pain and sore throat  Eyes: Negative for pain and visual disturbance  Respiratory: Negative for cough and shortness of breath  Cardiovascular: Negative for chest pain and palpitations  Gastrointestinal: Negative for abdominal pain and vomiting  Genitourinary: Negative for dysuria and hematuria  Musculoskeletal: Negative for arthralgias and back pain  Skin: Negative for color change and rash  Neurological: Negative for seizures and syncope     All other systems reviewed and are negative  Left Knee Exam     Range of Motion   Extension: 0   Flexion: 120     Tests   Babatunde:  Medial - negative   Varus: negative Valgus: negative    Other   Erythema: absent  Scars: present  Sensation: normal  Pulse: present  Swelling: none  Effusion: no effusion present    Comments:  Previous partial replacement incision             Physical Exam  Vitals and nursing note reviewed  HENT:      Head: Normocephalic  Eyes:      Extraocular Movements: Extraocular movements intact  Cardiovascular:      Rate and Rhythm: Normal rate  Pulses: Normal pulses  Pulmonary:      Effort: Pulmonary effort is normal    Musculoskeletal:         General: Normal range of motion  Cervical back: Normal range of motion  Left knee: No effusion  Instability Tests: Medial Babatunde test negative  Skin:     General: Skin is warm and dry  Neurological:      General: No focal deficit present  Mental Status: She is alert  Psychiatric:         Behavior: Behavior normal          Procedures  No Procedures performed today    I have personally reviewed pertinent films in PACS and my interpretation is left knee x-ray demonstrate partial knee replacement, patellofemoral degenerative changes with joint space narrowing and subchondral sclerosis  Scribe Attestation    I,:  Frederic Goodrich am acting as a scribe while in the presence of the attending physician :       I,:  Maude Alejandre, DO personally performed the services described in this documentation    as scribed in my presence :           Portions of the record may have been created with voice recognition software   Occasional wrong word or "sound a like" substitutions may have occurred due to the inherent limitations of voice recognition software   Read the chart carefully and recognize, using context, where substitutions have occurred

## 2023-03-01 NOTE — PROGRESS NOTES
Assessment:  1  Left knee pain, unspecified chronicity  XR knee 3 vw left non injury        Patient Active Problem List   Diagnosis   • Psoriasis   • Psoriatic arthritis ([address] )   • Hypertension, essential, benign   • Mitral valve prolapse   • Irritable bowel syndrome   • Psychophysiological insomnia   • Allergic rhinitis   • Bilateral bunions   • Dense breast tissue   • Family history of kidney cancer   • Family history of colon cancer   • Family history of COPD (chronic obstructive pulmonary disease)   • Family history of heart attack   • Family history of macular degeneration   • History of partial knee replacement   • Pure hypercholesterolemia           Plan      ***            Subjective:     Patient ID:    Chief Complaint:Rosalind Velez 74 y o  female      HPI    Patient comes in today with regards to ***  The patient reports that the pain is in the *** and has been going on for ***  The pain is rated at{Pain Score 0-10, 0 default:54624} at its best and{Pain Score 0-10, 0 default:48888} at its worst   The pain is described as ***  It is worsened with ***, and is made better with ***  The patient has taken *** for treatment  The following portions of the patient's history were reviewed and updated as appropriate: allergies, current medications, past family history, past social history, past surgical history and problem list         Social History     Socioeconomic History   • Marital status:      Spouse name: Not on file   • Number of children: Not on file   • Years of education: Not on file   • Highest education level: Not on file   Occupational History   • Occupation: retired   Tobacco Use   • Smoking status: Never   • Smokeless tobacco: Never   Vaping Use   • Vaping Use: Never used   Substance and Sexual Activity   • Alcohol use: Yes     Alcohol/week: 1 0 standard drink     Types: 1 Glasses of wine per week     Comment: With dinner   • Drug use: Not Currently     Comment:  Many years ago   • Sexual activity: Not Currently     Partners: Male     Birth control/protection: Post-menopausal   Other Topics Concern   • Not on file   Social History Narrative   • Not on file     Social Determinants of Health     Financial Resource Strain: Low Risk    • Difficulty of Paying Living Expenses: Not hard at all   Food Insecurity: Not on file   Transportation Needs: No Transportation Needs   • Lack of Transportation (Medical): No   • Lack of Transportation (Non-Medical):  No   Physical Activity: Not on file   Stress: Not on file   Social Connections: Not on file   Intimate Partner Violence: Not on file   Housing Stability: Not on file     Past Medical History:   Diagnosis Date   • Heart valve disease     Mitro valve prolapse   • Hypertension    • Psoriasis    • Psoriatic arthritis ([facility] )    • Varicella      Past Surgical History:   Procedure Laterality Date   • JOINT REPLACEMENT Left 2016   • LEFT OOPHORECTOMY Left 2007    benign cyst   • OOPHORECTOMY     • REPLACEMENT TOTAL KNEE Left      Allergies   Allergen Reactions   • Albumen, Egg - Food Allergy Other (See Comments)   • Casein - Food Allergy Other (See Comments)   • Gluten Meal - Food Allergy Other (See Comments)   • Milk-Related Compounds - Food Allergy Other (See Comments)   • Sulfa Antibiotics Hives     Current Outpatient Medications on File Prior to Visit   Medication Sig Dispense Refill   • Calcium-Vitamin D-Vitamin K (Calcium + D) 500-1000-40 MG-UNT-MCG CHEW Chew 1 tablet in the morning     • Cholecalciferol (Vitamin D3) 50 MCG (2000 UT) capsule  (Patient not taking: Reported on 1/19/2023)     • cyanocobalamin (VITAMIN B-12) 100 MCG tablet Take 100 mcg by mouth daily     • dicyclomine (BENTYL) 10 mg capsule TAKE 1 CAPSULE BY MOUTH 3 TIMES A DAY BEFORE MEALS 270 capsule 1   • ergocalciferol (VITAMIN D2) 50,000 units Take 1 capsule (50,000 Units total) by mouth once a week 12 capsule 0   • fluticasone (FLONASE) 50 mcg/act nasal spray SPRAY 2 SPRAYS INTO [facility] NOSTRIL EVERY DAY (Patient taking differently: if needed) 48 mL 1   • guselkumab (TREMFYA) subcutaneous injection Inject 100 mg under the skin every 2 (two) months Medication on hold until insurance clearance     • Omega-3 Fatty Acids (fish oil) 1,000 mg 1,000 mg daily     • omeprazole (PriLOSEC) 20 mg delayed release capsule Take 1 capsule (20 mg total) by mouth every morning 90 capsule 6   • psyllium (METAMUCIL) 58 6 % packet Take 1 packet by mouth 2 (two) times a day     • Tiadylt  MG 24 hr capsule Take 120 mg by mouth daily     • traZODone (DESYREL) 50 mg tablet Take 0 5 tablets (25 mg total) by mouth daily at bedtime as needed for sleep 90 tablet 1   • Turmeric 400 MG CAPS Take by mouth 2 (two) times a day     • vitamin E, tocopherol, 400 units capsule Take 400 Units by mouth daily       No current facility-administered medications on file prior to visit  Objective:    Review of Systems    Ortho Exam    Physical Exam    Procedures  {Was [facility] done:44062::"No Procedures performed today"}    {Imaging Review Statement:[id number]}    Scribe Attestation    I,:   am acting as a scribe while in the presence of the attending physician :       I,:   personally performed the services described in this documentation    as scribed in my presence :           Portions of the record may have been created with voice recognition software   Occasional wrong word or "sound a like" substitutions may have occurred due to the inherent limitations of voice recognition software   Read the chart carefully and recognize, using context, where substitutions have occurred

## 2023-03-02 ENCOUNTER — OFFICE VISIT (OUTPATIENT)
Dept: PHYSICAL THERAPY | Facility: REHABILITATION | Age: 68
End: 2023-03-02

## 2023-03-02 DIAGNOSIS — M54.50 ACUTE RIGHT-SIDED LOW BACK PAIN WITHOUT SCIATICA: Primary | ICD-10-CM

## 2023-03-02 NOTE — PROGRESS NOTES
Daily Note     Today's date: 3/2/2023  Patient name: Chioma Hawley  : 1955  MRN: [id number]  Referring provider: Js Walker, PT  Dx:   Encounter Diagnosis     ICD-10-CM    1  Acute right-sided low back pain without sciatica  M54 52           Start Time: 1618  Stop Time: 1700  Total time in clinic (min): 42 minutes    Subjective: Pt reports she has some swelling in her knee, saw ortho and was told to hold off on squatting and kneeling for now  She is taking Aleve for pain  Objective: See treatment diary below      Assessment: Modified tx due to knee pain, holding on bird dogs  Pt responded well to addition of Pball iso core exercises  Pt will benefit from continued skilled outpatient PT to improve strength, to address therapy goals, to reduce pain, and improve function  Plan: Continue per plan of care  Precautions: HTN, psoriatic arthritis      Access Code: [email or web address]  URL: [email or web address]  Date: 2023  Prepared by: Jonel Coughlin    Exercises  • Supine Transversus Abdominis Bracing - Hands on Stomach - 10-20 x daily - 7 x weekly - 1 sets - 10 reps - 5 hold  • Clamshell - 1 x daily - 7 x weekly - 3 sets - 10 reps - 5 hold  • Small Range Straight Leg Raise - 1 x daily - 7 x weekly - 2 sets - 15 reps - 3 hold  • Right Standing Lateral Shift Correction at [place] - 3-4 x daily - 7 x weekly - 2 sets - 20 reps - 2 hold  • Side Stepping with Resistance at Feet - 1 x daily - 7 x weekly - 3 sets - 20 reps  • Standing Lumbar Extension with Counter - 1-5 x daily - 7 x weekly - 1 sets - 20 reps          Manuals 3/2    2/9 2/13 2/16 2/20 2/23 2/27            RE - DS                                           Neuro Re-Ed             Seated on pball KB pallof press 4# 5"x6      3# 1" hold x30 4# 1" hold x30 4# 5"x6 4# 5"x6   paloff press On pball with 1 leg off ground BTB x15 ea     On pball with 1 leg off ground GTB x15 ea On pball with 1 leg off ground GTB x15 ea On pball with 1 leg off ground BTB x15 ea On pball with 1 leg off ground GTB x15 ea On pball with 1 leg off ground GTB x15 ea   bird dogs hold    x15 ea With red band x15 ea With red band x15 ea np With red band x15 ea With red band x15 ea   bridge with hip abd band On bosu blue HH 3x10    Red HH 3x10 Red HH 3x10 Blue HH 3x10 On bosu blue HH 3x10 On bosu blue HH 3x10 On bosu blue HH 3x10   TA with tband ext On pball GTB 2x10 5" hold    Blue 3x10 With SLS GTB x15 ea LE On pball blue 2x10 On pball blue 2x10 On pball GTB 2x10 5" hold On pball GTB 2x10 5" hold   Seated TA on pball with plate weight "drive the bus"      5# 30"x3 hold      Hip drivers, kneeling on sand dune      [place] Jband 2x15       High plank with LE lift      Alt UE/LE 2x10 ea       redcord multif multif 3xF ea      multif 3xF ea multif 3xF ea multif 3xF ea multif 3xF ea   Dead lift with KB 10# 2x15      10# 2x15 15# 2x15 10# 2x15 10# 2x15   UL arm Pball press into wall with TA iso 5"x15 ea            Pball rotation  iso push at wall 5"x10 bilat                                                   Lat shift correction     2x20     2x20       Ther Ex             Ext with fulcrum     x20        Sitting with lumbar support     Demo/edu                                                                         Ther Activity                                       Gait Training                                       Modalities             MHP neck post tx  8'

## 2023-03-06 ENCOUNTER — OFFICE VISIT (OUTPATIENT)
Dept: PHYSICAL THERAPY | Facility: REHABILITATION | Age: 68
End: 2023-03-06

## 2023-03-06 DIAGNOSIS — M54.50 ACUTE RIGHT-SIDED LOW BACK PAIN WITHOUT SCIATICA: Primary | ICD-10-CM

## 2023-03-06 NOTE — PROGRESS NOTES
Daily Note     Today's date: 3/6/2023  Patient name: Destiny Smith  : 1955  MRN: [id number]  Referring provider: Viktoria Asher, PT  Dx:   Encounter Diagnosis     ICD-10-CM    1  Acute right-sided low back pain without sciatica  M54 52           Start Time: 1449  Stop Time: 1530  Total time in clinic (min): 41 minutes    Subjective: Pt reports she has been feeling less pain but has also been taking Aleve so hard to tell  States today is the first day she has been able to go without the knee brace since last week  Objective: See treatment diary below      Assessment: Pt demonstrates improved control and less fatigue during multif on the redcord and also has less "popping" in the lumbar spine  Pt will benefit from continued skilled outpatient PT to improve strength, to address therapy goals, to reduce pain, and improve function  Plan: Continue per plan of care  Precautions: HTN, psoriatic arthritis      Access Code: [email or web address]  URL: [email or web address]  Date: 2023  Prepared by: James Rubio    Exercises  • Supine Transversus Abdominis Bracing - Hands on Stomach - 10-20 x daily - 7 x weekly - 1 sets - 10 reps - 5 hold  • Clamshell - 1 x daily - 7 x weekly - 3 sets - 10 reps - 5 hold  • Small Range Straight Leg Raise - 1 x daily - 7 x weekly - 2 sets - 15 reps - 3 hold  • Right Standing Lateral Shift Correction at [place] - 3-4 x daily - 7 x weekly - 2 sets - 20 reps - 2 hold  • Side Stepping with Resistance at Feet - 1 x daily - 7 x weekly - 3 sets - 20 reps  • Standing Lumbar Extension with Counter - 1-5 x daily - 7 x weekly - 1 sets - 20 reps          Manuals 3/ 3            RE - DS                                           Neuro Re-Ed             Seated on pball KB pallof press 4# 5"x6 4# 6"x5     3# 1" hold x30 4# 1" hold x30 4# 5"x6 4# 5"x6   paloff press On pball with 1 leg off ground BTB x15 ea On pball with 1 leg off ground BTB x15 ea     On pball with 1 leg off ground GTB x15 ea On pball with 1 leg off ground BTB x15 ea On pball with 1 leg off ground GTB x15 ea On pball with 1 leg off ground GTB x15 ea   bird dogs hold      With red band x15 ea np With red band x15 ea With red band x15 ea   bridge with hip abd band On bosu blue HH 3x10 S/l bridge  3x10 ea     Blue HH 3x10 On bosu blue HH 3x10 On bosu blue HH 3x10 On bosu blue HH 3x10   TA with tband ext On pball GTB 2x10 5" hold On pball GTB 2x10 5" hold     On pball blue 2x10 On pball blue 2x10 On pball GTB 2x10 5" hold On pball GTB 2x10 5" hold   Seated TA on pball with plate weight "drive the bus"       hold      Hip drivers, kneeling on sand dune             High plank with LE lift             redcord multif multif 3xF ea multif 3xF ea     multif 3xF ea multif 3xF ea multif 3xF ea multif 3xF ea   Dead lift with KB 10# 2x15 10# 2x15     10# 2x15 15# 2x15 10# 2x15 10# 2x15   UL arm Pball press into wall with TA iso 5"x15 ea 5"x15 ea           Pball rotation  iso push at wall 5"x10 bilat 5"x10 bilat                                                  Ther Ex                                                                                                        Ther Activity                                       Gait Training                                       Modalities

## 2023-03-09 ENCOUNTER — OFFICE VISIT (OUTPATIENT)
Dept: PHYSICAL THERAPY | Facility: REHABILITATION | Age: 68
End: 2023-03-09

## 2023-03-09 DIAGNOSIS — M54.50 ACUTE RIGHT-SIDED LOW BACK PAIN WITHOUT SCIATICA: Primary | ICD-10-CM

## 2023-03-09 NOTE — PROGRESS NOTES
Daily Note     Today's date: 3/9/2023  Patient name: Adrian Hinds  : 1955  MRN: [id number]  Referring provider: Yasmany Russell, PT  Dx:   Encounter Diagnosis     ICD-10-CM    1  Acute right-sided low back pain without sciatica  M54 52           Start Time: 171  Stop Time: 1804  Total time in clinic (min): 46 minutes    Subjective: Pt reports she stopped taking the meds for her knee and she started noticing the low back pain again  States she was walking outside today, going uphill and really noticed the pain coming on, but tried to correct her posture by standing more upright and this relieved the pain  Objective: See treatment diary below      Assessment: Pt demonstrates improved control with redcord multif  She completed drive the bus without pain today, an exercise that was previously painful  Pt will benefit from continued skilled outpatient PT to improve strength, to address therapy goals, to reduce pain, and improve function  Plan: Continue per plan of care  Precautions: HTN, psoriatic arthritis      Access Code: [email or web address]  URL: [email or web address]  Date: 2023  Prepared by: Santi Anton    Exercises  • Supine Transversus Abdominis Bracing - Hands on Stomach - 10-20 x daily - 7 x weekly - 1 sets - 10 reps - 5 hold  • Clamshell - 1 x daily - 7 x weekly - 3 sets - 10 reps - 5 hold  • Small Range Straight Leg Raise - 1 x daily - 7 x weekly - 2 sets - 15 reps - 3 hold  • Right Standing Lateral Shift Correction at [place] - 3-4 x daily - 7 x weekly - 2 sets - 20 reps - 2 hold  • Side Stepping with Resistance at Feet - 1 x daily - 7 x weekly - 3 sets - 20 reps  • Standing Lumbar Extension with Counter - 1-5 x daily - 7 x weekly - 1 sets - 20 reps          Manuals 3/2 3/6 3/9    2/16 2/20 2/23 2/27            RE - DS                                           Neuro Re-Ed             Seated on pball KB pallof press 4# 5"x6 4# 6"x5 4# 10"x4    3# 1" hold x30 4# 1" hold x30 4# 5"x6 4# 5"x6   paloff press On pball with 1 leg off ground BTB x15 ea On pball with 1 leg off ground BTB x15 ea On pball with 1 leg off ground BTB x15 ea    On pball with 1 leg off ground GTB x15 ea On pball with 1 leg off ground BTB x15 ea On pball with 1 leg off ground GTB x15 ea On pball with 1 leg off ground GTB x15 ea   bird dogs hold      With red band x15 ea np With red band x15 ea With red band x15 ea   bridge with hip abd band On bosu blue HH 3x10 S/l bridge  3x10 ea S/l bridge  3x10 ea    Blue HH 3x10 On bosu blue HH 3x10 On bosu blue HH 3x10 On bosu blue HH 3x10   TA with tband ext On pball GTB 2x10 5" hold On pball GTB 2x10 5" hold On pball BTB 2x10 5" hold    On pball blue 2x10 On pball blue 2x10 On pball GTB 2x10 5" hold On pball GTB 2x10 5" hold   Seated TA on pball with plate weight "drive the bus"   5# plate wt 56"L6    hold      Hip drivers, kneeling on sand dune             High plank with LE lift             redcord multif multif 3xF ea multif 3xF ea multif 3xF ea    multif 3xF ea multif 3xF ea multif 3xF ea multif 3xF ea   Dead lift with KB 10# 2x15 10# 2x15 10# 2x15    10# 2x15 15# 2x15 10# 2x15 10# 2x15   UL arm Pball press into wall with TA iso 5"x15 ea 5"x15 ea           Pball rotation  iso push at wall 5"x10 bilat 5"x10 bilat 5"x15 bilat                                                 Ther Ex                                                                                                        Ther Activity                                       Gait Training                                       Modalities

## 2023-03-13 ENCOUNTER — OFFICE VISIT (OUTPATIENT)
Dept: PHYSICAL THERAPY | Facility: REHABILITATION | Age: 68
End: 2023-03-13

## 2023-03-13 DIAGNOSIS — M54.50 ACUTE RIGHT-SIDED LOW BACK PAIN WITHOUT SCIATICA: Primary | ICD-10-CM

## 2023-03-13 NOTE — PROGRESS NOTES
Daily Note     Today's date: 3/13/2023  Patient name: Gurjit Melo  : 1955  MRN: [id number]  Referring provider: Zuleima Guerra PT  Dx:   Encounter Diagnosis     ICD-10-CM    1  Acute right-sided low back pain without sciatica  M54 52           Start Time: 1750  Stop Time: 183  Total time in clinic (min): 42 minutes    Subjective: Pt reports she feels pretty good today  Had no pain after last session with progressions in exercises  Objective: See treatment diary below      Assessment: Pt fatigued with the exercises  Cue given during prone hip ext at edge of mat to ext the knee and DF the ankle to prevent hamstring cramping and cues given to avoid lumbar hyperextension  She continues to demonstrate improvements in core strength with less pelvic rotation during record multif UE lifts and Pball TA exercises  Pt will benefit from continued skilled outpatient PT to improve strength, to address therapy goals, to reduce pain, and improve function  Plan: Continue per plan of care  Precautions: HTN, psoriatic arthritis      Access Code: [email or web address]  URL: [email or web address]  Date: 2023  Prepared by: Farideh Abel    Exercises  • Supine Transversus Abdominis Bracing - Hands on Stomach - 10-20 x daily - 7 x weekly - 1 sets - 10 reps - 5 hold  • Clamshell - 1 x daily - 7 x weekly - 3 sets - 10 reps - 5 hold  • Small Range Straight Leg Raise - 1 x daily - 7 x weekly - 2 sets - 15 reps - 3 hold  • Right Standing Lateral Shift Correction at [place] - 3-4 x daily - 7 x weekly - 2 sets - 20 reps - 2 hold  • Side Stepping with Resistance at Feet - 1 x daily - 7 x weekly - 3 sets - 20 reps  • Standing Lumbar Extension with Counter - 1-5 x daily - 7 x weekly - 1 sets - 20 reps          Manuals 3/2 3/6 3/9 3/13                                                             Neuro Re-Ed             Seated on pball KB pallof press 4# 5"x6 4# 6"x5 4# 10"x4 4# 15"x3         paloff press On pball with 1 leg off ground BTB x15 ea On pball with 1 leg off ground BTB x15 ea On pball with 1 leg off ground BTB x15 ea On pball with 1 leg off ground BTB x15 ea         bird dogs hold            bridge with hip abd band On bosu blue HH 3x10 S/l bridge  3x10 ea S/l bridge  3x10 ea S/l bridge  3x10 ea         TA with tband ext On pball GTB 2x10 5" hold On pball GTB 2x10 5" hold On pball BTB 2x10 5" hold On pball BTB x15 5" hold         Seated TA on pball with plate weight "drive the bus"   5# plate wt 07"N3 5# plate wt 73"P2         redcord multif multif 3xF ea multif 3xF ea multif 3xF ea multif 2xF ea         Dead lift with KB 10# 2x15 10# 2x15 10# 2x15 10# 2x15         UL arm Pball press into wall with TA iso 5"x15 ea 5"x15 ea           Pball rotation  iso push at wall 5"x10 bilat 5"x10 bilat 5"x15 bilat          Hip ext at EOM    2x10 B                                   Ther Ex                                                                                                        Ther Activity                                       Gait Training                                       Modalities

## 2023-03-16 ENCOUNTER — OFFICE VISIT (OUTPATIENT)
Dept: PHYSICAL THERAPY | Facility: REHABILITATION | Age: 68
End: 2023-03-16

## 2023-03-16 DIAGNOSIS — M54.50 ACUTE RIGHT-SIDED LOW BACK PAIN WITHOUT SCIATICA: Primary | ICD-10-CM

## 2023-03-16 NOTE — PROGRESS NOTES
Daily Note     Today's date: 3/16/2023  Patient name: Bibi Sanford  : 1955  MRN: [id number]  Referring provider: Melita Shankar, PT  Dx:   Encounter Diagnosis     ICD-10-CM    1  Acute right-sided low back pain without sciatica  M54 52           Start Time: 1700  Stop Time: 1750  Total time in clinic (min): 50 minutes    Subjective: Pt reports she has had pain across her low back when sitting too long and has trouble standing up straight right away  Still using her lumbar support  Felt fine after last visit with progressions  Objective: See treatment diary below      Assessment: Pt fatigued with progression in s/l bridge to pball and addition of standing wall clamshell  She completed all exercises without pain  Attempted to progress redcord multif to longer cords, but pt unable to maintain neutral pelvis therefore cords were raised back up  Pt will benefit from continued skilled outpatient PT to improve strength, to address therapy goals, to reduce pain, and improve function  Plan: Continue per plan of care  Precautions: HTN, psoriatic arthritis      Access Code: [email or web address]  URL: [email or web address]  Date: 2023  Prepared by: Duncan Pichardo    Exercises  • Supine Transversus Abdominis Bracing - Hands on Stomach - 10-20 x daily - 7 x weekly - 1 sets - 10 reps - 5 hold  • Clamshell - 1 x daily - 7 x weekly - 3 sets - 10 reps - 5 hold  • Small Range Straight Leg Raise - 1 x daily - 7 x weekly - 2 sets - 15 reps - 3 hold  • Right Standing Lateral Shift Correction at [place] - 3-4 x daily - 7 x weekly - 2 sets - 20 reps - 2 hold  • Side Stepping with Resistance at Feet - 1 x daily - 7 x weekly - 3 sets - 20 reps  • Standing Lumbar Extension with Counter - 1-5 x daily - 7 x weekly - 1 sets - 20 reps          Manuals 3/2 3/6 3/9 3/13 3/16                                                            Neuro Re-Ed             Seated on pball KB pallof press 4# 5"x6 4# 6"x5 4# 10"x4 4# 15"x3 4# 15"x5        paloff press On pball with 1 leg off ground BTB x15 ea On pball with 1 leg off ground BTB x15 ea On pball with 1 leg off ground BTB x15 ea On pball with 1 leg off ground BTB x15 ea On pball with 1 leg off ground BTB x15 ea        bird dogs hold            bridge with hip abd band On bosu blue HH 3x10 S/l bridge  3x10 ea S/l bridge  3x10 ea S/l bridge  3x10 ea S/l bridge on pball 3x10 ea        TA with tband ext On pball GTB 2x10 5" hold On pball GTB 2x10 5" hold On pball BTB 2x10 5" hold On pball BTB x15 5" hold On pball BTB x15 5" hold        Seated TA on pball with plate weight "drive the bus"   5# plate wt 83"H1 5# plate wt 10"J2 5# plate wt 70"B88        redcord multif multif 3xF ea multif 3xF ea multif 3xF ea multif 2xF ea multif 2xF ea        Dead lift with KB 10# 2x15 10# 2x15 10# 2x15 10# 2x15 12# 2x15        UL arm Pball press into wall with TA iso 5"x15 ea 5"x15 ea           Pball rotation  iso push at wall 5"x10 bilat 5"x10 bilat 5"x15 bilat          Hip ext at EOM    2x10 B         Standing Wall clamshells     [place] HH 2x10 ea                     Ther Ex                                                                                                        Ther Activity                                       Gait Training                                       Modalities

## 2023-03-20 ENCOUNTER — APPOINTMENT (OUTPATIENT)
Dept: PHYSICAL THERAPY | Facility: REHABILITATION | Age: 68
End: 2023-03-20

## 2023-03-22 ENCOUNTER — OFFICE VISIT (OUTPATIENT)
Dept: FAMILY MEDICINE CLINIC | Facility: CLINIC | Age: 68
End: 2023-03-22

## 2023-03-22 VITALS
OXYGEN SATURATION: 97 % | BODY MASS INDEX: 23.76 KG/M2 | DIASTOLIC BLOOD PRESSURE: 92 MMHG | RESPIRATION RATE: 16 BRPM | HEIGHT: 67 IN | HEART RATE: 79 BPM | SYSTOLIC BLOOD PRESSURE: 142 MMHG | TEMPERATURE: 98.2 F | WEIGHT: 151.4 LBS

## 2023-03-22 DIAGNOSIS — L40.50 PSORIATIC ARTHRITIS (HCC): Primary | ICD-10-CM

## 2023-03-22 DIAGNOSIS — I10 HYPERTENSION, ESSENTIAL, BENIGN: ICD-10-CM

## 2023-03-22 DIAGNOSIS — E55.9 VITAMIN D DEFICIENCY: ICD-10-CM

## 2023-03-22 DIAGNOSIS — Z96.659 HISTORY OF PARTIAL KNEE REPLACEMENT: ICD-10-CM

## 2023-03-22 DIAGNOSIS — G25.81 RESTLESS LEG SYNDROME: ICD-10-CM

## 2023-03-22 DIAGNOSIS — F51.04 PSYCHOPHYSIOLOGICAL INSOMNIA: ICD-10-CM

## 2023-03-22 DIAGNOSIS — E78.00 PURE HYPERCHOLESTEROLEMIA: ICD-10-CM

## 2023-03-22 DIAGNOSIS — M85.80 OSTEOPENIA, UNSPECIFIED LOCATION: ICD-10-CM

## 2023-03-22 NOTE — PATIENT INSTRUCTIONS
Value of vitamin E is questionable, you may discontinue  Please ask your pharmacist to send future refills of diltiazem ER to either cardiology or myself  We will proceed with blood work to reassess your vitamin D  Please consider consultation with [facility]ology at [facility]  Please increase dose of trazodone to 1 tablet at bedtime, if needed dose could be safely increased up to 2 tablets at bedtime  If this medication is not effective in a long-term-we will consider an alternative ( BuSpar)  Please keep follow up  with Jazzy Robins  Please repeat blood work anytime after April 11, we will retest lipid panel, TSH and vitamin D  Please complete prescription strength vitamin D and go back on over-the-counter dose of vitamin D3 2,000 units daily  Please consider trial of magnesium oxide 500 mg at bedtime for restless leg syndrome  If your back pain is not improving after 4 to 6 weeks of chiropractic care or physical therapy-please let me know and we should proceed with x-ray at that time  146/90 blood pressure today  Please start monitoring blood pressures at home  Anytime without emotional or physical exertion  You may check it once a day or every other day    Please send me an update of blood pressure and heart rate in 2 to 3 weeks

## 2023-03-22 NOTE — ASSESSMENT & PLAN NOTE
Uses trazodone 50 mg - 1/2 tab qhs PRN  Patient may increase dose of trazodone up to 50 in the 100 mg nightly if needed  She has been under significant stress due to arthritic pain and taking care of her elderly mother  Patient will contact me for refills if needed

## 2023-03-22 NOTE — PROGRESS NOTES
Name: Devyn Metz      : 1955      MRN: [id number]  Encounter Provider: Nella Vaughan MD  Encounter Date: 3/22/2023   Encounter department: [address] [place] [address]     1  Psoriatic arthritis [facility])  Assessment & Plan:  On TREMFYA for treatment of psoriasis as per dermatology  Patient should proceed with further evaluation by rheumatology  Orders:  -     Ambulatory referral to Rheumatology; Future    2  Psychophysiological insomnia  Assessment & Plan:  Uses trazodone 50 mg - 1/2 tab qhs PRN  Patient may increase dose of trazodone up to 50 in the 100 mg nightly if needed  She has been under significant stress due to arthritic pain and taking care of her elderly mother  Patient will contact me for refills if needed  3  Pure hypercholesterolemia  Assessment & Plan:  Recent lipid panel indicates elevated cholesterol  Patient follows healthy diet  She had to modify her regular physical activity due to left knee pain and low back pain  Pending labs to retest in April  Orders:  -     Lipid Panel with Direct LDL reflex; Future  -     TSH, 3rd generation; Future    4  Vitamin D deficiency  -     Vitamin D 25 hydroxy; Future    5  Hypertension, essential, benign  Assessment & Plan:  Patient is on diltiazem  mg daily  Pharmacy should contact PCP or cardiologist for future refills  Pressure is slightly elevated  Patient has been under significant stress lately  I wonder if some of her blood pressure elevation represents whitecoat syndrome  She will start monitoring blood pressure at home and will update me with her blood pressure and heart rate readings in a few weeks  6  History of partial knee replacement  Assessment & Plan:  Persistent left knee pain  Pending follow-up with [facility] orthopedic surgery  7  Restless leg syndrome    8  Osteopenia, unspecified location  Assessment & Plan:  DEXA 2023    Continue with calcium rich diet, vitamin D supplements and weightbearing exercise  Reassess in 2 years  Patient Instructions   Value of vitamin E is questionable, you may discontinue  Please ask your pharmacist to send future refills of diltiazem ER to either cardiology or myself  We will proceed with blood work to reassess your vitamin D  Please consider [facility]  Please increase dose of trazodone to 1 tablet at bedtime, if needed dose could be safely increased up to 2 tablets at bedtime  If this medication is not effective in a long-term-we will consider an alternative ( BuSpar)  Please keep follow up  with Suzy Cox  Please repeat blood work anytime after April 11, we will retest lipid panel, TSH and vitamin D  Please complete prescription strength vitamin D and go back on over-the-counter dose of vitamin D3 2,000 units daily  Please consider trial of magnesium oxide 500 mg at bedtime for restless leg syndrome  If your back pain is not improving after 4 to 6 weeks of chiropractic care or physical therapy-please let me know and we should proceed with x-ray at that time  146/90 blood pressure today  Please start monitoring blood pressures at home  Anytime without emotional or physical exertion  You may check it once a day or every other day  Please send me an update of blood pressure and heart rate in 2 to 3 weeks           Subjective     New patient  to our practice  Previous PCP-[facility]'s internal medicine   Patient with known vitamin D deficiency psoriasis and psoriatic arthritis  She is on vitamin D 50,000 units weekly  She will be due for follow-up blood work soon  She has not been using over-the-counter vitamin D lately  Patient is complaining of polyarticular arthralgias      TREMFYA as per Dr Thomas treatment of psoriasis  Pending OV with rheumatologist and looks- 10/2023  Known  Dx of psoriatic arthritis     Low back pain -  Had US recently - normal with x-ray was ordered but patient did not proceed  She denies symptoms of sciatica  Patient is complaining of left knee pain  History of hemiarthroplasty  Dr Guido- saw pt in early March,patient was referred for further evaluation with Dr Everardo Fernandez  XRAY -Prominent degenerative bone spurring at the patella  Medial hemiarthroplasty seems satisfactory  H/o  Hemiarthroplasty left     Unfortunately she is not able to continue with regular physical activity due to back pain and knee pain  Patient usually is very active, enjoys playing [employer] ball  Patient has been under significant stress lately  Mother is on hospice,   Neck insomnia, she has been taking  Trazodone  25- 50 qhs with some improvement  Poor sleep stress  Reviewed results of recent blood work  Significant elevation of cholesterol of 247, previous value was 214  LDL went up from 96-1 36  Patient follows healthy diet but is concerned that cholesterol elevation could be related to lack of physical activity as above  Patient denies family history of CAD  Mother diagnosed with CVA at age of 80  Father- COPD, pacemaker  Mother- CVA - at 80     [address]- GYN   Last mammo  10/2023  Patient is up-to-date with colorectal screening  We reviewed results of recent bone density scan with patient  DEXA is consistent with osteopenia patient will be due for recheck in 2 years  Longstanding history of hypertension  Patient is on diltiazem 120 mg daily  She follows up with [facility] cardiology  No symptoms of palpitations, chest pain, shortness of breath or dizziness                    Review of Systems   Constitutional: Negative  HENT: Negative  Eyes: Negative  Respiratory: Negative  Cardiovascular: Negative  Gastrointestinal: Negative  Endocrine: Negative  Genitourinary: Negative  Musculoskeletal: Positive for arthralgias and back pain     Skin:        As per HPI, history of psoriasis   Allergic/Immunologic: Negative  Neurological: Negative  Hematological: Negative  Psychiatric/Behavioral: Positive for sleep disturbance  The patient is nervous/anxious           Under stress       Past Medical History:   Diagnosis Date   • Anxiety    • Arthritis    • Heart valve disease     Mitro valve prolapse   • Hypertension    • Psoriasis    • Psoriatic arthritis ([facility] [address] )    • Varicella      Past Surgical History:   Procedure Laterality Date   • JOINT REPLACEMENT Left 2016   • LEFT OOPHORECTOMY Left 2007    benign cyst   • OOPHORECTOMY     • REPLACEMENT TOTAL KNEE Left      Family History   Problem Relation Age of Onset   • Stroke Mother    • Supraventricular tachycardia Mother    • Psoriasis Mother    • Psoriatic Arthritis Mother    • Hypertension Mother         Mother and father   • Dementia Mother         Mother   • Squamous cell carcinoma Mother         skin- unknown age   • Hypertension Father    • Heart disease Father    • Kidney cancer Sister         19's   • Cancer Sister         Kidney removed due to cancer   • No Known Problems Sister    • No Known Problems Daughter    • Colon cancer Maternal Grandmother    • Cancer Maternal Grandmother         Colon cancer   • No Known Problems Maternal Grandfather    • No Known Problems Paternal Grandmother    • Prostate cancer Paternal Grandfather         80's   • No Known Problems Maternal Aunt    • No Known Problems Maternal Aunt      Social History     Socioeconomic History   • Marital status:      Spouse name: None   • Number of children: None   • Years of education: None   • Highest education level: None   Occupational History   • Occupation: retired   Tobacco Use   • Smoking status: Never   • Smokeless tobacco: Never   Vaping Use   • Vaping Use: Never used   Substance and Sexual Activity   • Alcohol use: Not Currently     Alcohol/week: 1 0 standard drink     Types: 1 Glasses of wine per week     Comment: With dinner   • Drug use: Not Currently     Types: Marijuana Comment: Many years ago   • Sexual activity: Not Currently     Partners: Male     Birth control/protection: Post-menopausal     Comment: Not active in a long time  No partners, no interest   Other Topics Concern   • None   Social History Narrative   • None     Social Determinants of Health     Financial Resource Strain: Low Risk    • Difficulty of Paying Living Expenses: Not hard at all   Food Insecurity: Not on file   Transportation Needs: No Transportation Needs   • Lack of Transportation (Medical): No   • Lack of Transportation (Non-Medical):  No   Physical Activity: Not on file   Stress: Not on file   Social Connections: Not on file   Intimate Partner Violence: Not on file   Housing Stability: Not on file     Current Outpatient Medications on File Prior to Visit   Medication Sig   • CALCIUM PO Take by mouth   • Cholecalciferol (Vitamin D3) 50 MCG (2000 UT) capsule    • cyanocobalamin (VITAMIN B-12) 100 MCG tablet Take 100 mcg by mouth daily   • dicyclomine (BENTYL) 10 mg capsule TAKE 1 CAPSULE BY MOUTH 3 TIMES A DAY BEFORE MEALS   • fluticasone (FLONASE) 50 mcg/act nasal spray SPRAY 2 SPRAYS INTO EACH NOSTRIL EVERY DAY (Patient taking differently: if needed)   • guselkumab (TREMFYA) subcutaneous injection Inject 100 mg under the skin every 2 (two) months Medication on hold until insurance clearance   • Omega-3 Fatty Acids (fish oil) 1,000 mg 1,000 mg daily   • psyllium (METAMUCIL) 58 6 % packet Take 1 packet by mouth 2 (two) times a day   • traZODone (DESYREL) 50 mg tablet Take 0 5 tablets (25 mg total) by mouth daily at bedtime as needed for sleep   • Turmeric 400 MG CAPS Take by mouth 2 (two) times a day   • vitamin E, tocopherol, 400 units capsule Take 400 Units by mouth daily   • ergocalciferol (VITAMIN D2) 50,000 units Take 1 capsule (50,000 Units total) by mouth once a week     Allergies   Allergen Reactions   • Albumen, Egg - Food Allergy Other (See Comments)   • Casein - Food Allergy Other (See Comments) "  • Gluten Meal - Food Allergy Other (See Comments)   • Milk-Related Compounds - Food Allergy Other (See Comments)   • Sulfa Antibiotics Hives     Immunization History   Administered Date(s) Administered   • COVID-19 J&J ([employer]) vaccine 0 5 mL 03/08/2021, 11/24/2021   • COVID-19 Pfizer vac (Dylan-sucrose, gray cap) 12 yr+ IM 08/29/2022   • INFLUENZA 10/01/2015, 10/01/2018, 10/07/2022   • Influenza LAIV (Nasal) 10/20/2008   • Influenza Quadrivalent 3 years and older 10/23/2021   • Influenza Split 09/20/2011   • Influenza, seasonal, injectable, preservative free 10/17/2016, 09/10/2020   • Pneumococcal Conjugate Vaccine 20-valent (Pcv20), Polysace 01/09/2023   • Pneumococcal Polysaccharide PPV23 04/03/2000, 01/14/2009   • Td (adult), adsorbed 01/01/2002   • Tdap 05/10/2012   • Zoster Vaccine Recombinant 02/27/2020, 06/16/2020       Objective     /92 (BP Location: Left arm, Patient Position: Sitting, Cuff Size: Standard)   Pulse 79   Temp 98 2 °F (36 8 °C) (Temporal)   Resp 16   Ht 5' 6 5\" (1 689 m)   Wt 68 7 kg (151 lb 6 4 oz)   SpO2 97%   BMI 24 07 kg/m²     Physical Exam  Vitals and nursing note reviewed  Constitutional:       General: She is not in acute distress  Appearance: Normal appearance  She is well-developed  She is not ill-appearing  HENT:      Head: Normocephalic and atraumatic  Eyes:      Conjunctiva/sclera: Conjunctivae normal    Neck:      Thyroid: No thyromegaly  Vascular: No carotid bruit  Cardiovascular:      Rate and Rhythm: Normal rate and regular rhythm  Heart sounds: Normal heart sounds  No murmur heard  Pulmonary:      Effort: Pulmonary effort is normal  No respiratory distress  Breath sounds: Normal breath sounds  No wheezing  Abdominal:      General: Bowel sounds are normal  There is no distension or abdominal bruit  Palpations: Abdomen is soft  Tenderness: There is no abdominal tenderness  Hernia: No hernia is present   " Musculoskeletal:         General: Normal range of motion  Cervical back: Neck supple  No rigidity  Right lower leg: No edema  Left lower leg: No edema  Skin:     General: Skin is warm  Neurological:      General: No focal deficit present  Mental Status: She is alert and oriented to person, place, and time  Cranial Nerves: No cranial nerve deficit  Coordination: Coordination normal    Psychiatric:         Mood and Affect: Mood normal          Behavior: Behavior normal          Thought Content:  Thought content normal        Ayaz Kim MD

## 2023-03-22 NOTE — ASSESSMENT & PLAN NOTE
Patient is on diltiazem  mg daily  Pharmacy should contact PCP or cardiologist for future refills  Pressure is slightly elevated  Patient has been under significant stress lately  I wonder if some of her blood pressure elevation represents whitecoat syndrome  She will start monitoring blood pressure at home and will update me with her blood pressure and heart rate readings in a few weeks

## 2023-03-22 NOTE — ASSESSMENT & PLAN NOTE
On TREMFYA for treatment of psoriasis as per dermatology  Patient should proceed with further evaluation by rheumatology

## 2023-03-23 ENCOUNTER — OFFICE VISIT (OUTPATIENT)
Dept: PHYSICAL THERAPY | Facility: REHABILITATION | Age: 68
End: 2023-03-23

## 2023-03-23 DIAGNOSIS — M54.50 ACUTE RIGHT-SIDED LOW BACK PAIN WITHOUT SCIATICA: Primary | ICD-10-CM

## 2023-03-23 NOTE — PROGRESS NOTES
Daily Note     Today's date: 3/23/2023  Patient name: Ramonita Jarvis  : 1955  MRN: [id number]  Referring provider: Jose Rafael Haile PT  Dx:   Encounter Diagnosis     ICD-10-CM    1  Acute right-sided low back pain without sciatica  M54 52                      Subjective: Patient stated moderate soreness in her low back prior to treatment session attributed to extensive driving today  Objective: See treatment diary below      Assessment: Patient performed addition of [place] sidestepping without c/o pain; demonstrated moderate challenge with one leg bridges on BOSU  Plan: Continue per plan of care  Precautions: HTN, psoriatic arthritis      Access Code: [email or web address]  URL: [email or web address]  Date: 2023  Prepared by: Shelia Hanson    Exercises  • Supine Transversus Abdominis Bracing - Hands on Stomach - 10-20 x daily - 7 x weekly - 1 sets - 10 reps - 5 hold  • Clamshell - 1 x daily - 7 x weekly - 3 sets - 10 reps - 5 hold  • Small Range Straight Leg Raise - 1 x daily - 7 x weekly - 2 sets - 15 reps - 3 hold  • Right Standing Lateral Shift Correction at [place] - 3-4 x daily - 7 x weekly - 2 sets - 20 reps - 2 hold  • Side Stepping with Resistance at Feet - 1 x daily - 7 x weekly - 3 sets - 20 reps  • Standing Lumbar Extension with Counter - 1-5 x daily - 7 x weekly - 1 sets - 20 reps          Manuals 3/2 3/6 3/9 3/13 3/16 3/23                                                           Neuro Re-Ed             Seated on pball KB pallof press 4# 5"x6 4# 6"x5 4# 10"x4 4# 15"x3 4# 15"x5 4# 30x5"       paloff press On pball with 1 leg off ground BTB x15 ea On pball with 1 leg off ground BTB x15 ea On pball with 1 leg off ground BTB x15 ea On pball with 1 leg off ground BTB x15 ea On pball with 1 leg off ground BTB x15 ea On BOSU with 1 leg off ground BTB x20 ea       bird dogs hold            bridge with hip abd band On bosu blue HH 3x10 S/l bridge  3x10 ea S/l bridge  3x10 ea S/l bridge  3x10 ea S/l bridge on pball 3x10 ea S/l bridge on pball 3x10 ea       TA with tband ext On pball GTB 2x10 5" hold On pball GTB 2x10 5" hold On pball BTB 2x10 5" hold On pball BTB x15 5" hold On pball BTB x15 5" hold On pball BTB x20 5" hold       Seated TA on pball with plate weight "drive the bus"   5# plate wt 34"B6 5# plate wt 84"B7 5# plate wt 00"E08 5# plate wt 47"M45       redcord multif multif 3xF ea multif 3xF ea multif 3xF ea multif 2xF ea multif 2xF ea multif 2xF ea       Dead lift with KB 10# 2x15 10# 2x15 10# 2x15 10# 2x15 12# 2x15 15# 2x10       UL arm Pball press into wall with TA iso 5"x15 ea 5"x15 ea           Pball rotation  iso push at wall 5"x10 bilat 5"x10 bilat 5"x15 bilat          Hip ext at EOM    2x10 B         Standing Wall clamshells     [place] HH 2x10 ea [place] [facility] 2x10 ea       [place] resisted sidestepping      10# 10x ea         Ther Ex                                                                                                        Ther Activity                                       Gait Training                                       Modalities

## 2023-03-24 DIAGNOSIS — I10 HYPERTENSION, ESSENTIAL, BENIGN: Primary | ICD-10-CM

## 2023-03-24 RX ORDER — DILTIAZEM HYDROCHLORIDE 120 MG/1
120 CAPSULE, EXTENDED RELEASE ORAL DAILY
Qty: 90 CAPSULE | Refills: 3 | Status: SHIPPED | OUTPATIENT
Start: 2023-03-24

## 2023-03-26 PROBLEM — M85.80 OSTEOPENIA: Status: ACTIVE | Noted: 2023-03-26

## 2023-03-26 PROBLEM — G25.81 RESTLESS LEG SYNDROME: Status: ACTIVE | Noted: 2023-03-26

## 2023-03-26 PROBLEM — Z82.49 FAMILY HISTORY OF HEART ATTACK: Status: RESOLVED | Noted: 2022-04-05 | Resolved: 2023-03-26

## 2023-03-26 NOTE — ASSESSMENT & PLAN NOTE
DEXA January 2023  Continue with calcium rich diet, vitamin D supplements and weightbearing exercise  Reassess in 2 years

## 2023-03-26 NOTE — ASSESSMENT & PLAN NOTE
Recent lipid panel indicates elevated cholesterol  Patient follows healthy diet  She had to modify her regular physical activity due to left knee pain and low back pain  Pending labs to retest in April

## 2023-03-26 NOTE — ASSESSMENT & PLAN NOTE
Persistent left knee pain  Pending follow-up with [facility] orthopedic surgery

## 2023-03-27 ENCOUNTER — OFFICE VISIT (OUTPATIENT)
Dept: PHYSICAL THERAPY | Facility: REHABILITATION | Age: 68
End: 2023-03-27

## 2023-03-27 DIAGNOSIS — M54.50 ACUTE RIGHT-SIDED LOW BACK PAIN WITHOUT SCIATICA: Primary | ICD-10-CM

## 2023-03-27 NOTE — PROGRESS NOTES
"Daily Note     Today's date: 3/27/2023  Patient name: Juan Sanches  : 1955  MRN: [id number]  Referring provider: Ric Johnson, PT  Dx:   Encounter Diagnosis     ICD-10-CM    1  Acute right-sided low back pain without sciatica  M54 52           Start Time: 1700  Stop Time: 1750  Total time in clinic (min): 50 minutes    Subjective: Patient reports she still gets pain at times  Saw Dr Will Mattson and is being sent to a rheumatologist for a workup  Objective: See treatment diary below      Assessment: Pt fatigued the most with s/l bridges as well as seated pball \"drive the bus\"  Pt will benefit from continued skilled outpatient PT to improve strength, to address therapy goals, to reduce pain, and improve function  Plan: Continue per plan of care  Precautions: HTN, psoriatic arthritis      Access Code: [email or web address]  URL: [email or web address]  Date: 2023  Prepared by: Carole Olguin    Exercises  • Supine Transversus Abdominis Bracing - Hands on Stomach - 10-20 x daily - 7 x weekly - 1 sets - 10 reps - 5 hold  • Clamshell - 1 x daily - 7 x weekly - 3 sets - 10 reps - 5 hold  • Small Range Straight Leg Raise - 1 x daily - 7 x weekly - 2 sets - 15 reps - 3 hold  • Right Standing Lateral Shift Correction at [place] - 3-4 x daily - 7 x weekly - 2 sets - 20 reps - 2 hold  • Side Stepping with Resistance at Feet - 1 x daily - 7 x weekly - 3 sets - 20 reps  • Standing Lumbar Extension with Counter - 1-5 x daily - 7 x weekly - 1 sets - 20 reps          Manuals 3/2 3/6 3/9 3/13 3/16 3/23 3/27                                                          Neuro Re-Ed             Seated on pball KB pallof press 4# 5\"x6 4# 6\"x5 4# 10\"x4 4# 15\"x3 4# 15\"x5 4# 30x5\" 4# 15\"x5      paloff press On pball with 1 leg off ground BTB x15 ea On pball with 1 leg off ground BTB x15 ea On pball with 1 leg off ground BTB x15 ea On pball with 1 leg off ground BTB x15 ea On pball with 1 leg off " "ground BTB x15 ea On BOSU with 1 leg off ground BTB x20 ea On pball with 1 leg off ground BTB x20 ea      bird dogs hold            bridge with hip abd band On bosu blue HH 3x10 S/l bridge  3x10 ea S/l bridge  3x10 ea S/l bridge  3x10 ea S/l bridge on pball 3x10 ea S/l bridge on pball 3x10 ea S/l bridge on pball 3x10 ea      TA with tband ext On pball GTB 2x10 5\" hold On pball GTB 2x10 5\" hold On pball BTB 2x10 5\" hold On pball BTB x15 5\" hold On pball BTB x15 5\" hold On pball BTB x20 5\" hold On pball BTB x15 5\" hold      Seated TA on pball with plate weight \"drive the bus\"   5# plate wt 22\"E7 5# plate wt 82\"N7 5# plate wt 32\"R60 5# plate wt 82\"Z63 5# plate wt 56\"P25      redcord multif multif 3xF ea multif 3xF ea multif 3xF ea multif 2xF ea multif 2xF ea multif 2xF ea multif 2xF ea      Dead lift with KB 10# 2x15 10# 2x15 10# 2x15 10# 2x15 12# 2x15 15# 2x10 10# 2x15 15#     UL arm Pball press into wall with TA iso 5\"x15 ea 5\"x15 ea           Pball rotation  iso push at wall 5\"x10 bilat 5\"x10 bilat 5\"x15 bilat          Hip ext at EOM    2x10 B         Standing Wall clamshells     [place] HH 2x10 ea [place] [facility] 2x10 ea [place] [facility] 2x10 ea      [place] resisted sidestepping      10# 10x ea         Ther Ex                                                                                                        Ther Activity                                       Gait Training                                       Modalities                                            "

## 2023-03-30 ENCOUNTER — OFFICE VISIT (OUTPATIENT)
Dept: PHYSICAL THERAPY | Facility: REHABILITATION | Age: 68
End: 2023-03-30

## 2023-03-30 DIAGNOSIS — M54.50 ACUTE RIGHT-SIDED LOW BACK PAIN WITHOUT SCIATICA: Primary | ICD-10-CM

## 2023-03-30 NOTE — PROGRESS NOTES
"Daily Note     Today's date: 3/30/2023  Patient name: Ines Tatum  : 1955  MRN: [id number]  Referring provider: Camila Brewster PT  Dx:   Encounter Diagnosis     ICD-10-CM    1  Acute right-sided low back pain without sciatica  M54 52           Start Time: 1700  Stop Time: 1740  Total time in clinic (min): 40 minutes    Subjective: Patient reports she had pain after sitting in the car for an hour  Sitting for too long is still her main limitation,    Objective: See treatment diary below      Assessment: Pt fatigued with s/l bridges  She also fatigued with progression in multif on redcord and dead lifts  However she maintained good form with minimal cuing  Pt will benefit from continued skilled outpatient PT to improve strength, to address therapy goals, to reduce pain, and improve function  Plan: Continue per plan of care  Precautions: HTN, psoriatic arthritis      Access Code: [email or web address]  URL: [email or web address]  Date: 2023  Prepared by: Darren Perrin    Exercises  • Supine Transversus Abdominis Bracing - Hands on Stomach - 10-20 x daily - 7 x weekly - 1 sets - 10 reps - 5 hold  • Clamshell - 1 x daily - 7 x weekly - 3 sets - 10 reps - 5 hold  • Small Range Straight Leg Raise - 1 x daily - 7 x weekly - 2 sets - 15 reps - 3 hold  • Right Standing Lateral Shift Correction at [place] - 3-4 x daily - 7 x weekly - 2 sets - 20 reps - 2 hold  • Side Stepping with Resistance at Feet - 1 x daily - 7 x weekly - 3 sets - 20 reps  • Standing Lumbar Extension with Counter - 1-5 x daily - 7 x weekly - 1 sets - 20 reps          Manuals 3/2 3/6 3/9 3/13 3/16 3/23 3/27 3/30                                                         Neuro Re-Ed             Seated on pball KB pallof press 4# 5\"x6 4# 6\"x5 4# 10\"x4 4# 15\"x3 4# 15\"x5 4# 30x5\" 4# 15\"x5 4# 20\"x4     paloff press On pball with 1 leg off ground BTB x15 ea On pball with 1 leg off ground BTB x15 ea On pball with 1 leg off ground " "BTB x15 ea On pball with 1 leg off ground BTB x15 ea On pball with 1 leg off ground BTB x15 ea On BOSU with 1 leg off ground BTB x20 ea On pball with 1 leg off ground BTB x20 ea On pball with 1 leg off ground BTB x20 ea     bird dogs hold            bridge with hip abd band On bosu blue HH 3x10 S/l bridge  3x10 ea S/l bridge  3x10 ea S/l bridge  3x10 ea S/l bridge on pball 3x10 ea S/l bridge on pball 3x10 ea S/l bridge on pball 3x10 ea S/l bridge on pball 3x10 ea     TA with tband ext On pball GTB 2x10 5\" hold On pball GTB 2x10 5\" hold On pball BTB 2x10 5\" hold On pball BTB x15 5\" hold On pball BTB x15 5\" hold On pball BTB x20 5\" hold On pball BTB x15 5\" hold On pball BTB x15 5\" hold     Seated TA on pball with plate weight \"drive the bus\"   5# plate wt 12\"G7 5# plate wt 16\"J8 5# plate wt 91\"N87 5# plate wt 23\"O49 5# plate wt 28\"V92 5# plate wt 26\"I89     [place] multif multif 3xF ea multif 3xF ea multif 3xF ea multif 2xF ea multif 2xF ea multif 2xF ea multif 2xF ea multif 2xF ea kneeling     Dead lift with KB 10# 2x15 10# 2x15 10# 2x15 10# 2x15 12# 2x15 15# 2x10 10# 2x15 15# 2x10     UL arm Pball press into wall with TA iso 5\"x15 ea 5\"x15 ea           Pball rotation  iso push at wall 5\"x10 bilat 5\"x10 bilat 5\"x15 bilat          Hip ext at EOM    2x10 B         Standing Wall clamshells     [place] HH 2x10 ea [place] HH 2x10 ea [place] HH 2x10 ea [place] HH 2x10 ea     [place] resisted sidestepping      10# 10x ea         Ther Ex                                                                                                        Ther Activity                                       Gait Training                                       Modalities                                            "

## 2023-04-01 DIAGNOSIS — F51.04 PSYCHOPHYSIOLOGICAL INSOMNIA: ICD-10-CM

## 2023-04-01 RX ORDER — TRAZODONE HYDROCHLORIDE 50 MG/1
25 TABLET ORAL
Qty: 45 TABLET | Refills: 3 | Status: SHIPPED | OUTPATIENT
Start: 2023-04-01

## 2023-04-03 ENCOUNTER — OFFICE VISIT (OUTPATIENT)
Dept: PHYSICAL THERAPY | Facility: REHABILITATION | Age: 68
End: 2023-04-03

## 2023-04-03 DIAGNOSIS — M54.50 ACUTE RIGHT-SIDED LOW BACK PAIN WITHOUT SCIATICA: Primary | ICD-10-CM

## 2023-04-03 NOTE — PROGRESS NOTES
Daily Note/re-eval     Today's date: 4/3/2023  Patient name: Marlyn Soulier  : 1955  MRN: [id number]  Referring provider: Lindy Kenyon, PT  Dx:   Encounter Diagnosis     ICD-10-CM    1  Acute right-sided low back pain without sciatica  M54 50           Start Time: 1615  Stop Time: 1700  Total time in clinic (min): 45 minutes    Subjective: Pt reports overall 75% improvement overall  Pain  Current pain ratin  At best pain ratin  At worst pain ratin-7 in the past, 8 at last RE, 9 at eval       Objective: See treatment diary below  Lumbar   Flexion:  full and painfree  Extension:   full and pain free  Left lateral flexion:  WNL  Right lateral flexion:  WNL  Left rotation:  WNL  Right rotation:  WNL     Strength/Myotome Testing   Hip abd   R = 4+/5  L = 4+/5    Hip ext  R = 5  L = 5        Muscle Activation   TrA: fair, poor control with LE movement  At eval   : Good with improved LE control compared to eval  4/3: very minimal            Assessment: Pt presents for her 19th therapy visit  Since her last RE, pt has made further improvement in hip and core strength and now reports 75% improvement in sx's  Her max pain level continues to improve and she continues to progress towards her therapy goals  Pt will benefit from continued skilled outpatient PT to improve strength, to address therapy goals, to reduce pain, and improve function          Goals  Short term goals: to be met in 3-4 weeks  Pt independent with initial HEP, rationale, technique and frequency, for ROM and pain control  MET  Pt will report at least a 25% reduction in subjective pain complaints/symptoms to better manage ADLs and household chores  MET  Improve max pain level by 2 points on the numeric pain rating scale indicating a clinically significant reduction in pain level  MET  Pt will be able to effectively isolate and recruit the TrA without Valsalva or global abdominal contraction to improve lumbopelvic stability  "MET    Long term goals: to be met in 6-8 weeks IN PROGRESS  Pt will have full and pain free lumbar ROM to better manage ADLs and household chores  Pt will report a 75% or > reduction in subjective pain complaints/symptoms to better manage ADLs and household chores  Improve B hip abduction MMT to a 4/5 or greater for improved lumbopelvic stability  Pt will improve FOTO score to better then expected outcome indicating an overall improvement in pain and function   Pt will be able to perform ADLs, iADLS, and hobbies without pain or restriction indicating return to PLOF  Pt independent with rationale, technique and plan for performance of advanced HEP to ensure independent self-management of symptoms upon discharge  Achieve pts therapy goal: relief of pain       Plan: Continue per plan of care  Precautions: HTN, psoriatic arthritis      Access Code: [email or web address]  URL: [email or web address]  Date: 02/09/2023  Prepared by: Carol Jung    Exercises  • Supine Transversus Abdominis Bracing - Hands on Stomach - 10-20 x daily - 7 x weekly - 1 sets - 10 reps - 5 hold  • Clamshell - 1 x daily - 7 x weekly - 3 sets - 10 reps - 5 hold  • Small Range Straight Leg Raise - 1 x daily - 7 x weekly - 2 sets - 15 reps - 3 hold  • Right Standing Lateral Shift Correction at [place] - 3-4 x daily - 7 x weekly - 2 sets - 20 reps - 2 hold  • Side Stepping with Resistance at Feet - 1 x daily - 7 x weekly - 3 sets - 20 reps  • Standing Lumbar Extension with Counter - 1-5 x daily - 7 x weekly - 1 sets - 20 reps          Manuals 3/2 3/6 3/9 3/13 3/16 3/23 3/27 3/30 4/3             RE - DS                                           Neuro Re-Ed             Seated on pball KB pallof press 4# 5\"x6 4# 6\"x5 4# 10\"x4 4# 15\"x3 4# 15\"x5 4# 30x5\" 4# 15\"x5 4# 20\"x4 4# 20\"x4    paloff press On pball with 1 leg off ground BTB x15 ea On pball with 1 leg off ground BTB x15 ea On pball with 1 leg off ground BTB x15 ea On pball with 1 leg " "off ground BTB x15 ea On pball with 1 leg off ground BTB x15 ea On BOSU with 1 leg off ground BTB x20 ea On pball with 1 leg off ground BTB x20 ea On pball with 1 leg off ground BTB x20 ea On pball with 1 leg off ground gray TB x20 ea    bird dogs hold            bridge with hip abd band On bosu blue HH 3x10 S/l bridge  3x10 ea S/l bridge  3x10 ea S/l bridge  3x10 ea S/l bridge on pball 3x10 ea S/l bridge on pball 3x10 ea S/l bridge on pball 3x10 ea S/l bridge on pball 3x10 ea S/l bridge on pball 3x10 ea    TA with tband ext On pball GTB 2x10 5\" hold On pball GTB 2x10 5\" hold On pball BTB 2x10 5\" hold On pball BTB x15 5\" hold On pball BTB x15 5\" hold On pball BTB x20 5\" hold On pball BTB x15 5\" hold On pball BTB x15 5\" hold On pball gray TB x15 5\" hold    Seated TA on pball with plate weight \"drive the bus\"   5# plate wt 61\"J9 5# plate wt 42\"M6 5# plate wt 04\"P46 5# plate wt 45\"X92 5# plate wt 91\"M38 5# plate wt 74\"C72 5# plate wt 13\"E31    redcord multif multif 3xF ea multif 3xF ea multif 3xF ea multif 2xF ea multif 2xF ea multif 2xF ea multif 2xF ea multif 2xF ea kneeling multif 2xF standing on airex    Dead lift with KB 10# 2x15 10# 2x15 10# 2x15 10# 2x15 12# 2x15 15# 2x10 10# 2x15 15# 2x10 15# 2x15    UL arm Pball press into wall with TA iso 5\"x15 ea 5\"x15 ea           Pball rotation  iso push at wall 5\"x10 bilat 5\"x10 bilat 5\"x15 bilat          Hip ext at EOM    2x10 B         Standing Wall clamshells     [place] [facility] 2x10 ea [place] [facility] 2x10 ea [place] [facility] 2x10 ea [place] [facility] 2x10 ea nv    [place] resisted sidestepping      10# 10x ea         Ther Ex             J band side step 1-1 to 10-10         Small blk 1-10                                                                                  Ther Activity                                       Gait Training                                       Modalities                                            "

## 2023-04-05 NOTE — PROGRESS NOTES
Daily Note     Today's date: 2023  Patient name: Nani Flowers  : 1955  MRN: [id number]  Referring provider: Aleisha Johnson, PT  Dx:   Encounter Diagnosis     ICD-10-CM    1  Acute right-sided low back pain without sciatica  M54 52           Start Time: 1538  Stop Time: 1632  Total time in clinic (min): 54 minutes    Subjective: Pt states she took Aleve for her knee today and that [facility] to [facility] as long as she avoids moving certain ways  Objective: See treatment diary below      Assessment: Pt reports B shoulder pain and clicking  Administered prone scap retractions and s/l ER to address these impairments and provided pt with HEP  Cues given to avoid too much ER during s/l ER and to avoid ER to the point of clicking  With cue, pt able to perform without clicking  Pt also able to tolerate record multif in kneeling today, though she required cues for neutral spine, to relax the shoulder, and to prevent hip flexion compensation  Pt will benefit from continued skilled outpatient PT to improve strength, to address therapy goals, to reduce pain, and improve function  Plan: Continue per plan of care  Precautions: HTN, psoriatic arthritis      Access Code: [email or web address]  URL: [email or web address]  Date: 2023  Prepared by: Huan Bracket    Exercises  • Supine Transversus Abdominis Bracing - Hands on Stomach - 10-20 x daily - 7 x weekly - 1 sets - 10 reps - 5 hold  • Clamshell - 1 x daily - 7 x weekly - 3 sets - 10 reps - 5 hold  • Small Range Straight Leg Raise - 1 x daily - 7 x weekly - 2 sets - 15 reps - 3 hold  • Right Standing Lateral Shift Correction at [place] - 3-4 x daily - 7 x weekly - 2 sets - 20 reps - 2 hold  • Side Stepping with Resistance at Feet - 1 x daily - 7 x weekly - 3 sets - 20 reps  • Standing Lumbar Extension with Counter - 1-5 x daily - 7 x weekly - 1 sets - 20 reps          Manuals 3/2 3/6 3/9 3/13 3/16 3/23 3/27 3/30 4/3 4/6 "           RE - DS                                           Neuro Re-Ed             Seated on pball KB pallof press 4# 5\"x6 4# 6\"x5 4# 10\"x4 4# 15\"x3 4# 15\"x5 4# 30x5\" 4# 15\"x5 4# 20\"x4 4# 20\"x4 4# 20\"x4   paloff press On pball with 1 leg off ground BTB x15 ea On pball with 1 leg off ground BTB x15 ea On pball with 1 leg off ground BTB x15 ea On pball with 1 leg off ground BTB x15 ea On pball with 1 leg off ground BTB x15 ea On BOSU with 1 leg off ground BTB x20 ea On pball with 1 leg off ground BTB x20 ea On pball with 1 leg off ground BTB x20 ea On pball with 1 leg off ground gray TB x20 ea On pball with 1 leg off ground gray TB x20 ea   bridge with hip abd band On bosu blue HH 3x10 S/l bridge  3x10 ea S/l bridge  3x10 ea S/l bridge  3x10 ea S/l bridge on pball 3x10 ea S/l bridge on pball 3x10 ea S/l bridge on pball 3x10 ea S/l bridge on pball 3x10 ea S/l bridge on pball 3x10 ea S/l bridge on pball 3x10 ea   TA with tband ext On pball GTB 2x10 5\" hold On pball GTB 2x10 5\" hold On pball BTB 2x10 5\" hold On pball BTB x15 5\" hold On pball BTB x15 5\" hold On pball BTB x20 5\" hold On pball BTB x15 5\" hold On pball BTB x15 5\" hold On pball gray TB x15 5\" hold On pball gray TB x15 5\" hold   Seated TA on pball with plate weight \"drive the bus\"   5# plate wt 52\"D9 5# plate wt 09\"Q2 5# plate wt 57\"Y98 5# plate wt 33\"K70 5# plate wt 90\"Z30 5# plate wt 78\"K69 5# plate wt 28\"D22 5# plate wt 61\"O51   redcord multif multif 3xF ea multif 3xF ea multif 3xF ea multif 2xF ea multif 2xF ea multif 2xF ea multif 2xF ea multif 2xF ea kneeling multif 2xF standing on airex multif 2xF kneeling   Dead lift with KB 10# 2x15 10# 2x15 10# 2x15 10# 2x15 12# 2x15 15# 2x10 10# 2x15 15# 2x10 15# 2x15 15# 2x15   UL arm Pball press into wall with TA iso 5\"x15 ea 5\"x15 ea           Pball rotation  iso push at wall 5\"x10 bilat 5\"x10 bilat 5\"x15 bilat          Hip ext at EOM    2x10 B         Standing Wall isaias     [place] HH 2x10 ea Ac HH 2x10 " "deirdre Shen HH 2x10 deirdre Shen HH 2x10 ea shyanne Muro resisted sidestepping      10# 10x ea         Ther Ex             J band side step 1-1 to 10-10         Small blk 1-10                 Prone scap retract          10\"x10 hep   S/l shoulder ER with wt          1# 3x10 hep                                          Ther Activity                                       Gait Training                                       Modalities                                            "

## 2023-04-06 ENCOUNTER — OFFICE VISIT (OUTPATIENT)
Dept: PHYSICAL THERAPY | Facility: REHABILITATION | Age: 68
End: 2023-04-06

## 2023-04-06 DIAGNOSIS — M54.50 ACUTE RIGHT-SIDED LOW BACK PAIN WITHOUT SCIATICA: Primary | ICD-10-CM

## 2023-04-13 ENCOUNTER — APPOINTMENT (OUTPATIENT)
Dept: PHYSICAL THERAPY | Facility: REHABILITATION | Age: 68
End: 2023-04-13

## 2023-04-20 PROBLEM — I34.1 MITRAL VALVE PROLAPSE: Status: RESOLVED | Noted: 2022-03-10 | Resolved: 2023-04-20

## 2023-10-20 ENCOUNTER — APPOINTMENT (OUTPATIENT)
Dept: RADIOLOGY | Facility: CLINIC | Age: 68
End: 2023-10-20
Payer: MEDICARE

## 2023-10-20 ENCOUNTER — OFFICE VISIT (OUTPATIENT)
Dept: OBGYN CLINIC | Facility: CLINIC | Age: 68
End: 2023-10-20
Payer: MEDICARE

## 2023-10-20 VITALS
HEART RATE: 75 BPM | DIASTOLIC BLOOD PRESSURE: 84 MMHG | HEIGHT: 67 IN | BODY MASS INDEX: 23.23 KG/M2 | SYSTOLIC BLOOD PRESSURE: 147 MMHG | WEIGHT: 148 LBS

## 2023-10-20 DIAGNOSIS — M25.561 RIGHT KNEE PAIN, UNSPECIFIED CHRONICITY: ICD-10-CM

## 2023-10-20 DIAGNOSIS — M17.11 PRIMARY OSTEOARTHRITIS OF RIGHT KNEE: Primary | ICD-10-CM

## 2023-10-20 PROCEDURE — 73564 X-RAY EXAM KNEE 4 OR MORE: CPT

## 2023-10-20 PROCEDURE — 99213 OFFICE O/P EST LOW 20 MIN: CPT | Performed by: STUDENT IN AN ORGANIZED HEALTH CARE EDUCATION/TRAINING PROGRAM

## 2023-10-20 NOTE — PROGRESS NOTES
Knee New Office Note    Assessment:     1. Primary osteoarthritis of right knee        Plan:  Patient is experiencing osteoarthritis of right knee. Patient has been benefiting from conservative treatments for the right knee such as taking anti-inflammatories and bracing. Patient was encouraged today to continue taking anti-inflammatories as needed and continue bracing as needed. Patient was educated on corticosteroid injections and advised patient if her right knee pain continues to worsen over time she may return back to the clinic for right knee CSI. Patient will follow-up as needed for reevaluation and right knee CSI. Problem List Items Addressed This Visit    None  Visit Diagnoses       Primary osteoarthritis of right knee    -  Primary    Relevant Orders    XR knee 4+ vw right injury           Subjective:     Patient ID: Marianne Holbrook is a 74 y.o. female. Chief Complaint:  HPI:  Patient is a 73-year-old female here for initial evaluation of right knee pain. Patient states her right knee pain has been going on for 6 months that has increased in pain over time and feels it gets stiff at times. Patient states she tries to stay active playing pickle ball and biking and reports mild swelling after certain sessions. Patient states her pain is along the medial and lateral joint line. Patient states prolonged walking aggravates her pain and states she takes Aleve or ibuprofen as needed which provides relief for her. Patient states she does wear a brace when playing pickle ball and biking and states it relieves her pain. Patient has had no previous surgeries, no previous injections, no formal physical therapy for the right knee.       Allergy:  Allergies   Allergen Reactions    Albumen, Egg - Food Allergy Other (See Comments)    Casein - Food Allergy Other (See Comments)    Gluten Meal - Food Allergy Other (See Comments)    Milk-Related Compounds - Food Allergy Other (See Comments)    Sulfa Antibiotics Hives     Medications:  all current active meds have been reviewed  Past Medical History:  Past Medical History:   Diagnosis Date    Anxiety     Arthritis     Heart valve disease     Mitro valve prolapse    Hypertension     Psoriasis     Psoriatic arthritis ([address])     Varicella      Past Surgical History:  Past Surgical History:   Procedure Laterality Date    JOINT REPLACEMENT Left 2016    LEFT OOPHORECTOMY Left 2007    benign cyst    OOPHORECTOMY      REPLACEMENT TOTAL KNEE Left      Family History:  Family History   Problem Relation Age of Onset    Stroke Mother     Supraventricular tachycardia Mother     Psoriasis Mother     Psoriatic Arthritis Mother     Hypertension Mother         Mother and father    Dementia Mother         Mother    Squamous cell carcinoma Mother         skin- unknown age    Hypertension Father     Heart disease Father     Heart attack Father     Kidney cancer Sister         19's    Cancer Sister         Kidney removed due to cancer    No Known Problems Sister     No Known Problems Daughter     Colon cancer Maternal Grandmother     Cancer Maternal Grandmother         Colon cancer    No Known Problems Maternal Grandfather     No Known Problems Paternal Grandmother     Prostate cancer Paternal Grandfather         90's    No Known Problems Maternal Aunt     No Known Problems Maternal Aunt      Social History:  Social History     Substance and Sexual Activity   Alcohol Use Not Currently    Alcohol/week: 1.0 standard drink of alcohol    Types: 1 Glasses of wine per week    Comment: With dinner     Social History     Substance and Sexual Activity   Drug Use Not Currently    Types: Marijuana    Comment:  Many years ago     Social History     Tobacco Use   Smoking Status Never   Smokeless Tobacco Never           ROS:  General: Per HPI  Skin: Negative, except if noted below  HEENT: Negative  Respiratory: Negative  Cardiovascular: Negative  Gastrointestinal: Negative  Urinary: Negative  Vascular: Negative  Musculoskeletal: Positive per HPI   Neurologic: Positive per HPI  Endocrine: Negative    Objective:  BP Readings from Last 1 Encounters:   10/20/23 147/84      Wt Readings from Last 1 Encounters:   10/20/23 67.1 kg (148 lb)        Respiratory:   non-labored respirations    Lymphatics:  no palpable lymph nodes    Gait:   Normal    Neurologic:   Alert and oriented times   Patient with normal sensation except as noted below  Deep tendon reflexes 2+ except as noted in MSK exam    Bilateral Lower Extremity:  Left Knee: Inspection:  skin intact    Overall limb alignment normal    Effusion: none    ROM 0-115 wo pain    Extensor Lag: none    Palpation: no Joint line tenderness to palpation    AP Stability at 90 deg     M/L stability in full extension     M/L stability in midflexion     Motor: 5/5 Q/HS/TA/GS/P    Pulses: 2+ DP / 2+ PT    SILT DP/SP/S/S/TN    Right knee: Inspection:  skin intact    Overall limb alignment neutral    Effusion: minimal     ROM 0-110 wo pain    Extensor Lag: none    Palpation: lateral Joint line tenderness to palpation    AP Stability at 90 deg     M/L stability in full extension     M/L stability in midflexion     Motor: 5/5 Q/HS/TA/GS/P    Pulses: 2+ DP / 2+ PT    SILT DP/SP/S/S/TN    Imaging:  My interpretation XR AP scanogram/AP bilateral knee/lateral/saunders/sunrise right knee: mild to moderate joint space narrowing, subchondral sclerosis, subchondral cysts, osteophyte formation. No fracture or dislocation. BMI:   Estimated body mass index is 23.53 kg/m² as calculated from the following:    Height as of this encounter: 5' 6.5" (1.689 m). Weight as of this encounter: 67.1 kg (148 lb). BSA:   Estimated body surface area is 1.77 meters squared as calculated from the following:    Height as of this encounter: 5' 6.5" (1.689 m). Weight as of this encounter: 67.1 kg (148 lb).            Scribe Attestation      I,:  Marriviri Geovani am acting as a scribe while in the presence of the attending physician.:       I,:  Bala Rojas DO personally performed the services described in this documentation    as scribed in my presence.:

## 2024-01-25 ENCOUNTER — OFFICE VISIT (OUTPATIENT)
Dept: FAMILY MEDICINE CLINIC | Facility: CLINIC | Age: 69
End: 2024-01-25
Payer: MEDICARE

## 2024-01-25 VITALS
OXYGEN SATURATION: 96 % | WEIGHT: 159 LBS | HEART RATE: 70 BPM | DIASTOLIC BLOOD PRESSURE: 80 MMHG | RESPIRATION RATE: 16 BRPM | TEMPERATURE: 97.6 F | BODY MASS INDEX: 24.96 KG/M2 | SYSTOLIC BLOOD PRESSURE: 124 MMHG | HEIGHT: 67 IN

## 2024-01-25 DIAGNOSIS — Z12.11 COLON CANCER SCREENING: ICD-10-CM

## 2024-01-25 DIAGNOSIS — K58.1 IRRITABLE BOWEL SYNDROME WITH CONSTIPATION: ICD-10-CM

## 2024-01-25 DIAGNOSIS — Z80.0 FAMILY HISTORY OF COLON CANCER: ICD-10-CM

## 2024-01-25 DIAGNOSIS — Z00.00 MEDICARE ANNUAL WELLNESS VISIT, SUBSEQUENT: Primary | ICD-10-CM

## 2024-01-25 DIAGNOSIS — M85.80 OSTEOPENIA, UNSPECIFIED LOCATION: ICD-10-CM

## 2024-01-25 DIAGNOSIS — E53.8 VITAMIN B12 DEFICIENCY: ICD-10-CM

## 2024-01-25 DIAGNOSIS — M75.41 IMPINGEMENT SYNDROME OF RIGHT SHOULDER: ICD-10-CM

## 2024-01-25 DIAGNOSIS — N39.3 STRESS INCONTINENCE: ICD-10-CM

## 2024-01-25 DIAGNOSIS — E55.9 VITAMIN D DEFICIENCY: ICD-10-CM

## 2024-01-25 DIAGNOSIS — L40.50 PSORIATIC ARTHRITIS (HCC): Chronic | ICD-10-CM

## 2024-01-25 DIAGNOSIS — I10 HYPERTENSION, ESSENTIAL, BENIGN: ICD-10-CM

## 2024-01-25 PROCEDURE — 99214 OFFICE O/P EST MOD 30 MIN: CPT | Performed by: FAMILY MEDICINE

## 2024-01-25 PROCEDURE — G0438 PPPS, INITIAL VISIT: HCPCS | Performed by: FAMILY MEDICINE

## 2024-01-25 NOTE — PATIENT INSTRUCTIONS
Medicare Preventive Visit Patient Instructions  Thank you for completing your Welcome to Medicare Visit or Medicare Annual Wellness Visit today. Your next wellness visit will be due in one year (1/25/2025).  The screening/preventive services that you may require over the next 5-10 years are detailed below. Some tests may not apply to you based off risk factors and/or age. Screening tests ordered at today's visit but not completed yet may show as past due. Also, please note that scanned in results may not display below.  Preventive Screenings:  Service Recommendations Previous Testing/Comments   Colorectal Cancer Screening  * Colonoscopy    * Fecal Occult Blood Test (FOBT)/Fecal Immunochemical Test (FIT)  * Fecal DNA/Cologuard Test  * Flexible Sigmoidoscopy Age: 45-75 years old   Colonoscopy: every 10 years (may be performed more frequently if at higher risk)  OR  FOBT/FIT: every 1 year  OR  Cologuard: every 3 years  OR  Sigmoidoscopy: every 5 years  Screening may be recommended earlier than age 45 if at higher risk for colorectal cancer. Also, an individualized decision between you and your healthcare provider will decide whether screening between the ages of 76-85 would be appropriate. Colonoscopy: 08/22/2018  FOBT/FIT: Not on file  Cologuard: Not on file  Sigmoidoscopy: 08/22/2018    Screening Current     Breast Cancer Screening Age: 40+ years old  Frequency: every 1-2 years  Not required if history of left and right mastectomy Mammogram: 10/27/2022    Screening Current   Cervical Cancer Screening Between the ages of 21-29, pap smear recommended once every 3 years.   Between the ages of 30-65, can perform pap smear with HPV co-testing every 5 years.   Recommendations may differ for women with a history of total hysterectomy, cervical cancer, or abnormal pap smears in past. Pap Smear: 04/20/2023    Screening Not Indicated   Hepatitis C Screening Once for adults born between 1945 and 1965  More frequently in  patients at high risk for Hepatitis C Hep C Antibody: Not on file        Diabetes Screening 1-2 times per year if you're at risk for diabetes or have pre-diabetes Fasting glucose: 89 mg/dL (1/11/2023)  A1C: No results in last 5 years (No results in last 5 years)      Cholesterol Screening Once every 5 years if you don't have a lipid disorder. May order more often based on risk factors. Lipid panel: 04/12/2023    Screening Not Indicated  History Lipid Disorder     Other Preventive Screenings Covered by Medicare:  Abdominal Aortic Aneurysm (AAA) Screening: covered once if your at risk. You're considered to be at risk if you have a family history of AAA.  Lung Cancer Screening: covers low dose CT scan once per year if you meet all of the following conditions: (1) Age 55-77; (2) No signs or symptoms of lung cancer; (3) Current smoker or have quit smoking within the last 15 years; (4) You have a tobacco smoking history of at least 20 pack years (packs per day multiplied by number of years you smoked); (5) You get a written order from a healthcare provider.  Glaucoma Screening: covered annually if you're considered high risk: (1) You have diabetes OR (2) Family history of glaucoma OR (3)  aged 50 and older OR (4)  American aged 65 and older  Osteoporosis Screening: covered every 2 years if you meet one of the following conditions: (1) You're estrogen deficient and at risk for osteoporosis based off medical history and other findings; (2) Have a vertebral abnormality; (3) On glucocorticoid therapy for more than 3 months; (4) Have primary hyperparathyroidism; (5) On osteoporosis medications and need to assess response to drug therapy.   Last bone density test (DXA Scan): 01/18/2023.  HIV Screening: covered annually if you're between the age of 15-65. Also covered annually if you are younger than 15 and older than 65 with risk factors for HIV infection. For pregnant patients, it is covered up to 3  times per pregnancy.    Immunizations:  Immunization Recommendations   Influenza Vaccine Annual influenza vaccination during flu season is recommended for all persons aged >= 6 months who do not have contraindications   Pneumococcal Vaccine   * Pneumococcal conjugate vaccine = PCV13 (Prevnar 13), PCV15 (Vaxneuvance), PCV20 (Prevnar 20)  * Pneumococcal polysaccharide vaccine = PPSV23 (Pneumovax) Adults 19-65 yo with certain risk factors or if 65+ yo  If never received any pneumonia vaccine: recommend Prevnar 20 (PCV20)  Give PCV20 if previously received 1 dose of PCV13 or PPSV23   Hepatitis B Vaccine 3 dose series if at intermediate or high risk (ex: diabetes, end stage renal disease, liver disease)   Respiratory syncytial virus (RSV) Vaccine - COVERED BY MEDICARE PART D  * RSVPreF3 (Arexvy) CDC recommends that adults 60 years of age and older may receive a single dose of RSV vaccine using shared clinical decision-making (SCDM)   Tetanus (Td) Vaccine - COST NOT COVERED BY MEDICARE PART B Following completion of primary series, a booster dose should be given every 10 years to maintain immunity against tetanus. Td may also be given as tetanus wound prophylaxis.   Tdap Vaccine - COST NOT COVERED BY MEDICARE PART B Recommended at least once for all adults. For pregnant patients, recommended with each pregnancy.   Shingles Vaccine (Shingrix) - COST NOT COVERED BY MEDICARE PART B  2 shot series recommended in those 19 years and older who have or will have weakened immune systems or those 50 years and older     Health Maintenance Due:      Topic Date Due   • Hepatitis C Screening  Never done   • Breast Cancer Screening: Mammogram  10/27/2023   • Colorectal Cancer Screening  08/22/2028     Immunizations Due:      Topic Date Due   • COVID-19 Vaccine (4 - 2023-24 season) 09/01/2023     Advance Directives   What are advance directives?  Advance directives are legal documents that state your wishes and plans for medical care.  These plans are made ahead of time in case you lose your ability to make decisions for yourself. Advance directives can apply to any medical decision, such as the treatments you want, and if you want to donate organs.   What are the types of advance directives?  There are many types of advance directives, and each state has rules about how to use them. You may choose a combination of any of the following:  Living will:  This is a written record of the treatment you want. You can also choose which treatments you do not want, which to limit, and which to stop at a certain time. This includes surgery, medicine, IV fluid, and tube feedings.   Durable power of  for healthcare (DPAHC):  This is a written record that states who you want to make healthcare choices for you when you are unable to make them for yourself. This person, called a proxy, is usually a family member or a friend. You may choose more than 1 proxy.  Do not resuscitate (DNR) order:  A DNR order is used in case your heart stops beating or you stop breathing. It is a request not to have certain forms of treatment, such as CPR. A DNR order may be included in other types of advance directives.  Medical directive:  This covers the care that you want if you are in a coma, near death, or unable to make decisions for yourself. You can list the treatments you want for each condition. Treatment may include pain medicine, surgery, blood transfusions, dialysis, IV or tube feedings, and a ventilator (breathing machine).  Values history:  This document has questions about your views, beliefs, and how you feel and think about life. This information can help others choose the care that you would choose.  Why are advance directives important?  An advance directive helps you control your care. Although spoken wishes may be used, it is better to have your wishes written down. Spoken wishes can be misunderstood, or not followed. Treatments may be given even if you  do not want them. An advance directive may make it easier for your family to make difficult choices about your care.   Urinary Incontinence   Urinary incontinence (UI)  is when you lose control of your bladder. UI develops because your bladder cannot store or empty urine properly. The 3 most common types of UI are stress incontinence, urge incontinence, or both.  Medicines:   May be given to help strengthen your bladder control. Report any side effects of medication to your healthcare provider.  Do pelvic muscle exercises often:  Your pelvic muscles help you stop urinating. Squeeze these muscles tight for 5 seconds, then relax for 5 seconds. Gradually work up to squeezing for 10 seconds. Do 3 sets of 15 repetitions a day, or as directed. This will help strengthen your pelvic muscles and improve bladder control.  Train your bladder:  Go to the bathroom at set times, such as every 2 hours, even if you do not feel the urge to go. You can also try to hold your urine when you feel the urge to go. For example, hold your urine for 5 minutes when you feel the urge to go. As that becomes easier, hold your urine for 10 minutes.   Self-care:   Keep a UI record.  Write down how often you leak urine and how much you leak. Make a note of what you were doing when you leaked urine.  Drink liquids as directed. You may need to limit the amount of liquid you drink to help control your urine leakage. Do not drink any liquid right before you go to bed. Limit or do not have drinks that contain caffeine or alcohol.   Prevent constipation.  Eat a variety of high-fiber foods. Good examples are high-fiber cereals, beans, vegetables, and whole-grain breads. Walking is the best way to trigger your intestines to have a bowel movement.  Exercise regularly and maintain a healthy weight.  Weight loss and exercise will decrease pressure on your bladder and help you control your leakage.   Use a catheter as directed  to help empty your bladder. A  catheter is a tiny, plastic tube that is put into your bladder to drain your urine.   Go to behavior therapy as directed.  Behavior therapy may be used to help you learn to control your urge to urinate.    Weight Management   Why it is important to manage your weight:  Being overweight increases your risk of health conditions such as heart disease, high blood pressure, type 2 diabetes, and certain types of cancer. It can also increase your risk for osteoarthritis, sleep apnea, and other respiratory problems. Aim for a slow, steady weight loss. Even a small amount of weight loss can lower your risk of health problems.  How to lose weight safely:  A safe and healthy way to lose weight is to eat fewer calories and get regular exercise. You can lose up about 1 pound a week by decreasing the number of calories you eat by 500 calories each day.   Healthy meal plan for weight management:  A healthy meal plan includes a variety of foods, contains fewer calories, and helps you stay healthy. A healthy meal plan includes the following:  Eat whole-grain foods more often.  A healthy meal plan should contain fiber. Fiber is the part of grains, fruits, and vegetables that is not broken down by your body. Whole-grain foods are healthy and provide extra fiber in your diet. Some examples of whole-grain foods are whole-wheat breads and pastas, oatmeal, brown rice, and bulgur.  Eat a variety of vegetables every day.  Include dark, leafy greens such as spinach, kale, mila greens, and mustard greens. Eat yellow and orange vegetables such as carrots, sweet potatoes, and winter squash.   Eat a variety of fruits every day.  Choose fresh or canned fruit (canned in its own juice or light syrup) instead of juice. Fruit juice has very little or no fiber.  Eat low-fat dairy foods.  Drink fat-free (skim) milk or 1% milk. Eat fat-free yogurt and low-fat cottage cheese. Try low-fat cheeses such as mozzarella and other reduced-fat  cheeses.  Choose meat and other protein foods that are low in fat.  Choose beans or other legumes such as split peas or lentils. Choose fish, skinless poultry (chicken or turkey), or lean cuts of red meat (beef or pork). Before you cook meat or poultry, cut off any visible fat.   Use less fat and oil.  Try baking foods instead of frying them. Add less fat, such as margarine, sour cream, regular salad dressing and mayonnaise to foods. Eat fewer high-fat foods. Some examples of high-fat foods include french fries, doughnuts, ice cream, and cakes.  Eat fewer sweets.  Limit foods and drinks that are high in sugar. This includes candy, cookies, regular soda, and sweetened drinks.  Exercise:  Exercise at least 30 minutes per day on most days of the week. Some examples of exercise include walking, biking, dancing, and swimming. You can also fit in more physical activity by taking the stairs instead of the elevator or parking farther away from stores. Ask your healthcare provider about the best exercise plan for you.      © Copyright [employer] 2018 Information is for End User's use only and may not be sold, redistributed or otherwise used for commercial purposes. All illustrations and images included in CareNotes® are the copyrighted property of [employer] or [employer]

## 2024-01-25 NOTE — ASSESSMENT & PLAN NOTE
Blood pressure is excellent on my recheck today.  Home blood pressures indicate excellent systolic blood pressure and somewhat elevated diastolic pressure in the 90s at times.  Patient will bring her blood pressure cuff for calibration to the forthcoming appointment with cardiology

## 2024-01-25 NOTE — PROGRESS NOTES
Assessment and Plan:     Problem List Items Addressed This Visit        Digestive    Irritable bowel syndrome     Metamucil  daily         Relevant Orders    Ambulatory referral to Gastroenterology       Cardiovascular and Mediastinum    Hypertension, essential, benign (Chronic)     Blood pressure is excellent on my recheck today.  Home blood pressures indicate excellent systolic blood pressure and somewhat elevated diastolic pressure in the 90s at times.  Patient will bring her blood pressure cuff for calibration to the forthcoming appointment with cardiology         Relevant Orders    CBC and differential    Comprehensive metabolic panel    Lipid Panel with Direct LDL reflex    TSH, 3rd generation       Musculoskeletal and Integument    Psoriatic arthritis (HCC) (Chronic)      Follows with dermatology, on Tremfya         Relevant Orders    C-reactive protein    Sedimentation rate, automated    Osteopenia     DEXA  2023 - osteopenia            Other    Family history of colon cancer     Family history of colon cancer grandmother  Previous colonoscopy 2018  I recommend to schedule follow-up with [facility] for consideration of colonoscopy now due to family history         Relevant Orders    Ambulatory referral to Gastroenterology   Other Visit Diagnoses     Medicare annual wellness visit, subsequent    -  Primary    Colon cancer screening        Relevant Orders    Ambulatory referral to Gastroenterology    Vitamin D deficiency        Relevant Orders    Vitamin D 25 hydroxy    Vitamin B12 deficiency        Relevant Orders    Vitamin B12    Impingement syndrome of right shoulder        Relevant Orders    Ambulatory Referral to Orthopedic Surgery    Stress incontinence        Relevant Orders    Ambulatory Referral to Urogynecology        I suspect patient presents with soft tissue lipoma lower back.  Previous ultrasound was not able to detect it. I advised patient to follow-up with her dermatologist regarding this  concern.       Preventive health issues were discussed with patient, and age appropriate screening tests were ordered as noted in patient's After Visit Summary.  Personalized health advice and appropriate referrals for health education or preventive services given if needed, as noted in patient's After Visit Summary.     History of Present Illness:     Patient presents for a Medicare Wellness Visit    Follow-up chronic medical conditions/Medicare wellness.      Cardiology -  - annual visit    Dermatology -     Ortho -      Mammo 1/30/24   UTD with colonoscopy - due in 8/2028 as per previous GI   Fhx - colon CA-  grandmother, so likely will be due for colonoscopy now.   DEXA  1/ 2023 osteopenia     UTD with pneumonia and Shingrix     OTC vit D daily     Reports symptoms of stress urinary incontinence     Shoulder pain - right, hesitation with ROM    Patient reports lower back lump, movable, had it for a while.Previous soft issue US  of this are failed to detect it.                                      Patient Care Team:  Diana Sam MD as PCP - General (Family Medicine)     Review of Systems:     Review of Systems   Constitutional: Negative.    HENT: Negative.     Eyes: Negative.    Respiratory: Negative.     Cardiovascular: Negative.    Gastrointestinal: Negative.    Endocrine: Negative.    Genitourinary: Negative.         UI, as per HPI   Musculoskeletal:  Positive for arthralgias.        Right shoulder pain   Skin: Negative.    Allergic/Immunologic: Negative.    Neurological: Negative.    Hematological: Negative.    Psychiatric/Behavioral: Negative.          Problem List:     Patient Active Problem List   Diagnosis   • Psoriasis   • Psoriatic arthritis (HCC)   • Hypertension, essential, benign   • Irritable bowel syndrome   • Psychophysiological insomnia   • Allergic rhinitis   • Bilateral bunions   • Dense breast tissue   • Family history of kidney cancer   • Family history  of colon cancer   • Family history of COPD (chronic obstructive pulmonary disease)   • Family history of macular degeneration   • History of partial knee replacement   • Pure hypercholesterolemia   • Restless leg syndrome   • Osteopenia      Past Medical and Surgical History:     Past Medical History:   Diagnosis Date   • Anxiety    • Arthritis    • Heart valve disease     Mitro valve prolapse   • Hypertension    • Psoriasis    • Psoriatic arthritis (HCC)    • Varicella      Past Surgical History:   Procedure Laterality Date   • JOINT REPLACEMENT Left 2016   • LEFT OOPHORECTOMY Left 2007    benign cyst   • OOPHORECTOMY     • REPLACEMENT TOTAL KNEE Left       Family History:     Family History   Problem Relation Age of Onset   • Stroke Mother    • Supraventricular tachycardia Mother    • Psoriasis Mother    • Psoriatic Arthritis Mother    • Hypertension Mother         Mother and father   • Dementia Mother         Mother   • Squamous cell carcinoma Mother         skin- unknown age   • Hypertension Father    • Heart disease Father    • Heart attack Father    • Kidney cancer Sister         20's   • Cancer Sister         Kidney removed due to cancer   • No Known Problems Sister    • No Known Problems Daughter    • Colon cancer Maternal Grandmother 71   • Cancer Maternal Grandmother         Colon cancer   • No Known Problems Maternal Grandfather    • No Known Problems Paternal Grandmother    • Prostate cancer Paternal Grandfather         90's   • No Known Problems Maternal Aunt    • No Known Problems Maternal Aunt       Social History:     Social History     Socioeconomic History   • Marital status:      Spouse name: None   • Number of children: None   • Years of education: None   • Highest education level: None   Occupational History   • Occupation: retired   Tobacco Use   • Smoking status: Never   • Smokeless tobacco: Never   Vaping Use   • Vaping status: Never Used   Substance and Sexual Activity   • Alcohol  use: Not Currently     Alcohol/week: 1.0 standard drink of alcohol     Types: 1 Glasses of wine per week     Comment: With dinner   • Drug use: Not Currently     Types: Marijuana     Comment: Many years ago   • Sexual activity: Not Currently     Partners: Male     Birth control/protection: Post-menopausal     Comment: Not active in a long time.  No partners, no interest   Other Topics Concern   • None   Social History Narrative   • None     Social Determinants of Health     Financial Resource Strain: Low Risk  (1/19/2024)    Overall Financial Resource Strain (CARDIA)    • Difficulty of Paying Living Expenses: Not hard at all   Food Insecurity: Not on file   Transportation Needs: No Transportation Needs (1/19/2024)    PRAPARE - Transportation    • Lack of Transportation (Medical): No    • Lack of Transportation (Non-Medical): No   Physical Activity: Not on file   Stress: Not on file   Social Connections: Not on file   Intimate Partner Violence: Not on file   Housing Stability: Not on file      Medications and Allergies:     Current Outpatient Medications   Medication Sig Dispense Refill   • CALCIUM PO Take by mouth     • Cholecalciferol (Vitamin D3) 50 MCG (2000 UT) capsule      • cyanocobalamin (VITAMIN B-12) 100 MCG tablet Take 100 mcg by mouth daily     • dicyclomine (BENTYL) 10 mg capsule TAKE 1 CAPSULE BY MOUTH 3 TIMES A DAY BEFORE MEALS 270 capsule 1   • fluticasone (FLONASE) 50 mcg/act nasal spray SPRAY 2 SPRAYS INTO EACH NOSTRIL EVERY DAY (Patient taking differently: if needed) 48 mL 1   • guselkumab (TREMFYA) subcutaneous injection Inject 100 mg under the skin every 2 (two) months Medication on hold until insurance clearance     • psyllium (METAMUCIL) 58.6 % packet Take 1 packet by mouth 2 (two) times a day     • Tiadylt  MG 24 hr capsule Take 1 capsule (120 mg total) by mouth daily 90 capsule 3   • traZODone (DESYREL) 50 mg tablet TAKE 0.5 TABLETS (25 MG TOTAL) BY MOUTH DAILY AT BEDTIME AS NEEDED FOR  SLEEP 45 tablet 3   • Turmeric 400 MG CAPS Take by mouth 2 (two) times a day       No current facility-administered medications for this visit.     Allergies   Allergen Reactions   • Albumen, Egg - Food Allergy Other (See Comments)   • Casein - Food Allergy Other (See Comments)   • Gluten Meal - Food Allergy Other (See Comments)   • Milk-Related Compounds - Food Allergy Other (See Comments)   • Sulfa Antibiotics Hives      Immunizations:     Immunization History   Administered Date(s) Administered   • COVID-19 J&J (POKKT) vaccine 0.5 mL 03/08/2021, 11/24/2021   • COVID-19 Pfizer vac (Dylan-sucrose, gray cap) 12 yr+ IM 08/29/2022   • INFLUENZA 10/01/2015, 10/01/2018, 10/23/2021, 10/07/2022, 09/15/2023   • Influenza LAIV (Nasal) 10/20/2008   • Influenza Quadrivalent 3 years and older 10/23/2021   • Influenza Split 09/20/2011   • Influenza, seasonal, injectable, preservative free 10/17/2016, 09/10/2020   • Pneumococcal Conjugate Vaccine 20-valent (Pcv20), Polysace 01/09/2023   • Pneumococcal Polysaccharide PPV23 04/03/2000, 01/14/2009   • Td (adult), adsorbed 01/01/2002   • Tdap 05/10/2012   • Zoster Vaccine Recombinant 02/27/2020, 06/16/2020      Health Maintenance:         Topic Date Due   • Hepatitis C Screening  Never done   • Breast Cancer Screening: Mammogram  10/27/2023   • Colorectal Cancer Screening  08/22/2028         Topic Date Due   • COVID-19 Vaccine (4 - 2023-24 season) 09/01/2023      Medicare Screening Tests and Risk Assessments:     Rosalind is here for her Subsequent Wellness visit. Last Medicare Wellness visit information reviewed, patient interviewed and updates made to the record today.      Health Risk Assessment:   Patient rates overall health as good. Patient feels that their physical health rating is same. Patient is satisfied with their life. Eyesight was rated as slightly worse. Hearing was rated as same. Patient feels that their emotional and mental health rating is same. Patients states  they are never, rarely angry. Patient states they are never, rarely unusually tired/fatigued. Pain experienced in the last 7 days has been some. Patient's pain rating has been 2/10. Patient states that she has experienced no weight loss or gain in last 6 months.     Depression Screening:   PHQ-2 Score: 0      Fall Risk Screening:   In the past year, patient has experienced: no history of falling in past year      Urinary Incontinence Screening:   Patient has leaked urine accidently in the last six months.     Home Safety:  Patient does not have trouble with stairs inside or outside of their home. Patient has working smoke alarms and has working carbon monoxide detector. Home safety hazards include: none.     Nutrition:   Current diet is Regular and Limited junk food.     Medications:   Patient is currently taking over-the-counter supplements. OTC medications include: see medication list. Patient is able to manage medications.     Activities of Daily Living (ADLs)/Instrumental Activities of Daily Living (IADLs):   Walk and transfer into and out of bed and chair?: Yes  Dress and groom yourself?: Yes    Bathe or shower yourself?: Yes    Feed yourself? Yes  Do your laundry/housekeeping?: Yes  Manage your money, pay your bills and track your expenses?: Yes  Make your own meals?: Yes    Do your own shopping?: Yes    Previous Hospitalizations:   Any hospitalizations or ED visits within the last 12 months?: No      Advance Care Planning:   Living will: Yes    Durable POA for healthcare: Yes    Advanced directive: Yes      Cognitive Screening:   Provider or family/friend/caregiver concerned regarding cognition?: No    PREVENTIVE SCREENINGS      Cardiovascular Screening:    General: Screening Not Indicated and History Lipid Disorder      Diabetes Screening:     General: Screening Current      Colorectal Cancer Screening:     General: Screening Current    Due for: Colonoscopy - High Risk      Breast Cancer Screening:      "General: Screening Current    Due for: Mammogram        Cervical Cancer Screening:    General: Screening Not Indicated and Screening Current      Osteoporosis Screening:    General: Screening Current      Abdominal Aortic Aneurysm (AAA) Screening:        General: Screening Not Indicated      Lung Cancer Screening:     General: Screening Not Indicated      Hepatitis C Screening:    General: Screening Not Indicated    Screening, Brief Intervention, and Referral to Treatment (SBIRT)    Screening  Typical number of drinks in a day: 0  Typical number of drinks in a week: 1  Interpretation: Low risk drinking behavior.    Single Item Drug Screening:  How often have you used an illegal drug (including marijuana) or a prescription medication for non-medical reasons in the past year? never    Single Item Drug Screen Score: 0  Interpretation: Negative screen for possible drug use disorder    No results found.     Physical Exam:     /80   Pulse 70   Temp 97.6 °F (36.4 °C) (Temporal)   Resp 16   Ht 5' 6.5\" (1.689 m)   Wt 72.1 kg (159 lb)   SpO2 96%   BMI 25.28 kg/m²     Physical Exam  Vitals and nursing note reviewed.   Constitutional:       General: She is not in acute distress.     Appearance: Normal appearance. She is well-developed. She is not ill-appearing.   HENT:      Head: Normocephalic and atraumatic.   Eyes:      General: No scleral icterus.     Conjunctiva/sclera: Conjunctivae normal.   Neck:      Vascular: No carotid bruit.   Cardiovascular:      Rate and Rhythm: Normal rate and regular rhythm.      Heart sounds: Normal heart sounds. No murmur heard.  Pulmonary:      Effort: Pulmonary effort is normal. No respiratory distress.      Breath sounds: Normal breath sounds.   Abdominal:      General: Bowel sounds are normal. There is no abdominal bruit.      Palpations: Abdomen is soft.      Tenderness: There is no abdominal tenderness.   Musculoskeletal:      Cervical back: Neck supple. No rigidity.      " Right lower leg: No edema.      Left lower leg: No edema.      Comments: Movable soft tissue lump right paraspinal area at L4-L5 , 1-2 cm,non tender.    Right  Shoulder: Painful abduction and extension at 90 degrees, patient is able to achieve nearly full range of motion with hesitation between  degrees.   Skin:     General: Skin is warm.   Neurological:      General: No focal deficit present.      Mental Status: She is alert and oriented to person, place, and time.   Psychiatric:         Behavior: Behavior normal.          Diana Sam MD

## 2024-01-27 NOTE — ASSESSMENT & PLAN NOTE
Family history of colon cancer grandmother  Previous colonoscopy 2018  I recommend to schedule follow-up with [facility] for consideration of colonoscopy now due to family history

## 2024-01-30 ENCOUNTER — HOSPITAL ENCOUNTER (OUTPATIENT)
Dept: MAMMOGRAPHY | Facility: IMAGING CENTER | Age: 69
Discharge: HOME | End: 2024-01-30
Payer: MEDICARE

## 2024-01-30 VITALS — HEIGHT: 67 IN | BODY MASS INDEX: 24.96 KG/M2 | WEIGHT: 159 LBS

## 2024-01-30 DIAGNOSIS — Z12.31 ENCOUNTER FOR SCREENING MAMMOGRAM FOR MALIGNANT NEOPLASM OF BREAST: ICD-10-CM

## 2024-01-30 PROCEDURE — 77067 SCR MAMMO BI INCL CAD: CPT

## 2024-01-30 PROCEDURE — 77063 BREAST TOMOSYNTHESIS BI: CPT

## 2024-02-14 ENCOUNTER — TELEPHONE (OUTPATIENT)
Dept: OBGYN CLINIC | Facility: CLINIC | Age: 69
End: 2024-02-14

## 2024-02-14 NOTE — TELEPHONE ENCOUNTER
Left voice mail message for patient to call back to reschedule appointment with Dr. Weinberg to another provider (Dr. Weinberg does not treat shoulder pain). Provided number to reschedule [phone].

## 2024-02-18 DIAGNOSIS — M75.41 IMPINGEMENT SYNDROME OF RIGHT SHOULDER: Primary | ICD-10-CM

## 2024-02-23 ENCOUNTER — APPOINTMENT (OUTPATIENT)
Dept: LAB | Facility: CLINIC | Age: 69
End: 2024-02-23
Payer: MEDICARE

## 2024-02-23 DIAGNOSIS — I10 HYPERTENSION, ESSENTIAL, BENIGN: ICD-10-CM

## 2024-02-23 DIAGNOSIS — L40.50 PSORIATIC ARTHRITIS (HCC): Chronic | ICD-10-CM

## 2024-02-23 DIAGNOSIS — E55.9 VITAMIN D DEFICIENCY: ICD-10-CM

## 2024-02-23 DIAGNOSIS — E53.8 VITAMIN B12 DEFICIENCY: ICD-10-CM

## 2024-02-23 LAB
25(OH)D3 SERPL-MCNC: 31.4 NG/ML (ref 30–100)
ALBUMIN SERPL BCP-MCNC: 4.5 G/DL (ref 3.5–5)
ALP SERPL-CCNC: 78 U/L (ref 34–104)
ALT SERPL W P-5'-P-CCNC: 15 U/L (ref 7–52)
ANION GAP SERPL CALCULATED.3IONS-SCNC: 10 MMOL/L
AST SERPL W P-5'-P-CCNC: 20 U/L (ref 13–39)
BASOPHILS # BLD AUTO: 0.06 THOUSANDS/ÂΜL (ref 0–0.1)
BASOPHILS NFR BLD AUTO: 1 % (ref 0–1)
BILIRUB SERPL-MCNC: 0.58 MG/DL (ref 0.2–1)
BUN SERPL-MCNC: 15 MG/DL (ref 5–25)
CALCIUM SERPL-MCNC: 9.5 MG/DL (ref 8.4–10.2)
CHLORIDE SERPL-SCNC: 105 MMOL/L (ref 96–108)
CHOLEST SERPL-MCNC: 232 MG/DL
CO2 SERPL-SCNC: 25 MMOL/L (ref 21–32)
CREAT SERPL-MCNC: 0.88 MG/DL (ref 0.6–1.3)
CRP SERPL QL: 1.1 MG/L
EOSINOPHIL # BLD AUTO: 0.12 THOUSAND/ÂΜL (ref 0–0.61)
EOSINOPHIL NFR BLD AUTO: 3 % (ref 0–6)
ERYTHROCYTE [DISTWIDTH] IN BLOOD BY AUTOMATED COUNT: 12.6 % (ref 11.6–15.1)
ERYTHROCYTE [SEDIMENTATION RATE] IN BLOOD: 19 MM/HOUR (ref 0–29)
GFR SERPL CREATININE-BSD FRML MDRD: 68 ML/MIN/1.73SQ M
GLUCOSE P FAST SERPL-MCNC: 92 MG/DL (ref 65–99)
HCT VFR BLD AUTO: 47.4 % (ref 34.8–46.1)
HDLC SERPL-MCNC: 89 MG/DL
HGB BLD-MCNC: 15.5 G/DL (ref 11.5–15.4)
IMM GRANULOCYTES # BLD AUTO: 0.01 THOUSAND/UL (ref 0–0.2)
IMM GRANULOCYTES NFR BLD AUTO: 0 % (ref 0–2)
LDLC SERPL CALC-MCNC: 123 MG/DL (ref 0–100)
LYMPHOCYTES # BLD AUTO: 1.4 THOUSANDS/ÂΜL (ref 0.6–4.47)
LYMPHOCYTES NFR BLD AUTO: 30 % (ref 14–44)
MCH RBC QN AUTO: 30.7 PG (ref 26.8–34.3)
MCHC RBC AUTO-ENTMCNC: 32.7 G/DL (ref 31.4–37.4)
MCV RBC AUTO: 94 FL (ref 82–98)
MONOCYTES # BLD AUTO: 0.36 THOUSAND/ÂΜL (ref 0.17–1.22)
MONOCYTES NFR BLD AUTO: 8 % (ref 4–12)
NEUTROPHILS # BLD AUTO: 2.75 THOUSANDS/ÂΜL (ref 1.85–7.62)
NEUTS SEG NFR BLD AUTO: 58 % (ref 43–75)
NRBC BLD AUTO-RTO: 0 /100 WBCS
PLATELET # BLD AUTO: 329 THOUSANDS/UL (ref 149–390)
PMV BLD AUTO: 9.7 FL (ref 8.9–12.7)
POTASSIUM SERPL-SCNC: 4.9 MMOL/L (ref 3.5–5.3)
PROT SERPL-MCNC: 7 G/DL (ref 6.4–8.4)
RBC # BLD AUTO: 5.05 MILLION/UL (ref 3.81–5.12)
SODIUM SERPL-SCNC: 140 MMOL/L (ref 135–147)
TRIGL SERPL-MCNC: 101 MG/DL
TSH SERPL DL<=0.05 MIU/L-ACNC: 2.17 UIU/ML (ref 0.45–4.5)
VIT B12 SERPL-MCNC: 742 PG/ML (ref 180–914)
WBC # BLD AUTO: 4.7 THOUSAND/UL (ref 4.31–10.16)

## 2024-02-23 PROCEDURE — 85652 RBC SED RATE AUTOMATED: CPT

## 2024-02-23 PROCEDURE — 86140 C-REACTIVE PROTEIN: CPT

## 2024-02-23 PROCEDURE — 80061 LIPID PANEL: CPT

## 2024-02-23 PROCEDURE — 85025 COMPLETE CBC W/AUTO DIFF WBC: CPT

## 2024-02-23 PROCEDURE — 80053 COMPREHEN METABOLIC PANEL: CPT

## 2024-02-23 PROCEDURE — 82607 VITAMIN B-12: CPT

## 2024-02-23 PROCEDURE — 82306 VITAMIN D 25 HYDROXY: CPT

## 2024-02-23 PROCEDURE — 36415 COLL VENOUS BLD VENIPUNCTURE: CPT

## 2024-02-23 PROCEDURE — 84443 ASSAY THYROID STIM HORMONE: CPT

## 2024-02-27 ENCOUNTER — APPOINTMENT (OUTPATIENT)
Dept: RADIOLOGY | Facility: OTHER | Age: 69
End: 2024-02-27
Payer: MEDICARE

## 2024-02-27 ENCOUNTER — OFFICE VISIT (OUTPATIENT)
Dept: OBGYN CLINIC | Facility: OTHER | Age: 69
End: 2024-02-27
Payer: MEDICARE

## 2024-02-27 VITALS
HEART RATE: 71 BPM | SYSTOLIC BLOOD PRESSURE: 149 MMHG | DIASTOLIC BLOOD PRESSURE: 87 MMHG | BODY MASS INDEX: 25.28 KG/M2 | HEIGHT: 67 IN

## 2024-02-27 DIAGNOSIS — M75.41 IMPINGEMENT SYNDROME OF RIGHT SHOULDER: ICD-10-CM

## 2024-02-27 DIAGNOSIS — M25.511 RIGHT SHOULDER PAIN, UNSPECIFIED CHRONICITY: Primary | ICD-10-CM

## 2024-02-27 DIAGNOSIS — R29.898 SHOULDER WEAKNESS: ICD-10-CM

## 2024-02-27 DIAGNOSIS — M67.911 ROTATOR CUFF DISORDER, RIGHT: ICD-10-CM

## 2024-02-27 DIAGNOSIS — M25.511 RIGHT SHOULDER PAIN, UNSPECIFIED CHRONICITY: ICD-10-CM

## 2024-02-27 PROCEDURE — 73030 X-RAY EXAM OF SHOULDER: CPT

## 2024-02-27 PROCEDURE — 99203 OFFICE O/P NEW LOW 30 MIN: CPT | Performed by: ORTHOPAEDIC SURGERY

## 2024-02-27 NOTE — PROGRESS NOTES
Chief Complaint: Right shoulder pain  Patient has been referred in this regard by her primary care physician Dr. Diana Sam MD    HPI:    Rosalind Guerrero is a 68year old Female who presents today for evaluation of right shoulder pain      Description of symptoms: Patient reports right shoulder pain for a few weeks.  She plays pickle ball and has noticed pain on the lateral aspect of the right shoulder with some radiation down her right arm.  Symptoms are made worse with overhead activity, lifting or sleeping on the affected side.  She also notices some associated weakness of the right shoulder.  She does not have any known history of previous right shoulder surgery or injury.      I have personally reviewed pertinent films in PACS and my interpretation is plain radiograph of the right shoulder performed today does not reveal any acute osseous injury.  Mild acromioclavicular degenerative change noted..    Patient Active Problem List   Diagnosis    Psoriasis    Psoriatic arthritis (HCC)    Hypertension, essential, benign    Irritable bowel syndrome    Psychophysiological insomnia    Allergic rhinitis    Bilateral bunions    Dense breast tissue    Family history of kidney cancer    Family history of colon cancer    Family history of COPD (chronic obstructive pulmonary disease)    Family history of macular degeneration    History of partial knee replacement    Pure hypercholesterolemia    Restless leg syndrome    Osteopenia        Current Outpatient Medications on File Prior to Visit   Medication Sig Dispense Refill    CALCIUM PO Take by mouth      Cholecalciferol (Vitamin D3) 50 MCG (2000 UT) capsule       dicyclomine (BENTYL) 10 mg capsule TAKE 1 CAPSULE BY MOUTH 3 TIMES A DAY BEFORE MEALS 270 capsule 1    guselkumab (TREMFYA) subcutaneous injection Inject 100 mg under the skin every 2 (two) months Medication on hold until insurance clearance      psyllium (METAMUCIL) 58.6 % packet Take 1 packet by mouth 2  (two) times a day      Tiadylt  MG 24 hr capsule Take 1 capsule (120 mg total) by mouth daily 90 capsule 3    traZODone (DESYREL) 50 mg tablet TAKE 0.5 TABLETS (25 MG TOTAL) BY MOUTH DAILY AT BEDTIME AS NEEDED FOR SLEEP 45 tablet 3    Turmeric 400 MG CAPS Take by mouth 2 (two) times a day      cyanocobalamin (VITAMIN B-12) 100 MCG tablet Take 100 mcg by mouth daily (Patient not taking: Reported on 2/27/2024)      fluticasone (FLONASE) 50 mcg/act nasal spray SPRAY 2 SPRAYS INTO EACH NOSTRIL EVERY DAY (Patient taking differently: if needed) 48 mL 1     No current facility-administered medications on file prior to visit.        Allergies   Allergen Reactions    Albumen, Egg - Food Allergy Other (See Comments)    Casein - Food Allergy Other (See Comments)    Gluten Meal - Food Allergy Other (See Comments)    Milk-Related Compounds - Food Allergy Other (See Comments)    Sulfa Antibiotics Hives        Tobacco Use: Low Risk  (2/27/2024)    Patient History     Smoking Tobacco Use: Never     Smokeless Tobacco Use: Never     Passive Exposure: Not on file        Social Determinants of Health     Tobacco Use: Low Risk  (2/27/2024)    Patient History     Smoking Tobacco Use: Never     Smokeless Tobacco Use: Never     Passive Exposure: Not on file   Alcohol Use: Not At Risk (1/9/2023)    AUDIT-C     Frequency of Alcohol Consumption: 2-3 times a week     Average Number of Drinks: 1 or 2     Frequency of Binge Drinking: Never   Financial Resource Strain: Low Risk  (1/19/2024)    Overall Financial Resource Strain (CARDIA)     Difficulty of Paying Living Expenses: Not hard at all   Food Insecurity: Not on file   Transportation Needs: No Transportation Needs (1/19/2024)    PRAPARE - Transportation     Lack of Transportation (Medical): No     Lack of Transportation (Non-Medical): No   Physical Activity: Not on file   Stress: Not on file   Social Connections: Not on file   Intimate Partner Violence: Not on file   Depression: Not  at risk (1/25/2024)    PHQ-2     PHQ-2 Score: 0   Housing Stability: Not on file   Utilities: Not on file   Health Literacy: Not on file               Review of Systems     Body mass index is 25.28 kg/m².     Physical Exam  Vitals and nursing note reviewed.   HENT:      Head: Atraumatic.   Eyes:      Conjunctiva/sclera: Conjunctivae normal.   Cardiovascular:      Rate and Rhythm: Normal rate.      Pulses: Normal pulses.   Pulmonary:      Effort: Pulmonary effort is normal. No respiratory distress.   Neurological:      Mental Status: She is alert and oriented to person, place, and time.   Psychiatric:         Mood and Affect: Mood normal.         Behavior: Behavior normal.          Ortho Exam:    Body part: right shoulder    Inspection: No clinically visible deformity    Palpation: No significant areas of joint line tenderness    Range of motion: Forward flexion is 170 degrees, abduction is 160 degrees, internal rotation is at the thoracolumbar level, external rotation in adduction is 70 degrees and normal cross adduction    Special Tests: Positive empty can test.  Negative Kirby and negative Neer's.  Rotator cuff strength is subscapularis 5/5, supraspinatus and infraspinatus are 4/5 with pain on resisted testing, teres minor is 5/5.  No anterior apprehension.  Negative acromioclavicular Man's compression test.  Reports some discomfort with Bates's.    Distal Neurovascular Status: Intact, Yes        Assessment:     Diagnosis ICD-10-CM Associated Orders   1. Right shoulder pain, unspecified chronicity  M25.511 XR shoulder 2+ vw right     MRI shoulder right wo contrast     Ambulatory Referral to Physical Therapy      2. Impingement syndrome of right shoulder  M75.41 Ambulatory referral to Sports Medicine      3. Rotator cuff disorder, right  M67.911 MRI shoulder right wo contrast     Ambulatory Referral to Physical Therapy      4. Shoulder weakness  R29.898 MRI shoulder right wo contrast     Ambulatory Referral to  "Physical Therapy           Plan:    I had a detailed discussion with the patient with regards to her right shoulder pain and weakness.  I have concern for underlying rotator cuff disorder/tear and hence we will request an MRI of the right shoulder for further assessment.  I will also refer her for a course of physical therapy rehabilitation.  She may take oral NSAID if needed for pain control.  Will follow-up after her right shoulder MRI.  Patient expressed understanding and was in agreement with the treatment plan.            Portions of the record may have been created with voice recognition software. Occasional wrong word or \"sound alike\" substitutions may have occurred due to the inherent limitations of voice recognition software. Please review the chart carefully and recognize, using context, where substitutions/typographical errors may have occurred.           "

## 2024-02-28 ENCOUNTER — HOSPITAL ENCOUNTER (OUTPATIENT)
Dept: RADIOLOGY | Age: 69
Discharge: HOME | End: 2024-02-28
Payer: MEDICARE

## 2024-02-28 DIAGNOSIS — M67.911 ROTATOR CUFF DISORDER, RIGHT: ICD-10-CM

## 2024-02-28 DIAGNOSIS — M25.511 RIGHT SHOULDER PAIN, UNSPECIFIED CHRONICITY: ICD-10-CM

## 2024-02-28 DIAGNOSIS — R29.898 SHOULDER WEAKNESS: ICD-10-CM

## 2024-02-28 PROCEDURE — 73221 MRI JOINT UPR EXTREM W/O DYE: CPT

## 2024-03-04 ENCOUNTER — OFFICE VISIT (OUTPATIENT)
Dept: OBGYN CLINIC | Facility: OTHER | Age: 69
End: 2024-03-04
Payer: MEDICARE

## 2024-03-04 VITALS
HEART RATE: 88 BPM | HEIGHT: 67 IN | DIASTOLIC BLOOD PRESSURE: 88 MMHG | BODY MASS INDEX: 25.28 KG/M2 | SYSTOLIC BLOOD PRESSURE: 155 MMHG

## 2024-03-04 DIAGNOSIS — I10 HYPERTENSION, ESSENTIAL, BENIGN: ICD-10-CM

## 2024-03-04 DIAGNOSIS — S46.011D TRAUMATIC TEAR OF RIGHT ROTATOR CUFF, UNSPECIFIED TEAR EXTENT, SUBSEQUENT ENCOUNTER: Primary | ICD-10-CM

## 2024-03-04 DIAGNOSIS — M25.511 RIGHT SHOULDER PAIN, UNSPECIFIED CHRONICITY: ICD-10-CM

## 2024-03-04 PROCEDURE — 99213 OFFICE O/P EST LOW 20 MIN: CPT | Performed by: ORTHOPAEDIC SURGERY

## 2024-03-04 RX ORDER — DILTIAZEM HYDROCHLORIDE 120 MG/1
120 CAPSULE, EXTENDED RELEASE ORAL DAILY
Qty: 90 CAPSULE | Refills: 3 | Status: SHIPPED | OUTPATIENT
Start: 2024-03-04

## 2024-03-04 NOTE — PROGRESS NOTES
"Assessment:       1. Traumatic tear of right rotator cuff, unspecified tear extent, subsequent encounter    2. Right shoulder pain, unspecified chronicity          Plan:        Explained my current clinical findings and reviewed the radiological findings with the patient today.  She does have a full-thickness right shoulder supraspinatus tendon tear with associated pain and weakness of the right shoulder.  She is very physically active.  We had a detailed discussion about management options and I recommend her to have a surgical opinion in this regard.  A referral to Dr. Joseph Larkin has been made for the same.  Patient expresses understanding and was in agreement with the treatment plan.  In the interim, she is advised to continue with right shoulder range of motion exercises but avoid doing any heavy lifting type activity from the right upper extremity.            Subjective:     Patient ID: Rosalind Guerrero is a 68 y.o. female.    Chief Complaint:    HPI  Patient presents today for a follow-up of her right shoulder pain and weakness.  Last seen in this regard on 2/27/2024.  Last clinical evaluation was concerning for rotator cuff disorder/tear and hence an MRI of the right shoulder was performed on 2/28/2024.  This reveals:    \" 1. Full-thickness tear of the posterior supraspinatus tendon at the humeral insertion extending into the conjoint tendon. Insertional tendinosis of the residual intact supraspinatus and infraspinatus tendon fibers. No muscle atrophy.  2. Mild tendinosis of the long head biceps tendon without tear.  3. Mild degenerative changes.\"    Symptomatically, the patient does report weakness of the right shoulder especially with abduction activity with continued moderate intensity pain.  She is taking oral NSAID and acetaminophen with partial symptom relief.  Social History     Occupational History    Occupation: retired   Tobacco Use    Smoking status: Never    Smokeless tobacco: Never   Vaping Use    " Vaping status: Never Used   Substance and Sexual Activity    Alcohol use: Not Currently     Alcohol/week: 1.0 standard drink of alcohol     Types: 1 Glasses of wine per week     Comment: With dinner    Drug use: Not Currently     Types: Marijuana     Comment: Many years ago    Sexual activity: Not Currently     Partners: Male     Birth control/protection: Post-menopausal     Comment: Not active in a long time.  No partners, no interest      Review of Systems        Objective:     Right Shoulder Exam     Tenderness   Right shoulder tenderness location: Supraspinatus.    Range of Motion   Active abduction:  160   External rotation:  80   Forward flexion:  170   Internal rotation 0 degrees:  Lumbar     Muscle Strength   Abduction: 4/5   Internal rotation: 5/5   External rotation: 4/5   Supraspinatus: 4/5   Subscapularis: 5/5   Biceps: 4/5     Tests   Apprehension: negative  Kirby test: negative  Cross arm: negative  Impingement: negative    Other   Sensation: normal        Physical Exam  Vitals and nursing note reviewed.   Constitutional:       Appearance: She is well-developed.   HENT:      Head: Normocephalic and atraumatic.   Cardiovascular:      Rate and Rhythm: Normal rate.   Pulmonary:      Effort: Pulmonary effort is normal. No respiratory distress.   Skin:     General: Skin is warm and dry.      Findings: No erythema.   Neurological:      Mental Status: She is alert and oriented to person, place, and time.      Cranial Nerves: No cranial nerve deficit.   Psychiatric:         Behavior: Behavior normal.         Thought Content: Thought content normal.         Judgment: Judgment normal.           I have personally reviewed pertinent films in PACS and my interpretation is as noted above in the HPI.

## 2024-03-07 ENCOUNTER — EVALUATION (OUTPATIENT)
Dept: PHYSICAL THERAPY | Facility: REHABILITATION | Age: 69
End: 2024-03-07
Payer: MEDICARE

## 2024-03-07 DIAGNOSIS — R29.898 SHOULDER WEAKNESS: ICD-10-CM

## 2024-03-07 DIAGNOSIS — M25.511 RIGHT SHOULDER PAIN, UNSPECIFIED CHRONICITY: ICD-10-CM

## 2024-03-07 DIAGNOSIS — M67.911 ROTATOR CUFF DISORDER, RIGHT: ICD-10-CM

## 2024-03-07 PROCEDURE — 97110 THERAPEUTIC EXERCISES: CPT | Performed by: PHYSICAL THERAPIST

## 2024-03-07 PROCEDURE — 97161 PT EVAL LOW COMPLEX 20 MIN: CPT | Performed by: PHYSICAL THERAPIST

## 2024-03-07 NOTE — PROGRESS NOTES
PT Evaluation     Today's date: 3/7/2024  Patient name: Rosalind Guerrero  : 1955  MRN: [id number]  Referring provider: Aranza Gómez MD  Dx:   Encounter Diagnosis     ICD-10-CM    1. Right shoulder pain, unspecified chronicity  M25.511 Ambulatory Referral to Physical Therapy      2. Rotator cuff disorder, right  M67.911 Ambulatory Referral to Physical Therapy      3. Shoulder weakness  R29.898 Ambulatory Referral to Physical Therapy          Start Time: 1615  Stop Time: 1655  Total time in clinic (min): 40 minutes    Assessment  Assessment details: Problem List:  1) R shoulder weakness  2) R shoulder pain with OH and lifting    Rosalind Guerrero is a pleasant 68 y.o. female who presents with R shoulder pain and weakness.  she has pain and weakness when performing functional activities with shoulder resulting in the pain she is experiencing and fear of not being able to stay active.  No further referral appears necessary at this time based upon examination results.  Pt will follow up following consultation with orthopedic surgery. Melita would benefit from skilled physical therapy to address her limitations and allow her to return to all recreational activities.   Impairments: abnormal or restricted ROM, activity intolerance, impaired physical strength, lacks appropriate home exercise program, pain with function, weight-bearing intolerance and poor posture     Symptom irritability: lowUnderstanding of Dx/Px/POC: good   Prognosis: good    Goals  ST-4 weeks  Patient will be independent with home exercise program.   Patient will be able to manage symptoms independently.  Patient will decrease pain by 25-50%    LTG: by discharge  Patient will improve FOTO to goal  Patient will report minimal (1-2/10) pain with aggravating activities to display improvements in overall functional status        Plan  Patient would benefit from: skilled physical therapy  Planned modality interventions: cryotherapy, thermotherapy:  hydrocollator packs and unattended electrical stimulation  Planned therapy interventions: IADL retraining, joint mobilization, manual therapy, massage, ADL training, activity modification, abdominal trunk stabilization, ADL retraining, balance, balance/weight bearing training, neuromuscular re-education, body mechanics training, behavior modification, strengthening, stretching, therapeutic activities, therapeutic exercise, therapeutic training, transfer training, graded exercise, graded motor, home exercise program, graded activity, gait training, functional ROM exercises, patient education, postural training, IASTM, kinesiology taping and flexibility  Frequency: 2x week  Duration in visits: 16  Duration in weeks: 8  Treatment plan discussed with: patient        Subjective Evaluation    History of Present Illness  Mechanism of injury: Rosalind is a 68 y.o. female presenting to physical therapy on 24 with referral from MD for R shoulder pain and weakness. Has been bothering her for a couple months. Always stretches before exercising. Had some pain when playing pickleball, but continued to play through it. Has an appointment with Dr. Larkin on the  for evaluation for surgery. Denies numbness/tingling.           Quality of life: good    Patient Goals  Patient goals for therapy: increased strength, independence with ADLs/IADLs, return to sport/leisure activities, decreased pain and increased motion    Pain  Current pain ratin  At best pain ratin  At worst pain ratin  Location: R shoulder  Relieving factors: relaxation and rest  Aggravating factors: overhead activity          Objective  Posture:  Myotomes all intact b/l  Dermatome: all intact b/l             MMT         AROM          PROM    Shoulder       L       R        L           R      L     R   Flex. 4+ 3+ WFL WFL P     Abd. 4+ 4- WFL WFL P     IR. 4+ 5 WFL WFL     ER. 4+ 3+ WFL WFL              Rhomboid         Mid Trap         Low Trap          Serratus         Infraspnatus         Teres Major         Sub Scap              Precautions: R shoulder pain    Manuals 3/7                                                                Neuro Re-Ed 3/7                                                                                                       Ther Ex 3/7                                                                                                       HEP/education 10'            Ther Activity                                       Gait Training                                       Modalities

## 2024-03-11 ENCOUNTER — TELEPHONE (OUTPATIENT)
Age: 69
End: 2024-03-11

## 2024-03-11 NOTE — TELEPHONE ENCOUNTER
Caller: Patient    Doctor: Trae    Reason for call: Patient is scheduled for a NP appt on 3/22/24 for a rt torn rotator cuff. She is asking how far Dr. Larkin is booking out for surgeries. She will need to have a tooth pulled and is trying to decide if she should wait until after the surgery or get the tooth pulled before the surgery.     Call back#: [phone]

## 2024-03-18 ENCOUNTER — TELEPHONE (OUTPATIENT)
Age: 69
End: 2024-03-18

## 2024-03-18 NOTE — TELEPHONE ENCOUNTER
Caller: Melita    Doctor: Trae    Reason for call: Patient would like to know what thickness she should get for post SX of the shoulder. Patient doesn't see  till 3/22 as NP    Call back#: [phone]

## 2024-03-19 ENCOUNTER — OFFICE VISIT (OUTPATIENT)
Dept: CARDIOLOGY CLINIC | Facility: CLINIC | Age: 69
End: 2024-03-19
Payer: MEDICARE

## 2024-03-19 VITALS
WEIGHT: 158.2 LBS | BODY MASS INDEX: 24.83 KG/M2 | OXYGEN SATURATION: 97 % | SYSTOLIC BLOOD PRESSURE: 156 MMHG | HEART RATE: 76 BPM | HEIGHT: 67 IN | DIASTOLIC BLOOD PRESSURE: 94 MMHG

## 2024-03-19 DIAGNOSIS — I10 HYPERTENSION, ESSENTIAL, BENIGN: Chronic | ICD-10-CM

## 2024-03-19 DIAGNOSIS — E78.00 PURE HYPERCHOLESTEROLEMIA: Primary | ICD-10-CM

## 2024-03-19 PROCEDURE — 99213 OFFICE O/P EST LOW 20 MIN: CPT | Performed by: INTERNAL MEDICINE

## 2024-03-19 PROCEDURE — 93000 ELECTROCARDIOGRAM COMPLETE: CPT | Performed by: INTERNAL MEDICINE

## 2024-03-19 NOTE — PROGRESS NOTES
[facility] Cardiology  Office Consultation  Rosalind Guerrero 68 y.o. female MRN: [id number]        1. Pure hypercholesterolemia  Assessment & Plan:  She has a high HDL  She is a lifetime non-smoker  Her ASCVD risk calculates low  She does have a calcium score of 186  Her vascular triple screen was normal  She does not have a family history of premature vascular disease or CAD  She continues to strive for healthy lifestyle primarily    Orders:  -     POCT ECG    2. Hypertension, essential, benign  Assessment & Plan:  Primarily felt to be uncontrolled only in the office  However brings in 2 weeks of blood pressure recordings from January that show consistently elevated diastolic blood pressures between 82 and 93.  She is compliant with diltiazem which has been taking a long time  This is also in the context of recent rotator cuff tear, discomfort, and contemplating having rotator cuff repair.        Plan    While her home blood pressures do show some concerning diastolic blood pressure elevations, this is not severe enough that we imminently need to make new med changes and adjustments prior to undergoing rotator cuff surgery.  I would like her to complete her surgery, allow her time to get out of her sling so she can monitor her blood pressure more regularly, and then we will get her off diltiazem in favor of possibly Lotrel, or just amlodipine to start.  She has no restrictions from a cardiac perspective to proceed with rotator cuff surgery      HPI: Rosalind Guerrero is a 68 y.o. year old female with hypertension, a family history of stroke and CAD but not premature, elevated calcium score of 186, normal vascular ultrasound triple screen, with a recent rotator cuff tear probably due to pickleball, needing rotator cuff surgery in the next month or so, comes in with home recordings for about 2 weeks in January showing elevated diastolic blood pressures despite compliance with diltiazem.        Review of Systems    Constitutional:  Negative for appetite change, diaphoresis, fatigue and fever.   Respiratory:  Negative for chest tightness, shortness of breath and wheezing.    Cardiovascular:  Negative for chest pain, palpitations and leg swelling.   Gastrointestinal:  Negative for abdominal pain and blood in stool.   Musculoskeletal:  Positive for arthralgias. Negative for joint swelling.   Skin:  Negative for rash.   Neurological:  Negative for dizziness, syncope and light-headedness.         Past Medical History:   Diagnosis Date   • Anxiety    • Arthritis    • Heart valve disease     Mitro valve prolapse   • Hypertension    • Psoriasis    • Psoriatic arthritis (HCC)    • Varicella      Past Surgical History:   Procedure Laterality Date   • JOINT REPLACEMENT Left 2016   • LEFT OOPHORECTOMY Left 2007    benign cyst   • OOPHORECTOMY     • REPLACEMENT TOTAL KNEE Left      Social History     Substance and Sexual Activity   Alcohol Use Not Currently   • Alcohol/week: 1.0 standard drink of alcohol   • Types: 1 Glasses of wine per week    Comment: With dinner     Social History     Substance and Sexual Activity   Drug Use Not Currently   • Types: Marijuana    Comment: Many years ago     Social History     Tobacco Use   Smoking Status Never   Smokeless Tobacco Never     Family History   Problem Relation Age of Onset   • Stroke Mother    • Supraventricular tachycardia Mother    • Psoriasis Mother    • Psoriatic Arthritis Mother    • Hypertension Mother         Mother and father   • Dementia Mother         Mother   • Squamous cell carcinoma Mother         skin- unknown age   • Hypertension Father    • Heart disease Father    • Heart attack Father    • Kidney cancer Sister         20's   • No Known Problems Sister    • No Known Problems Daughter    • Colon cancer Maternal Grandmother 71   • Cancer Maternal Grandmother         Colon cancer   • No Known Problems Maternal Grandfather    • No Known Problems Paternal Grandmother    •  Prostate cancer Paternal Grandfather         90's   • No Known Problems Maternal Aunt    • No Known Problems Maternal Aunt        Allergies:  Allergies   Allergen Reactions   • Albumen, Egg - Food Allergy Other (See Comments)   • Casein - Food Allergy Other (See Comments)   • Gluten Meal - Food Allergy Other (See Comments)   • Milk-Related Compounds - Food Allergy Other (See Comments)   • Sulfa Antibiotics Hives       Medications:     Current Outpatient Medications:   •  Cholecalciferol (Vitamin D3) 50 MCG (2000 UT) capsule, , Disp: , Rfl:   •  dicyclomine (BENTYL) 10 mg capsule, TAKE 1 CAPSULE BY MOUTH 3 TIMES A DAY BEFORE MEALS, Disp: 270 capsule, Rfl: 1  •  fluticasone (FLONASE) 50 mcg/act nasal spray, SPRAY 2 SPRAYS INTO EACH NOSTRIL EVERY DAY (Patient taking differently: if needed), Disp: 48 mL, Rfl: 1  •  guselkumab (TREMFYA) subcutaneous injection, Inject 100 mg under the skin every 2 (two) months Medication on hold until insurance clearance, Disp: , Rfl:   •  psyllium (METAMUCIL) 58.6 % packet, Take 1 packet by mouth 2 (two) times a day, Disp: , Rfl:   •  Tiadylt  MG 24 hr capsule, TAKE 1 CAPSULE BY MOUTH EVERY DAY, Disp: 90 capsule, Rfl: 3  •  traZODone (DESYREL) 50 mg tablet, TAKE 0.5 TABLETS (25 MG TOTAL) BY MOUTH DAILY AT BEDTIME AS NEEDED FOR SLEEP, Disp: 45 tablet, Rfl: 3  •  Turmeric 400 MG CAPS, Take by mouth 2 (two) times a day, Disp: , Rfl:   •  CALCIUM PO, Take by mouth (Patient not taking: Reported on 3/19/2024), Disp: , Rfl:       Vitals:    03/19/24 0950   BP: 156/94   Pulse: 76   SpO2: 97%     Weight (last 2 days)     Date/Time Weight    03/19/24 0950 71.8 (158.2)        Physical Exam  Constitutional:       General: She is not in acute distress.     Appearance: Normal appearance. She is not diaphoretic.   HENT:      Head: Normocephalic and atraumatic.   Eyes:      General: No scleral icterus.     Conjunctiva/sclera: Conjunctivae normal.   Neck:      Vascular: No JVD.   Cardiovascular:     "  Rate and Rhythm: Normal rate and regular rhythm.      Heart sounds: Normal heart sounds. No murmur heard.  Pulmonary:      Effort: Pulmonary effort is normal. No respiratory distress.      Breath sounds: Normal breath sounds. No wheezing, rhonchi or rales.   Musculoskeletal:         General: No tenderness.      Right lower leg: Normal. No edema.      Left lower leg: Normal. No edema.   Skin:     General: Skin is warm and dry.   Neurological:      Mental Status: She is alert. Mental status is at baseline.           Laboratory Studies:    Labs personally reviewed    Cardiac testing:     EKG reviewed personally:   Results for orders placed or performed in visit on 03/19/24   POCT ECG    Impression    Normal sinus rhythm, normal EKG       Echocardiogram:      Stress test:      Holter:       Calcium score CT  2022-186    Vascular  2022-vascular triple screen-normal      Cardiac catheterization:        Shiva Moe MD    Portions of the record may have been created with voice recognition software.  Occasional wrong word or \"sound a like\" substitutions may have occurred due to the inherent limitations of voice recognition software.  Read the chart carefully and recognize, using context, where substitutions have occurred.  "

## 2024-03-19 NOTE — ASSESSMENT & PLAN NOTE
She has a high HDL  She is a lifetime non-smoker  Her ASCVD risk calculates low  She does have a calcium score of 186  Her vascular triple screen was normal  She does not have a family history of premature vascular disease or CAD  She continues to strive for healthy lifestyle primarily

## 2024-03-19 NOTE — ASSESSMENT & PLAN NOTE
Primarily felt to be uncontrolled only in the office  However brings in 2 weeks of blood pressure recordings from January that show consistently elevated diastolic blood pressures between 82 and 93.  She is compliant with diltiazem which has been taking a long time  This is also in the context of recent rotator cuff tear, discomfort, and contemplating having rotator cuff repair.

## 2024-03-22 ENCOUNTER — OFFICE VISIT (OUTPATIENT)
Dept: OBGYN CLINIC | Facility: CLINIC | Age: 69
End: 2024-03-22
Payer: MEDICARE

## 2024-03-22 VITALS
HEIGHT: 67 IN | BODY MASS INDEX: 24.84 KG/M2 | WEIGHT: 158.3 LBS | RESPIRATION RATE: 17 BRPM | DIASTOLIC BLOOD PRESSURE: 83 MMHG | SYSTOLIC BLOOD PRESSURE: 166 MMHG | HEART RATE: 75 BPM

## 2024-03-22 DIAGNOSIS — M25.511 RIGHT SHOULDER PAIN, UNSPECIFIED CHRONICITY: ICD-10-CM

## 2024-03-22 DIAGNOSIS — S46.011D TRAUMATIC TEAR OF RIGHT ROTATOR CUFF, UNSPECIFIED TEAR EXTENT, SUBSEQUENT ENCOUNTER: ICD-10-CM

## 2024-03-22 PROCEDURE — 99214 OFFICE O/P EST MOD 30 MIN: CPT | Performed by: STUDENT IN AN ORGANIZED HEALTH CARE EDUCATION/TRAINING PROGRAM

## 2024-03-22 RX ORDER — CHLORHEXIDINE GLUCONATE ORAL RINSE 1.2 MG/ML
15 SOLUTION DENTAL ONCE
OUTPATIENT
Start: 2024-03-22 | End: 2024-03-22

## 2024-03-22 NOTE — H&P (VIEW-ONLY)
Ortho Sports Medicine Shoulder New Patient Visit     Assesment:   68 y.o. female right shoulder full-thickness supraspinatus tear    Plan:  The patient's diagnosis and treatment were discussed at length today. We discussed no treatment, non-operative treatment, and operative treatment.    Melita presents today for initial evaluation right shoulder rotator cuff tear.  Given her active lifestyle and failed conservative treatment through shared decision making we did agree to proceed forward with surgical intervention.  I discussed above this would involve a right shoulder arthroscopy with rotator cuff repair and all associated procedures.  All risks and benefits were reviewed and surgical consent was signed at today's visit.  I did provide her with a sling with a pillow.  We did have a lengthy discussion on questions which were all answered to her satisfaction.  I discussed with her she can continue to perform her home exercise program as tolerated using pain as guide.  She can continue to form activity as tolerated using pain as a guide.  She is to follow-up approximately 5 to 7 days postoperatively.    Given that the patient has failed conservative treatment and continues to have severe pain and/or dysfunction that limits their activities of daily living, they would like to proceed with operative intervention. We discussed with the patient the risks of no treatment, non-operative treatment, and operative treatment. The risks of operative intervention were discussed and include but are not limited to: Infection, bleeding, stiffness, loss of range of motion, blood clot, failure of surgery, fracture, delayed union, nonunion, malunion, risk of potential future arthritis, continued problems with swelling, injury to surrounding structures/nerve/artery/vein, recurrence of cysts, failure of hardware, retained hardware and/or foreign body, symptomatic hardware, and continued instability, pain, dysfunction, or disability  despite repair.       Conservative treatment:    Ice to shoulder 1-2 times daily, for 20 minutes at a time.  Home exercise program for shoulder, including ROM and strenthening.  Instructions given to patient of what exercises to perform.  Let pain guide return to activities.  Sling with pillow provided at today's visit.  Contact information for Juliet Shetty to discuss possible ice machine provided at today's visit    Imaging:    All imaging from today was reviewed by myself and explained to the patient.       Injection:    No Injection planned at this time.      Surgery:     All of the risks and benefits of operative treatment were explained to the patient, as well as the risks and benefits of any alternative treatment options, including nonoperative care. This risks of surgery include, but are not limited to, infection, bleeding, blood clot, damage to nerves/arteries, need for further surgyer, cardiovascular risk, anesthesia risk, and continued pain.  The patient understood this and elects to proceed forward with surgical intervention.  We will proceed forward with right shoulder arthroscopy with rotator cuff repair, possible subacromial decompression, and possible biceps tenodesis versus tenotomy.      Follow up:    Return for follow up 5-7 days post-op.        Chief Complaint   Patient presents with    Right Shoulder - Pain       History of Present Illness:    The patient is a 68 y.o., right hand dominant female whose occupation is retired, referred to me by Dr. Gómez, seen in clinic for consultation of right shoulder pain.  She states that she has been having ongoing right shoulder pain now for several months that originally began while playing pickle ball.  She stated originally thought that it was muscle soreness, however the pain never subsided after period of rest.  She originally saw Dr. Gómez who provided her with a referral to outpatient physical therapy which she attended for 8+ weeks without any  significant improvement of her symptoms.  She states that she continues to have anterior lateral shoulder pain that does not radiate past her elbow.  She describes her pain as dull and achy and rates it a 5 out of 10 at rest.  She states that repetitive overhead motion does exacerbate her symptoms which makes activities of daily living difficult to perform.  She states that her pain does keep her up at night.  She denies any previous history of injury or trauma to her shoulder.  She denies any instability.  She denies any numbness or tingling.           Shoulder Surgical History:  None    Past Medical, Social and Family History:  Past Medical History:   Diagnosis Date    Anxiety     Arthritis     Heart valve disease     Mitro valve prolapse    Hypertension     Psoriasis     Psoriatic arthritis (HCC)     Varicella      Past Surgical History:   Procedure Laterality Date    JOINT REPLACEMENT Left 2016    LEFT OOPHORECTOMY Left 2007    benign cyst    OOPHORECTOMY      REPLACEMENT TOTAL KNEE Left      Allergies   Allergen Reactions    Albumen, Egg - Food Allergy Other (See Comments)    Casein - Food Allergy Other (See Comments)    Gluten Meal - Food Allergy Other (See Comments)    Milk-Related Compounds - Food Allergy Other (See Comments)    Sulfa Antibiotics Hives     Current Outpatient Medications on File Prior to Visit   Medication Sig Dispense Refill    Cholecalciferol (Vitamin D3) 50 MCG (2000 UT) capsule       dicyclomine (BENTYL) 10 mg capsule TAKE 1 CAPSULE BY MOUTH 3 TIMES A DAY BEFORE MEALS 270 capsule 1    fluticasone (FLONASE) 50 mcg/act nasal spray SPRAY 2 SPRAYS INTO EACH NOSTRIL EVERY DAY (Patient taking differently: if needed) 48 mL 1    guselkumab (TREMFYA) subcutaneous injection Inject 100 mg under the skin every 2 (two) months Medication on hold until insurance clearance      psyllium (METAMUCIL) 58.6 % packet Take 1 packet by mouth 2 (two) times a day      Tiadylt  MG 24 hr capsule TAKE 1  CAPSULE BY MOUTH EVERY DAY 90 capsule 3    traZODone (DESYREL) 50 mg tablet TAKE 0.5 TABLETS (25 MG TOTAL) BY MOUTH DAILY AT BEDTIME AS NEEDED FOR SLEEP 45 tablet 3    Turmeric 400 MG CAPS Take by mouth 2 (two) times a day      CALCIUM PO Take by mouth (Patient not taking: Reported on 3/19/2024)       No current facility-administered medications on file prior to visit.     Social History     Socioeconomic History    Marital status:      Spouse name: Not on file    Number of children: Not on file    Years of education: Not on file    Highest education level: Not on file   Occupational History    Occupation: retired   Tobacco Use    Smoking status: Never    Smokeless tobacco: Never   Vaping Use    Vaping status: Never Used   Substance and Sexual Activity    Alcohol use: Not Currently     Alcohol/week: 1.0 standard drink of alcohol     Types: 1 Glasses of wine per week     Comment: With dinner    Drug use: Not Currently     Types: Marijuana     Comment: Many years ago    Sexual activity: Not Currently     Partners: Male     Birth control/protection: Post-menopausal     Comment: Not active in a long time.  No partners, no interest   Other Topics Concern    Not on file   Social History Narrative    Not on file     Social Determinants of Health     Financial Resource Strain: Low Risk  (1/19/2024)    Overall Financial Resource Strain (CARDIA)     Difficulty of Paying Living Expenses: Not hard at all   Food Insecurity: Not on file   Transportation Needs: No Transportation Needs (1/19/2024)    PRAPARE - Transportation     Lack of Transportation (Medical): No     Lack of Transportation (Non-Medical): No   Physical Activity: Not on file   Stress: Not on file   Social Connections: Not on file   Intimate Partner Violence: Not on file   Housing Stability: Not on file         I have reviewed the past medical, surgical, social and family history, medications and allergies as documented in the EMR.    Review of systems: DAVID  "is negative other than that noted in the HPI.  Constitutional: Negative for fatigue and fever.   HENT: Negative for sore throat.    Respiratory: Negative for shortness of breath.    Cardiovascular: Negative for chest pain.   Gastrointestinal: Negative for abdominal pain.   Endocrine: Negative for cold intolerance and heat intolerance.   Genitourinary: Negative for flank pain.   Musculoskeletal: Negative for back pain.   Skin: Negative for rash.   Allergic/Immunologic: Negative for immunocompromised state.   Neurological: Negative for dizziness.   Psychiatric/Behavioral: Negative for agitation.      Physical Exam:    Blood pressure 166/83, pulse 75, resp. rate 17, height 5' 6.5\" (1.689 m), weight 71.8 kg (158 lb 4.8 oz), not currently breastfeeding.    General/Constitutional: NAD, well developed, well nourished  HENT: Normocephalic, atraumatic  CV: Intact distal pulses, regular rate  Resp: No respiratory distress or labored breathing  Lymphatic: No lymphadenopathy palpated  Neuro: Alert and Oriented x 3, no focal deficits  Psych: Normal mood, normal affect, normal judgement, normal behavior  Skin: Warm, dry, no rashes, no erythema      Shoulder focused exam:     Visual inspection of the shoulder demonstrates normal contour without atrophy  No evidence of scapular dyskinesia or atrophy.  No scapular winging  Active and passive range of motion demonstrates forward flexion to 160, abduction to 90,  external rotation is 60 with the arm the side, internal rotation to T10   Rotator cuff strength is 4/5 to resisted forward flexion, 4/5 resisted abduction, 4/5 resisted external rotation, 5/5 resisted internal rotation  No tenderness to palpation at the distal clavicle, AC joint, acromion, or scapular spine  No pain with cross-body adduction  - Neer's test, - modified Kirby impingement test  Negative external rotation lag sign  Negative belly press, negative lift-off  + speed's and Yergason's test  + tenderness to palpation " at the bicipital groove  - [place]'s test        UE NV Exam: +2 Radial pulses bilaterally  Sensation intact to light touch C5-T1 bilaterally, Radial/median/ulnar nerve motor intact      Bilateral elbow, wrist, and and forearm ROM full, painless with passive ROM, no ttp or crepitance throughout extremities below shoulder joint    Cervical ROM is full without pain, numbness or tingling      Shoulder Imaging    X-rays of the right shoulder were reviewed, which demonstrate no acute osseous abnormalities with mild degenerative changes.  I have reviewed the radiology report and agree with their impression.    MRI of the right shoulder were reviewed, which demonstrate full-thickness tear posterior supraspinatus tendon with retraction to apex of humeral head.  Mild long head biceps tenodesis without evidence of tear.  Mild degenerative changes.  Subscapularis appears intact.  I have reviewed the radiology report and agree with their impression.      Scribe Attestation      I,:  Rigo Olivia am acting as a scribe while in the presence of the attending physician.:       I,:  Joseph Larkin, DO personally performed the services described in this documentation    as scribed in my presence.:

## 2024-03-22 NOTE — PROGRESS NOTES
Ortho Sports Medicine Shoulder New Patient Visit     Assesment:   68 y.o. female right shoulder full-thickness supraspinatus tear    Plan:  The patient's diagnosis and treatment were discussed at length today. We discussed no treatment, non-operative treatment, and operative treatment.    Melita presents today for initial evaluation right shoulder rotator cuff tear.  Given her active lifestyle and failed conservative treatment through shared decision making we did agree to proceed forward with surgical intervention.  I discussed above this would involve a right shoulder arthroscopy with rotator cuff repair and all associated procedures.  All risks and benefits were reviewed and surgical consent was signed at today's visit.  I did provide her with a sling with a pillow.  We did have a lengthy discussion on questions which were all answered to her satisfaction.  I discussed with her she can continue to perform her home exercise program as tolerated using pain as guide.  She can continue to form activity as tolerated using pain as a guide.  She is to follow-up approximately 5 to 7 days postoperatively.    Given that the patient has failed conservative treatment and continues to have severe pain and/or dysfunction that limits their activities of daily living, they would like to proceed with operative intervention. We discussed with the patient the risks of no treatment, non-operative treatment, and operative treatment. The risks of operative intervention were discussed and include but are not limited to: Infection, bleeding, stiffness, loss of range of motion, blood clot, failure of surgery, fracture, delayed union, nonunion, malunion, risk of potential future arthritis, continued problems with swelling, injury to surrounding structures/nerve/artery/vein, recurrence of cysts, failure of hardware, retained hardware and/or foreign body, symptomatic hardware, and continued instability, pain, dysfunction, or disability  despite repair.       Conservative treatment:    Ice to shoulder 1-2 times daily, for 20 minutes at a time.  Home exercise program for shoulder, including ROM and strenthening.  Instructions given to patient of what exercises to perform.  Let pain guide return to activities.  Sling with pillow provided at today's visit.  Contact information for Juliet Shetty to discuss possible ice machine provided at today's visit    Imaging:    All imaging from today was reviewed by myself and explained to the patient.       Injection:    No Injection planned at this time.      Surgery:     All of the risks and benefits of operative treatment were explained to the patient, as well as the risks and benefits of any alternative treatment options, including nonoperative care. This risks of surgery include, but are not limited to, infection, bleeding, blood clot, damage to nerves/arteries, need for further surgyer, cardiovascular risk, anesthesia risk, and continued pain.  The patient understood this and elects to proceed forward with surgical intervention.  We will proceed forward with right shoulder arthroscopy with rotator cuff repair, possible subacromial decompression, and possible biceps tenodesis versus tenotomy.      Follow up:    Return for follow up 5-7 days post-op.        Chief Complaint   Patient presents with    Right Shoulder - Pain       History of Present Illness:    The patient is a 68 y.o., right hand dominant female whose occupation is retired, referred to me by Dr. Gómez, seen in clinic for consultation of right shoulder pain.  She states that she has been having ongoing right shoulder pain now for several months that originally began while playing pickle ball.  She stated originally thought that it was muscle soreness, however the pain never subsided after period of rest.  She originally saw Dr. Gómez who provided her with a referral to outpatient physical therapy which she attended for 8+ weeks without any  significant improvement of her symptoms.  She states that she continues to have anterior lateral shoulder pain that does not radiate past her elbow.  She describes her pain as dull and achy and rates it a 5 out of 10 at rest.  She states that repetitive overhead motion does exacerbate her symptoms which makes activities of daily living difficult to perform.  She states that her pain does keep her up at night.  She denies any previous history of injury or trauma to her shoulder.  She denies any instability.  She denies any numbness or tingling.           Shoulder Surgical History:  None    Past Medical, Social and Family History:  Past Medical History:   Diagnosis Date    Anxiety     Arthritis     Heart valve disease     Mitro valve prolapse    Hypertension     Psoriasis     Psoriatic arthritis (HCC)     Varicella      Past Surgical History:   Procedure Laterality Date    JOINT REPLACEMENT Left 2016    LEFT OOPHORECTOMY Left 2007    benign cyst    OOPHORECTOMY      REPLACEMENT TOTAL KNEE Left      Allergies   Allergen Reactions    Albumen, Egg - Food Allergy Other (See Comments)    Casein - Food Allergy Other (See Comments)    Gluten Meal - Food Allergy Other (See Comments)    Milk-Related Compounds - Food Allergy Other (See Comments)    Sulfa Antibiotics Hives     Current Outpatient Medications on File Prior to Visit   Medication Sig Dispense Refill    Cholecalciferol (Vitamin D3) 50 MCG (2000 UT) capsule       dicyclomine (BENTYL) 10 mg capsule TAKE 1 CAPSULE BY MOUTH 3 TIMES A DAY BEFORE MEALS 270 capsule 1    fluticasone (FLONASE) 50 mcg/act nasal spray SPRAY 2 SPRAYS INTO EACH NOSTRIL EVERY DAY (Patient taking differently: if needed) 48 mL 1    guselkumab (TREMFYA) subcutaneous injection Inject 100 mg under the skin every 2 (two) months Medication on hold until insurance clearance      psyllium (METAMUCIL) 58.6 % packet Take 1 packet by mouth 2 (two) times a day      Tiadylt  MG 24 hr capsule TAKE 1  CAPSULE BY MOUTH EVERY DAY 90 capsule 3    traZODone (DESYREL) 50 mg tablet TAKE 0.5 TABLETS (25 MG TOTAL) BY MOUTH DAILY AT BEDTIME AS NEEDED FOR SLEEP 45 tablet 3    Turmeric 400 MG CAPS Take by mouth 2 (two) times a day      CALCIUM PO Take by mouth (Patient not taking: Reported on 3/19/2024)       No current facility-administered medications on file prior to visit.     Social History     Socioeconomic History    Marital status:      Spouse name: Not on file    Number of children: Not on file    Years of education: Not on file    Highest education level: Not on file   Occupational History    Occupation: retired   Tobacco Use    Smoking status: Never    Smokeless tobacco: Never   Vaping Use    Vaping status: Never Used   Substance and Sexual Activity    Alcohol use: Not Currently     Alcohol/week: 1.0 standard drink of alcohol     Types: 1 Glasses of wine per week     Comment: With dinner    Drug use: Not Currently     Types: Marijuana     Comment: Many years ago    Sexual activity: Not Currently     Partners: Male     Birth control/protection: Post-menopausal     Comment: Not active in a long time.  No partners, no interest   Other Topics Concern    Not on file   Social History Narrative    Not on file     Social Determinants of Health     Financial Resource Strain: Low Risk  (1/19/2024)    Overall Financial Resource Strain (CARDIA)     Difficulty of Paying Living Expenses: Not hard at all   Food Insecurity: Not on file   Transportation Needs: No Transportation Needs (1/19/2024)    PRAPARE - Transportation     Lack of Transportation (Medical): No     Lack of Transportation (Non-Medical): No   Physical Activity: Not on file   Stress: Not on file   Social Connections: Not on file   Intimate Partner Violence: Not on file   Housing Stability: Not on file         I have reviewed the past medical, surgical, social and family history, medications and allergies as documented in the EMR.    Review of systems: DAVID  "is negative other than that noted in the HPI.  Constitutional: Negative for fatigue and fever.   HENT: Negative for sore throat.    Respiratory: Negative for shortness of breath.    Cardiovascular: Negative for chest pain.   Gastrointestinal: Negative for abdominal pain.   Endocrine: Negative for cold intolerance and heat intolerance.   Genitourinary: Negative for flank pain.   Musculoskeletal: Negative for back pain.   Skin: Negative for rash.   Allergic/Immunologic: Negative for immunocompromised state.   Neurological: Negative for dizziness.   Psychiatric/Behavioral: Negative for agitation.      Physical Exam:    Blood pressure 166/83, pulse 75, resp. rate 17, height 5' 6.5\" (1.689 m), weight 71.8 kg (158 lb 4.8 oz), not currently breastfeeding.    General/Constitutional: NAD, well developed, well nourished  HENT: Normocephalic, atraumatic  CV: Intact distal pulses, regular rate  Resp: No respiratory distress or labored breathing  Lymphatic: No lymphadenopathy palpated  Neuro: Alert and Oriented x 3, no focal deficits  Psych: Normal mood, normal affect, normal judgement, normal behavior  Skin: Warm, dry, no rashes, no erythema      Shoulder focused exam:     Visual inspection of the shoulder demonstrates normal contour without atrophy  No evidence of scapular dyskinesia or atrophy.  No scapular winging  Active and passive range of motion demonstrates forward flexion to 160, abduction to 90,  external rotation is 60 with the arm the side, internal rotation to T10   Rotator cuff strength is 4/5 to resisted forward flexion, 4/5 resisted abduction, 4/5 resisted external rotation, 5/5 resisted internal rotation  No tenderness to palpation at the distal clavicle, AC joint, acromion, or scapular spine  No pain with cross-body adduction  - Neer's test, - modified Kirby impingement test  Negative external rotation lag sign  Negative belly press, negative lift-off  + speed's and Yergason's test  + tenderness to palpation " at the bicipital groove  - [place]'s test        UE NV Exam: +2 Radial pulses bilaterally  Sensation intact to light touch C5-T1 bilaterally, Radial/median/ulnar nerve motor intact      Bilateral elbow, wrist, and and forearm ROM full, painless with passive ROM, no ttp or crepitance throughout extremities below shoulder joint    Cervical ROM is full without pain, numbness or tingling      Shoulder Imaging    X-rays of the right shoulder were reviewed, which demonstrate no acute osseous abnormalities with mild degenerative changes.  I have reviewed the radiology report and agree with their impression.    MRI of the right shoulder were reviewed, which demonstrate full-thickness tear posterior supraspinatus tendon with retraction to apex of humeral head.  Mild long head biceps tenodesis without evidence of tear.  Mild degenerative changes.  Subscapularis appears intact.  I have reviewed the radiology report and agree with their impression.      Scribe Attestation      I,:  Rigo Olivia am acting as a scribe while in the presence of the attending physician.:       I,:  Joseph Larkin, DO personally performed the services described in this documentation    as scribed in my presence.:

## 2024-03-22 NOTE — LETTER
March 22, 2024     Aranza Gómez MD  [address]  [address]  [facility] [address]    Patient: Rosalind Guerrero   YOB: 1955   Date of Visit: 3/22/2024       Dear Dr. Gómez:    Thank you for referring Rosalind Guerrero to me for evaluation. Below are my notes for this consultation.    If you have questions, please do not hesitate to call me. I look forward to following your patient along with you.         Sincerely,        Joseph Larkin DO        CC: No Recipients    Joseph Larkin DO  3/22/2024  2:20 PM  Sign when Signing Visit  Ortho Sports Medicine Shoulder New Patient Visit     Assesment:   68 y.o. female right shoulder full-thickness supraspinatus tear    Plan:  The patient's diagnosis and treatment were discussed at length today. We discussed no treatment, non-operative treatment, and operative treatment.    Melita presents today for initial evaluation right shoulder rotator cuff tear.  Given her active lifestyle and failed conservative treatment through shared decision making we did agree to proceed forward with surgical intervention.  I discussed above this would involve a right shoulder arthroscopy with rotator cuff repair and all associated procedures.  All risks and benefits were reviewed and surgical consent was signed at today's visit.  I did provide her with a sling with a pillow.  We did have a lengthy discussion on questions which were all answered to her satisfaction.  I discussed with her she can continue to perform her home exercise program as tolerated using pain as guide.  She can continue to form activity as tolerated using pain as a guide.  She is to follow-up approximately 5 to 7 days postoperatively.    Given that the patient has failed conservative treatment and continues to have severe pain and/or dysfunction that limits their activities of daily living, they would like to proceed with operative intervention. We discussed with the patient the risks of no treatment,  non-operative treatment, and operative treatment. The risks of operative intervention were discussed and include but are not limited to: Infection, bleeding, stiffness, loss of range of motion, blood clot, failure of surgery, fracture, delayed union, nonunion, malunion, risk of potential future arthritis, continued problems with swelling, injury to surrounding structures/nerve/artery/vein, recurrence of cysts, failure of hardware, retained hardware and/or foreign body, symptomatic hardware, and continued instability, pain, dysfunction, or disability despite repair.       Conservative treatment:    Ice to shoulder 1-2 times daily, for 20 minutes at a time.  Home exercise program for shoulder, including ROM and strenthening.  Instructions given to patient of what exercises to perform.  Let pain guide return to activities.  Sling with pillow provided at today's visit.  Contact information for Juliet Shetty to discuss possible ice machine provided at today's visit    Imaging:    All imaging from today was reviewed by myself and explained to the patient.       Injection:    No Injection planned at this time.      Surgery:     All of the risks and benefits of operative treatment were explained to the patient, as well as the risks and benefits of any alternative treatment options, including nonoperative care. This risks of surgery include, but are not limited to, infection, bleeding, blood clot, damage to nerves/arteries, need for further surgyer, cardiovascular risk, anesthesia risk, and continued pain.  The patient understood this and elects to proceed forward with surgical intervention.  We will proceed forward with right shoulder arthroscopy with rotator cuff repair, possible subacromial decompression, and possible biceps tenodesis versus tenotomy.      Follow up:    Return for follow up 5-7 days post-op.        Chief Complaint   Patient presents with   • Right Shoulder - Pain       History of Present  Illness:    The patient is a 68 y.o., right hand dominant female whose occupation is retired, referred to me by Dr. Gómez, seen in clinic for consultation of right shoulder pain.  She states that she has been having ongoing right shoulder pain now for several months that originally began while playing pickle ball.  She stated originally thought that it was muscle soreness, however the pain never subsided after period of rest.  She originally saw Dr. Gómez who provided her with a referral to outpatient physical therapy which she attended for 8+ weeks without any significant improvement of her symptoms.  She states that she continues to have anterior lateral shoulder pain that does not radiate past her elbow.  She describes her pain as dull and achy and rates it a 5 out of 10 at rest.  She states that repetitive overhead motion does exacerbate her symptoms which makes activities of daily living difficult to perform.  She states that her pain does keep her up at night.  She denies any previous history of injury or trauma to her shoulder.  She denies any instability.  She denies any numbness or tingling.           Shoulder Surgical History:  None    Past Medical, Social and Family History:  Past Medical History:   Diagnosis Date   • Anxiety    • Arthritis    • Heart valve disease     Mitro valve prolapse   • Hypertension    • Psoriasis    • Psoriatic arthritis (HCC)    • Varicella      Past Surgical History:   Procedure Laterality Date   • JOINT REPLACEMENT Left 2016   • LEFT OOPHORECTOMY Left 2007    benign cyst   • OOPHORECTOMY     • REPLACEMENT TOTAL KNEE Left      Allergies   Allergen Reactions   • Albumen, Egg - Food Allergy Other (See Comments)   • Casein - Food Allergy Other (See Comments)   • Gluten Meal - Food Allergy Other (See Comments)   • Milk-Related Compounds - Food Allergy Other (See Comments)   • Sulfa Antibiotics Hives     Current Outpatient Medications on File Prior to Visit   Medication Sig  Dispense Refill   • Cholecalciferol (Vitamin D3) 50 MCG (2000 UT) capsule      • dicyclomine (BENTYL) 10 mg capsule TAKE 1 CAPSULE BY MOUTH 3 TIMES A DAY BEFORE MEALS 270 capsule 1   • fluticasone (FLONASE) 50 mcg/act nasal spray SPRAY 2 SPRAYS INTO EACH NOSTRIL EVERY DAY (Patient taking differently: if needed) 48 mL 1   • guselkumab (TREMFYA) subcutaneous injection Inject 100 mg under the skin every 2 (two) months Medication on hold until insurance clearance     • psyllium (METAMUCIL) 58.6 % packet Take 1 packet by mouth 2 (two) times a day     • Tiadylt  MG 24 hr capsule TAKE 1 CAPSULE BY MOUTH EVERY DAY 90 capsule 3   • traZODone (DESYREL) 50 mg tablet TAKE 0.5 TABLETS (25 MG TOTAL) BY MOUTH DAILY AT BEDTIME AS NEEDED FOR SLEEP 45 tablet 3   • Turmeric 400 MG CAPS Take by mouth 2 (two) times a day     • CALCIUM PO Take by mouth (Patient not taking: Reported on 3/19/2024)       No current facility-administered medications on file prior to visit.     Social History     Socioeconomic History   • Marital status:      Spouse name: Not on file   • Number of children: Not on file   • Years of education: Not on file   • Highest education level: Not on file   Occupational History   • Occupation: retired   Tobacco Use   • Smoking status: Never   • Smokeless tobacco: Never   Vaping Use   • Vaping status: Never Used   Substance and Sexual Activity   • Alcohol use: Not Currently     Alcohol/week: 1.0 standard drink of alcohol     Types: 1 Glasses of wine per week     Comment: With dinner   • Drug use: Not Currently     Types: Marijuana     Comment: Many years ago   • Sexual activity: Not Currently     Partners: Male     Birth control/protection: Post-menopausal     Comment: Not active in a long time.  No partners, no interest   Other Topics Concern   • Not on file   Social History Narrative   • Not on file     Social Determinants of Health     Financial Resource Strain: Low Risk  (1/19/2024)    Overall Financial  "Resource Strain (CARDIA)    • Difficulty of Paying Living Expenses: Not hard at all   Food Insecurity: Not on file   Transportation Needs: No Transportation Needs (1/19/2024)    PRAPARE - Transportation    • Lack of Transportation (Medical): No    • Lack of Transportation (Non-Medical): No   Physical Activity: Not on file   Stress: Not on file   Social Connections: Not on file   Intimate Partner Violence: Not on file   Housing Stability: Not on file         I have reviewed the past medical, surgical, social and family history, medications and allergies as documented in the EMR.    Review of systems: ROS is negative other than that noted in the HPI.  Constitutional: Negative for fatigue and fever.   HENT: Negative for sore throat.    Respiratory: Negative for shortness of breath.    Cardiovascular: Negative for chest pain.   Gastrointestinal: Negative for abdominal pain.   Endocrine: Negative for cold intolerance and heat intolerance.   Genitourinary: Negative for flank pain.   Musculoskeletal: Negative for back pain.   Skin: Negative for rash.   Allergic/Immunologic: Negative for immunocompromised state.   Neurological: Negative for dizziness.   Psychiatric/Behavioral: Negative for agitation.      Physical Exam:    Blood pressure 166/83, pulse 75, resp. rate 17, height 5' 6.5\" (1.689 m), weight 71.8 kg (158 lb 4.8 oz), not currently breastfeeding.    General/Constitutional: NAD, well developed, well nourished  HENT: Normocephalic, atraumatic  CV: Intact distal pulses, regular rate  Resp: No respiratory distress or labored breathing  Lymphatic: No lymphadenopathy palpated  Neuro: Alert and Oriented x 3, no focal deficits  Psych: Normal mood, normal affect, normal judgement, normal behavior  Skin: Warm, dry, no rashes, no erythema      Shoulder focused exam:     Visual inspection of the shoulder demonstrates normal contour without atrophy  No evidence of scapular dyskinesia or atrophy.  No scapular winging  Active and " passive range of motion demonstrates forward flexion to 160, abduction to 90,  external rotation is 60 with the arm the side, internal rotation to T10   Rotator cuff strength is 4/5 to resisted forward flexion, 4/5 resisted abduction, 4/5 resisted external rotation, 5/5 resisted internal rotation  No tenderness to palpation at the distal clavicle, AC joint, acromion, or scapular spine  No pain with cross-body adduction  - Neer's test, - modified Kirby impingement test  Negative external rotation lag sign  Negative belly press, negative lift-off  + speed's and Yergason's test  + tenderness to palpation at the bicipital groove  - [place]'s test        UE NV Exam: +2 Radial pulses bilaterally  Sensation intact to light touch C5-T1 bilaterally, Radial/median/ulnar nerve motor intact      Bilateral elbow, wrist, and and forearm ROM full, painless with passive ROM, no ttp or crepitance throughout extremities below shoulder joint    Cervical ROM is full without pain, numbness or tingling      Shoulder Imaging    X-rays of the right shoulder were reviewed, which demonstrate no acute osseous abnormalities with mild degenerative changes.  I have reviewed the radiology report and agree with their impression.    MRI of the right shoulder were reviewed, which demonstrate full-thickness tear posterior supraspinatus tendon with retraction to apex of humeral head.  Mild long head biceps tenodesis without evidence of tear.  Mild degenerative changes.  Subscapularis appears intact.  I have reviewed the radiology report and agree with their impression.      Scribe Attestation      I,:  Rigo Olivia am acting as a scribe while in the presence of the attending physician.:       I,:  Joseph Larkin DO personally performed the services described in this documentation    as scribed in my presence.:

## 2024-03-22 NOTE — LETTER
March 22, 2024     Patient: Rosalind Guerrero  YOB: 1955  Date of Visit: 3/22/2024      To Whom it May Concern:    Rosalind Guerrero is under my professional care. Rosalind was seen in my office on 3/22/2024. Rosalind will be undergoing right shoulder surgery, please pause her gym membership as she is medically unable to participate in sports activity.    If you have any questions or concerns, please don't hesitate to call.         Sincerely,          Joseph Larkin,         CC: No Recipients

## 2024-03-26 ENCOUNTER — TELEPHONE (OUTPATIENT)
Age: 69
End: 2024-03-26

## 2024-03-26 NOTE — TELEPHONE ENCOUNTER
Caller: patient    Doctor: yumiko    Reason for call: patient has some questions for after surgery & does not want to wait until day of surgery to get the answers      1.When should patient schedule appointment with rehab?  2.patient would like to know what she should be sleeping on after surgery ex. Recliner and would she need a wedge and what size wedge for bed?  3.Has questions about pain medication, when does she get the medication and has questions about taking tylenol and aleeve with oxycodone?  4.Should patient rent a cooler ice machine? And if so can she leave it on over night?  Please advise     Call back#: [phone]

## 2024-04-01 ENCOUNTER — ANESTHESIA EVENT (OUTPATIENT)
Dept: PERIOP | Facility: AMBULARY SURGERY CENTER | Age: 69
End: 2024-04-01
Payer: MEDICARE

## 2024-04-01 ENCOUNTER — TELEPHONE (OUTPATIENT)
Dept: OBGYN CLINIC | Facility: CLINIC | Age: 69
End: 2024-04-01

## 2024-04-02 ENCOUNTER — TELEPHONE (OUTPATIENT)
Dept: OBGYN CLINIC | Facility: CLINIC | Age: 69
End: 2024-04-02

## 2024-04-03 ENCOUNTER — CONSULT (OUTPATIENT)
Dept: FAMILY MEDICINE CLINIC | Facility: CLINIC | Age: 69
End: 2024-04-03
Payer: MEDICARE

## 2024-04-03 ENCOUNTER — TELEPHONE (OUTPATIENT)
Dept: FAMILY MEDICINE CLINIC | Facility: CLINIC | Age: 69
End: 2024-04-03

## 2024-04-03 VITALS
RESPIRATION RATE: 16 BRPM | DIASTOLIC BLOOD PRESSURE: 100 MMHG | SYSTOLIC BLOOD PRESSURE: 160 MMHG | TEMPERATURE: 97.8 F | WEIGHT: 157 LBS | HEIGHT: 67 IN | HEART RATE: 70 BPM | OXYGEN SATURATION: 97 % | BODY MASS INDEX: 24.64 KG/M2

## 2024-04-03 DIAGNOSIS — I10 HYPERTENSION, ESSENTIAL, BENIGN: Chronic | ICD-10-CM

## 2024-04-03 DIAGNOSIS — Z01.818 PRE-OPERATIVE EXAMINATION: Primary | ICD-10-CM

## 2024-04-03 DIAGNOSIS — S46.011D TRAUMATIC TEAR OF RIGHT ROTATOR CUFF, UNSPECIFIED TEAR EXTENT, SUBSEQUENT ENCOUNTER: ICD-10-CM

## 2024-04-03 DIAGNOSIS — L40.50 PSORIATIC ARTHRITIS (HCC): Chronic | ICD-10-CM

## 2024-04-03 DIAGNOSIS — M25.511 RIGHT SHOULDER PAIN, UNSPECIFIED CHRONICITY: ICD-10-CM

## 2024-04-03 PROCEDURE — G2211 COMPLEX E/M VISIT ADD ON: HCPCS | Performed by: NURSE PRACTITIONER

## 2024-04-03 PROCEDURE — 99214 OFFICE O/P EST MOD 30 MIN: CPT | Performed by: NURSE PRACTITIONER

## 2024-04-03 NOTE — PROGRESS NOTES
[facility] HEALTH MAINTENANCE OFFICE VISIT  [facility]    NAME: Rosalind Guerrero  AGE: 68 y.o. SEX: female  : 1955     DATE: 4/3/2024    Assessment and Plan     1. Right shoulder pain, unspecified chronicity  -     Ambulatory referral to [facility]    2. Traumatic tear of right rotator cuff, unspecified tear extent, subsequent encounter  -     Ambulatory referral to [facility]        Patient Counseling:   {LK Adult CPE Counseling List:60740}    Immunizations reviewed: { immunization CPE list:42849}  Discussed benefits of:  { Adult CPE Screening counselin}.  BMI Counseling: Body mass index is 24.96 kg/m². Discussed with patient's BMI with her. The BMI {VB BMI Counselin}    No follow-ups on file.        Chief Complaint     Chief Complaint   Patient presents with    Pre-op Exam       History of Present Illness     No bleeding or clotting   No anesthesai  No sickness  Palpitations with stress.     EVK and cardiology consult done.     Blood work     NO cp, sob, dizziness.   No edema.     Exam is normal  120-130/90'[facility]   Cardiology is going to change bp med after surgery    White coat htn        Well Adult Physical   Patient here for a comprehensive physical exam.      Diet and Physical Activity  Diet: {diet; well adult:90720}  Exercise: {exericse; well adult:14372}      Depression Screen  PHQ-2/9 Depression Screening              General Health  Hearing: {WELL ADULT HEARIN}  Vision: {vision; well adult:09477}  Dental: {dental; well adult:15344}    Reproductive Health  {LK Adult CPE Screening counselin}      The following portions of the patient's history were reviewed and updated as appropriate: allergies, current medications, past family history, past medical history, past social history, past surgical history and problem list.    Review of Systems     Review of Systems    Past Medical History     Past  Medical History:   Diagnosis Date    Anxiety     Arthritis     Heart valve disease     Mitro valve prolapse    Hypertension     Psoriasis     Psoriatic arthritis (HCC)     Varicella        Past Surgical History     Past Surgical History:   Procedure Laterality Date    JOINT REPLACEMENT Left 2016    LEFT OOPHORECTOMY Left 2007    benign cyst    OOPHORECTOMY      REPLACEMENT TOTAL KNEE Left        Social History     Social History     Socioeconomic History    Marital status:      Spouse name: None    Number of children: None    Years of education: None    Highest education level: None   Occupational History    Occupation: retired   Tobacco Use    Smoking status: Never    Smokeless tobacco: Never   Vaping Use    Vaping status: Never Used   Substance and Sexual Activity    Alcohol use: Not Currently     Alcohol/week: 1.0 standard drink of alcohol     Types: 1 Glasses of wine per week     Comment: With dinner    Drug use: Not Currently     Types: Marijuana     Comment: Many years ago    Sexual activity: Not Currently     Partners: Male     Birth control/protection: Post-menopausal     Comment: Not active in a long time.  No partners, no interest   Other Topics Concern    None   Social History Narrative    None     Social Determinants of Health     Financial Resource Strain: Low Risk  (1/19/2024)    Overall Financial Resource Strain (CARDIA)     Difficulty of Paying Living Expenses: Not hard at all   Food Insecurity: Not on file   Transportation Needs: No Transportation Needs (1/19/2024)    PRAPARE - Transportation     Lack of Transportation (Medical): No     Lack of Transportation (Non-Medical): No   Physical Activity: Not on file   Stress: Not on file   Social Connections: Not on file   Intimate Partner Violence: Not on file   Housing Stability: Not on file       Family History     Family History   Problem Relation Age of Onset    Stroke Mother     Supraventricular tachycardia Mother     Psoriasis Mother      Psoriatic Arthritis Mother     Hypertension Mother         Mother and father    Dementia Mother         Mother    Squamous cell carcinoma Mother         skin- unknown age    Hypertension Father     Heart disease Father     Heart attack Father     Kidney cancer Sister         20's    No Known Problems Sister     No Known Problems Daughter     Colon cancer Maternal Grandmother 71    Cancer Maternal Grandmother         Colon cancer    No Known Problems Maternal Grandfather     No Known Problems Paternal Grandmother     Prostate cancer Paternal Grandfather         90's    No Known Problems Maternal Aunt     No Known Problems Maternal Aunt        Current Medications       Current Outpatient Medications:     Cholecalciferol (Vitamin D3) 50 MCG (2000 UT) capsule, , Disp: , Rfl:     dicyclomine (BENTYL) 10 mg capsule, TAKE 1 CAPSULE BY MOUTH 3 TIMES A DAY BEFORE MEALS, Disp: 270 capsule, Rfl: 1    fluticasone (FLONASE) 50 mcg/act nasal spray, SPRAY 2 SPRAYS INTO EACH NOSTRIL EVERY DAY (Patient taking differently: if needed), Disp: 48 mL, Rfl: 1    guselkumab (TREMFYA) subcutaneous injection, Inject 100 mg under the skin every 2 (two) months Medication on hold until insurance clearance, Disp: , Rfl:     psyllium (METAMUCIL) 58.6 % packet, Take 1 packet by mouth 2 (two) times a day, Disp: , Rfl:     Tiadylt  MG 24 hr capsule, TAKE 1 CAPSULE BY MOUTH EVERY DAY, Disp: 90 capsule, Rfl: 3    traZODone (DESYREL) 50 mg tablet, TAKE 0.5 TABLETS (25 MG TOTAL) BY MOUTH DAILY AT BEDTIME AS NEEDED FOR SLEEP, Disp: 45 tablet, Rfl: 3    Turmeric 400 MG CAPS, Take by mouth 2 (two) times a day, Disp: , Rfl:     CALCIUM PO, Take by mouth (Patient not taking: Reported on 3/19/2024), Disp: , Rfl:      Allergies     Allergies   Allergen Reactions    Albumen, Egg - Food Allergy Other (See Comments)    Casein - Food Allergy Other (See Comments)    Gluten Meal - Food Allergy Other (See Comments)    Milk-Related Compounds - Food Allergy Other  "(See Comments)    Sulfa Antibiotics Hives       Objective     /100   Pulse 70   Temp 97.8 °F (36.6 °C) (Temporal)   Resp 16   Ht 5' 6.5\" (1.689 m)   Wt 71.2 kg (157 lb)   SpO2 97%   BMI 24.96 kg/m²      Physical Exam  Vitals and nursing note reviewed.   Constitutional:       General: She is not in acute distress.     Appearance: Normal appearance. She is well-developed. She is not ill-appearing.   HENT:      Head: Normocephalic and atraumatic.      Right Ear: There is impacted cerumen.      Left Ear: Tympanic membrane normal.      Nose: Nose normal.      Mouth/Throat:      Mouth: Mucous membranes are moist.      Pharynx: Oropharynx is clear.   Eyes:      Conjunctiva/sclera: Conjunctivae normal.      Pupils: Pupils are equal, round, and reactive to light.   Neck:      Thyroid: No thyromegaly.   Cardiovascular:      Rate and Rhythm: Normal rate and regular rhythm.      Heart sounds: No murmur heard.  Pulmonary:      Effort: Pulmonary effort is normal.      Breath sounds: Normal breath sounds.   Abdominal:      General: Bowel sounds are normal.      Palpations: Abdomen is soft.      Tenderness: There is no abdominal tenderness.   Musculoskeletal:      Cervical back: Normal range of motion and neck supple.      Right lower leg: No edema.      Left lower leg: No edema.   Lymphadenopathy:      Cervical: No cervical adenopathy.   Skin:     Findings: No rash.   Neurological:      Mental Status: She is alert and oriented to person, place, and time.   Psychiatric:         Mood and Affect: Mood normal.           No results found.        COREY Escobedo  [facility]  "

## 2024-04-03 NOTE — PROGRESS NOTES
[facility] PRACTICE OFFICE VISIT       NAME: Rosalind Guerrero  AGE: 68 y.o. SEX: female       : 1955        MRN: [id number]    Assessment and Plan   1. Pre-operative examination  Right shoulder arthroscopic rotator cuff repair on 4/15 with Dr. Larkin.     ECG completed 3/19/2024, normal ECG.  Blood work 24: unremarkable CBCD and CMP.   Received cardiology clearance from Dr. Moe.    Chronic health conditions are stable and she is acceptable risk to proceed with surgery as scheduled.     2. Right shoulder pain, unspecified chronicity  -     Ambulatory referral to [facility]  Right shoulder pain for last several months, failed conservative treatment.     MRI right shoulder 24:  SUBCUTANEOUS TISSUES: Normal     JOINT EFFUSION: There is a small glenohumeral joint effusion.     ACROMION PROCESS: Normal.     ROTATOR CUFF: There is insertional tendinosis of the subscapularis tendons without full-thickness tear.     There is a full-thickness tear of the posterior supraspinatus tendon at the humeral insertion extending posteriorly into the conjoint tendon. This measures 8 x 9 mm (AP by transverse). There is insertional tendinosis of the residual intact supraspinatus   and infraspinatus fibers. There is no significant muscle atrophy.     SUBACROMIAL/SUBDELTOID BURSA: There is mild subacromial/subdeltoid bursitis.     LONG HEAD OF BICEPS TENDON: There is mild tendinosis without tear.     GLENOID LABRUM: Intact.     GLENOHUMERAL JOINT: Mild degenerative changes with cartilage thinning.     ACROMIOCLAVICULAR JOINT:  There is mild osteoarthritis.     BONES: There are cystic changes in the greater tuberosity. There is no osseous edema to suggest fracture or contusion.     IMPRESSION:     1. Full-thickness tear of the posterior supraspinatus tendon at the humeral insertion extending into the conjoint tendon. Insertional tendinosis of the residual intact supraspinatus and infraspinatus tendon fibers. No muscle  atrophy.  2. Mild tendinosis of the long head biceps tendon without tear.  3. Mild degenerative changes.    3. Traumatic tear of right rotator cuff, unspecified tear extent, subsequent encounter  -     Ambulatory referral to Family Practice    4. Psoriatic arthritis (HCC)  Assessment & Plan:   Follows with dermatology, on Tremfya.      5. Hypertension, essential, benign  Assessment & Plan:  Known history of white coat hypertension. Home blood pressures run 120-130's/90's.   She follows with cardiology, and they plan to change her medications after her surgery is completed.           Chief Complaint     Chief Complaint   Patient presents with    Pre-op Exam       History of Present Illness     Rosalind Guerrero is a 68 year old female presenting today for pre-operative examination.     Planning for right shoulder arthroscopic rotator cuff repair on 4/15 with Dr. Larkin.     She is feeling well.     Has right shoulder pain for last several months that has not improved with conservative measures.     No personal or family problems of bleeding or clotting.  No personal of family problems with anesthesia.   No recent upper respiratory illnesses.  Palpitations with stress.     Denies chest pain, shortness of breath, dizziness.     Very anxious to be here today and very anxious about upcoming surgery.           Review of Systems   Review of Systems   Constitutional: Negative.    HENT: Negative.     Respiratory: Negative.  Negative for shortness of breath.    Cardiovascular:  Positive for palpitations. Negative for chest pain and leg swelling.   Gastrointestinal: Negative.    Genitourinary: Negative.    Musculoskeletal: Negative.    Skin: Negative.    Neurological: Negative.  Negative for dizziness and light-headedness.   Hematological: Negative.    Psychiatric/Behavioral: Negative.         I have reviewed the patient's medical history in detail; there are no changes to the history as noted in the electronic medical  record.    Patient Active Problem List   Diagnosis    Psoriatic arthritis (HCC)    Hypertension, essential, benign    Irritable bowel syndrome    Psychophysiological insomnia    Allergic rhinitis    Bilateral bunions    Dense breast tissue    Family history of kidney cancer    Family history of colon cancer    Family history of COPD (chronic obstructive pulmonary disease)    Family history of macular degeneration    History of partial knee replacement    Pure hypercholesterolemia    Restless leg syndrome    Osteopenia    Traumatic tear of right rotator cuff, unspecified tear extent, subsequent encounter      Past Medical History:   Diagnosis Date    Anxiety     Arthritis     Heart valve disease     Mitro valve prolapse    Hypertension     Psoriasis     Psoriatic arthritis (HCC)     Varicella       Past Surgical History:   Procedure Laterality Date    JOINT REPLACEMENT Left 2016    LEFT OOPHORECTOMY Left 2007    benign cyst    OOPHORECTOMY      REPLACEMENT TOTAL KNEE Left       Family History   Problem Relation Age of Onset    Stroke Mother     Supraventricular tachycardia Mother     Psoriasis Mother     Psoriatic Arthritis Mother     Hypertension Mother         Mother and father    Dementia Mother         Mother    Squamous cell carcinoma Mother         skin- unknown age    Hypertension Father     Heart disease Father     Heart attack Father     Kidney cancer Sister         20's    No Known Problems Sister     No Known Problems Daughter     Colon cancer Maternal Grandmother 71    Cancer Maternal Grandmother         Colon cancer    No Known Problems Maternal Grandfather     No Known Problems Paternal Grandmother     Prostate cancer Paternal Grandfather         90's    No Known Problems Maternal Aunt     No Known Problems Maternal Aunt       Social History     Socioeconomic History    Marital status:      Spouse name: Not on file    Number of children: Not on file    Years of education: Not on file    Highest  "education level: Not on file   Occupational History    Occupation: retired   Tobacco Use    Smoking status: Never    Smokeless tobacco: Never   Vaping Use    Vaping status: Never Used   Substance and Sexual Activity    Alcohol use: Not Currently     Alcohol/week: 1.0 standard drink of alcohol     Types: 1 Glasses of wine per week     Comment: With dinner    Drug use: Not Currently     Types: Marijuana     Comment: Many years ago    Sexual activity: Not Currently     Partners: Male     Birth control/protection: Post-menopausal     Comment: Not active in a long time.  No partners, no interest   Other Topics Concern    Not on file   Social History Narrative    Not on file     Social Determinants of Health     Financial Resource Strain: Low Risk  (1/19/2024)    Overall Financial Resource Strain (CARDIA)     Difficulty of Paying Living Expenses: Not hard at all   Food Insecurity: Not on file   Transportation Needs: No Transportation Needs (1/19/2024)    PRAPARE - Transportation     Lack of Transportation (Medical): No     Lack of Transportation (Non-Medical): No   Physical Activity: Not on file   Stress: Not on file   Social Connections: Not on file   Intimate Partner Violence: Not on file   Housing Stability: Not on file        Objective     Vitals:    04/03/24 1047   BP: 160/100   Pulse: 70   Resp: 16   Temp: 97.8 °F (36.6 °C)   TempSrc: Temporal   SpO2: 97%   Weight: 71.2 kg (157 lb)   Height: 5' 6.5\" (1.689 m)     Wt Readings from Last 3 Encounters:   04/03/24 71.2 kg (157 lb)   03/22/24 71.8 kg (158 lb 4.8 oz)   03/19/24 71.8 kg (158 lb 3.2 oz)     Physical Exam  Vitals and nursing note reviewed.   Constitutional:       General: She is not in acute distress.     Appearance: Normal appearance. She is not ill-appearing.   HENT:      Head: Atraumatic.      Right Ear: Tympanic membrane normal.      Left Ear: Tympanic membrane normal.      Nose: Nose normal. No congestion or rhinorrhea.      Mouth/Throat:      Mouth: " Mucous membranes are moist.      Comments: Clear post nasal drip  Eyes:      Conjunctiva/sclera: Conjunctivae normal.      Pupils: Pupils are equal, round, and reactive to light.   Cardiovascular:      Rate and Rhythm: Normal rate and regular rhythm.      Heart sounds: No murmur heard.  Pulmonary:      Effort: Pulmonary effort is normal.      Breath sounds: Normal breath sounds.   Abdominal:      General: Bowel sounds are normal.      Palpations: Abdomen is soft.      Tenderness: There is no abdominal tenderness.   Musculoskeletal:      Cervical back: Normal range of motion and neck supple.      Right lower leg: No edema.      Left lower leg: No edema.   Lymphadenopathy:      Cervical: No cervical adenopathy.   Skin:     General: Skin is warm and dry.      Findings: No rash.   Neurological:      Mental Status: She is alert.      Gait: Gait normal.   Psychiatric:         Mood and Affect: Mood normal.            ALLERGIES:  Allergies   Allergen Reactions    Albumen, Egg - Food Allergy Other (See Comments)    Casein - Food Allergy Other (See Comments)    Gluten Meal - Food Allergy Other (See Comments)    Milk-Related Compounds - Food Allergy Other (See Comments)    Sulfa Antibiotics Hives       Current Medications     Current Outpatient Medications   Medication Sig Dispense Refill    Cholecalciferol (Vitamin D3) 50 MCG (2000 UT) capsule       dicyclomine (BENTYL) 10 mg capsule TAKE 1 CAPSULE BY MOUTH 3 TIMES A DAY BEFORE MEALS 270 capsule 1    fluticasone (FLONASE) 50 mcg/act nasal spray SPRAY 2 SPRAYS INTO EACH NOSTRIL EVERY DAY (Patient taking differently: if needed) 48 mL 1    guselkumab (TREMFYA) subcutaneous injection Inject 100 mg under the skin every 2 (two) months Medication on hold until insurance clearance      psyllium (METAMUCIL) 58.6 % packet Take 1 packet by mouth 2 (two) times a day      Tiadylt  MG 24 hr capsule TAKE 1 CAPSULE BY MOUTH EVERY DAY 90 capsule 3    traZODone (DESYREL) 50 mg tablet  TAKE 0.5 TABLETS (25 MG TOTAL) BY MOUTH DAILY AT BEDTIME AS NEEDED FOR SLEEP 45 tablet 3    Turmeric 400 MG CAPS Take by mouth 2 (two) times a day       No current facility-administered medications for this visit.         Health Maintenance     Health Maintenance   Topic Date Due    Hepatitis C Screening  Never done    COVID-19 Vaccine (4 - 2023-24 season) 09/01/2023    PT PLAN OF CARE  04/06/2024    Depression Screening  01/25/2025    Urinary Incontinence Screening  01/25/2025    Medicare Annual Wellness Visit (AWV)  01/25/2025    Breast Cancer Screening: Mammogram  01/30/2025    Fall Risk  03/07/2025    Colorectal Cancer Screening  08/22/2028    Osteoporosis Screening  Completed    Zoster Vaccine  Completed    Pneumococcal Vaccine: 65+ Years  Completed    Influenza Vaccine  Completed    HIB Vaccine  Aged Out    IPV Vaccine  Aged Out    Hepatitis A Vaccine  Aged Out    Meningococcal ACWY Vaccine  Aged Out    HPV Vaccine  Aged Out     Immunization History   Administered Date(s) Administered    COVID-19 J&J (MATRIXX Software) vaccine 0.5 mL 03/08/2021, 11/24/2021    COVID-19 Pfizer vac (Dylan-sucrose, gray cap) 12 yr+ IM 08/29/2022    INFLUENZA 10/01/2015, 10/01/2018, 10/23/2021, 10/07/2022, 09/15/2023    Influenza LAIV (Nasal) 10/20/2008    Influenza Quadrivalent 3 years and older 10/23/2021    Influenza Split 09/20/2011    Influenza, seasonal, injectable, preservative free 10/17/2016, 09/10/2020    Pneumococcal Conjugate Vaccine 20-valent (Pcv20), Polysace 01/09/2023    Pneumococcal Polysaccharide PPV23 04/03/2000, 01/14/2009    Td (adult), adsorbed 01/01/2002    Tdap 05/10/2012    Zoster Vaccine Recombinant 02/27/2020, 06/16/2020       OCREY Escobedo

## 2024-04-03 NOTE — TELEPHONE ENCOUNTER
Please contact Melita.     Please have Melita check in with her dermatologist regarding if Tremfya needs to be held at all for upcoming surgery.

## 2024-04-03 NOTE — ASSESSMENT & PLAN NOTE
Known history of white coat hypertension. Home blood pressures run 120-130's/90's.   She follows with cardiology, and they plan to change her medications after her surgery is completed.

## 2024-04-08 DIAGNOSIS — R06.83 SNORING: Primary | ICD-10-CM

## 2024-04-08 NOTE — PRE-PROCEDURE INSTRUCTIONS
Pre-Surgery Instructions:   Medication Instructions    Cholecalciferol (Vitamin D3) 50 MCG (2000 UT) capsule Stop taking 7 days prior to surgery.    fluticasone (FLONASE) 50 mcg/act nasal spray Uses PRN- DO NOT take day of surgery    Probiotic Product (PROBIOTIC BLEND PO) Hold day of surgery.    psyllium (METAMUCIL) 58.6 % packet Hold day of surgery.    Tiadylt  MG 24 hr capsule Take night before surgery    traZODone (DESYREL) 50 mg tablet Take night before surgery    Turmeric 400 MG CAPS Stop taking 7 days prior to surgery.   Pt states she has Sling and will bring it day of surgery.   Medication instructions for day surgery reviewed. Please use only a sip of water to take your instructed medications. Avoid all over the counter vitamins, supplements and NSAIDS for one week prior to surgery per anesthesia guidelines. Tylenol is ok to take as needed.     You will receive a call one business day prior to surgery with an arrival time and hospital directions. If your surgery is scheduled on a Monday, the hospital will be calling you on the Friday prior to your surgery. If you have not heard from anyone by 8pm, please call the hospital supervisor through the hospital  at [phone]. ([place] [phone] or [place] [phone]).    Do not eat or drink anything after midnight the night before your surgery, including candy, mints, lifesavers, or chewing gum. Do not drink alcohol 24hrs before your surgery. Try not to smoke at least 24hrs before your surgery.       Follow the pre surgery showering instructions as listed in the “My Surgical Experience Booklet” or otherwise provided by your surgeon's office. Do not use a blade to shave the surgical area 1 week before surgery. It is okay to use a clean electric clippers up to 24 hours before surgery. Do not apply any lotions, creams, including makeup, cologne, deodorant, or perfumes after showering on the day of your surgery. Do not use dry shampoo, hair  spray, hair gel, or any type of hair products.     No contact lenses, eye make-up, or artificial eyelashes. Remove nail polish, including gel polish, and any artificial, gel, or acrylic nails if possible. Remove all jewelry including rings and body piercing jewelry.     Wear causal clothing that is easy to take on and off. Consider your type of surgery.    Keep any valuables, jewelry, piercings at home. Please bring any specially ordered equipment (sling, braces) if indicated.    Arrange for a responsible person to drive you to and from the hospital on the day of your surgery. Please confirm the visitor policy for the day of your procedure when you receive your phone call with an arrival time.     Call the surgeon's office with any new illnesses, exposures, or additional questions prior to surgery.    Please reference your “My Surgical Experience Booklet” for additional information to prepare for your upcoming surgery.

## 2024-04-10 ENCOUNTER — TELEPHONE (OUTPATIENT)
Age: 69
End: 2024-04-10

## 2024-04-10 NOTE — TELEPHONE ENCOUNTER
Caller: Patient    Doctor: Trae    Reason for call:     Patient is having surgery on 4/15/24 for right shoulder.  The patient is questioning is she   can take Flonase before and after surgery??  Or if anything else can recommend with   allergies kicking up?  Please call to advise..    Call back#: [phone]

## 2024-04-10 NOTE — TELEPHONE ENCOUNTER
Caller: Self    Doctor: Trae    Reason for call: Patient wanted to let team know she will be taking Flonase starting today    Call back#: [phone]

## 2024-04-10 NOTE — TELEPHONE ENCOUNTER
Good Morning!  Melita did get a call from nurses, but couldn't remember if she could take that!  I did confirm with the  nurses & returned her call    Thanks, Marie

## 2024-04-15 ENCOUNTER — HOSPITAL ENCOUNTER (OUTPATIENT)
Facility: AMBULARY SURGERY CENTER | Age: 69
Setting detail: OUTPATIENT SURGERY
Discharge: HOME | End: 2024-04-15
Attending: STUDENT IN AN ORGANIZED HEALTH CARE EDUCATION/TRAINING PROGRAM | Admitting: STUDENT IN AN ORGANIZED HEALTH CARE EDUCATION/TRAINING PROGRAM
Payer: MEDICARE

## 2024-04-15 ENCOUNTER — ANESTHESIA (OUTPATIENT)
Dept: PERIOP | Facility: AMBULARY SURGERY CENTER | Age: 69
End: 2024-04-15
Payer: MEDICARE

## 2024-04-15 VITALS
SYSTOLIC BLOOD PRESSURE: 149 MMHG | BODY MASS INDEX: 24.11 KG/M2 | RESPIRATION RATE: 21 BRPM | TEMPERATURE: 97.6 F | WEIGHT: 150 LBS | HEIGHT: 66 IN | HEART RATE: 90 BPM | DIASTOLIC BLOOD PRESSURE: 99 MMHG | OXYGEN SATURATION: 97 %

## 2024-04-15 DIAGNOSIS — S46.011D TRAUMATIC TEAR OF RIGHT ROTATOR CUFF, UNSPECIFIED TEAR EXTENT, SUBSEQUENT ENCOUNTER: Primary | ICD-10-CM

## 2024-04-15 PROCEDURE — C1713 ANCHOR/SCREW BN/BN,TIS/BN: HCPCS | Performed by: STUDENT IN AN ORGANIZED HEALTH CARE EDUCATION/TRAINING PROGRAM

## 2024-04-15 PROCEDURE — 29826 SHO ARTHRS SRG DECOMPRESSION: CPT

## 2024-04-15 PROCEDURE — C1781 MESH (IMPLANTABLE): HCPCS | Performed by: STUDENT IN AN ORGANIZED HEALTH CARE EDUCATION/TRAINING PROGRAM

## 2024-04-15 PROCEDURE — 29827 SHO ARTHRS SRG RT8TR CUF RPR: CPT

## 2024-04-15 PROCEDURE — C9290 INJ, BUPIVACAINE LIPOSOME: HCPCS | Performed by: ANESTHESIOLOGY

## 2024-04-15 PROCEDURE — 29826 SHO ARTHRS SRG DECOMPRESSION: CPT | Performed by: STUDENT IN AN ORGANIZED HEALTH CARE EDUCATION/TRAINING PROGRAM

## 2024-04-15 PROCEDURE — 29827 SHO ARTHRS SRG RT8TR CUF RPR: CPT | Performed by: STUDENT IN AN ORGANIZED HEALTH CARE EDUCATION/TRAINING PROGRAM

## 2024-04-15 DEVICE — SELF-PUNCHING TRIPLE LOADED FIBERTAK
Type: IMPLANTABLE DEVICE | Site: SHOULDER | Status: FUNCTIONAL
Brand: ARTHREX®

## 2024-04-15 DEVICE — BIO-COMP SWIVELOCK C, CLD 5.5X19.1MM
Type: IMPLANTABLE DEVICE | Site: SHOULDER | Status: FUNCTIONAL
Brand: ARTHREX®

## 2024-04-15 DEVICE — 3.5MM PUSHLOCK, BIOCOMP, SELF-PUNCHING
Type: IMPLANTABLE DEVICE | Site: SHOULDER | Status: FUNCTIONAL
Brand: ARTHREX®

## 2024-04-15 DEVICE — IMPLANTABLE DEVICE: Type: IMPLANTABLE DEVICE | Site: SHOULDER | Status: FUNCTIONAL

## 2024-04-15 RX ORDER — MIDAZOLAM HYDROCHLORIDE 2 MG/2ML
INJECTION, SOLUTION INTRAMUSCULAR; INTRAVENOUS
Status: COMPLETED | OUTPATIENT
Start: 2024-04-15 | End: 2024-04-15

## 2024-04-15 RX ORDER — SODIUM CHLORIDE, SODIUM LACTATE, POTASSIUM CHLORIDE, AND CALCIUM CHLORIDE .6; .31; .03; .02 G/100ML; G/100ML; G/100ML; G/100ML
IRRIGANT IRRIGATION AS NEEDED
Status: DISCONTINUED | OUTPATIENT
Start: 2024-04-15 | End: 2024-04-15 | Stop reason: HOSPADM

## 2024-04-15 RX ORDER — OXYCODONE HYDROCHLORIDE 5 MG/1
5 TABLET ORAL EVERY 4 HOURS PRN
Qty: 25 TABLET | Refills: 0 | Status: SHIPPED | OUTPATIENT
Start: 2024-04-15

## 2024-04-15 RX ORDER — CEFAZOLIN SODIUM 2 G/50ML
2000 SOLUTION INTRAVENOUS ONCE
Status: COMPLETED | OUTPATIENT
Start: 2024-04-15 | End: 2024-04-15

## 2024-04-15 RX ORDER — NEOSTIGMINE METHYLSULFATE 1 MG/ML
INJECTION INTRAVENOUS AS NEEDED
Status: DISCONTINUED | OUTPATIENT
Start: 2024-04-15 | End: 2024-04-15

## 2024-04-15 RX ORDER — CHLORHEXIDINE GLUCONATE ORAL RINSE 1.2 MG/ML
15 SOLUTION DENTAL ONCE
Status: DISCONTINUED | OUTPATIENT
Start: 2024-04-15 | End: 2024-04-15 | Stop reason: HOSPADM

## 2024-04-15 RX ORDER — ALBUTEROL SULFATE 2.5 MG/3ML
2.5 SOLUTION RESPIRATORY (INHALATION) ONCE AS NEEDED
Status: DISCONTINUED | OUTPATIENT
Start: 2024-04-15 | End: 2024-04-15 | Stop reason: HOSPADM

## 2024-04-15 RX ORDER — DEXAMETHASONE SODIUM PHOSPHATE 10 MG/ML
INJECTION, SOLUTION INTRAMUSCULAR; INTRAVENOUS AS NEEDED
Status: DISCONTINUED | OUTPATIENT
Start: 2024-04-15 | End: 2024-04-15

## 2024-04-15 RX ORDER — ACETAMINOPHEN 325 MG/1
975 TABLET ORAL EVERY 8 HOURS
Status: DISCONTINUED | OUTPATIENT
Start: 2024-04-15 | End: 2024-04-15 | Stop reason: HOSPADM

## 2024-04-15 RX ORDER — FENTANYL CITRATE 50 UG/ML
INJECTION, SOLUTION INTRAMUSCULAR; INTRAVENOUS AS NEEDED
Status: DISCONTINUED | OUTPATIENT
Start: 2024-04-15 | End: 2024-04-15

## 2024-04-15 RX ORDER — ONDANSETRON 2 MG/ML
INJECTION INTRAMUSCULAR; INTRAVENOUS AS NEEDED
Status: DISCONTINUED | OUTPATIENT
Start: 2024-04-15 | End: 2024-04-15

## 2024-04-15 RX ORDER — PHENYLEPHRINE HCL IN 0.9% NACL 1 MG/10 ML
SYRINGE (ML) INTRAVENOUS AS NEEDED
Status: DISCONTINUED | OUTPATIENT
Start: 2024-04-15 | End: 2024-04-15

## 2024-04-15 RX ORDER — FENTANYL CITRATE/PF 50 MCG/ML
25 SYRINGE (ML) INJECTION
Status: DISCONTINUED | OUTPATIENT
Start: 2024-04-15 | End: 2024-04-15 | Stop reason: HOSPADM

## 2024-04-15 RX ORDER — ACETAMINOPHEN 325 MG/1
650 TABLET ORAL EVERY 6 HOURS PRN
Status: DISCONTINUED | OUTPATIENT
Start: 2024-04-15 | End: 2024-04-15 | Stop reason: HOSPADM

## 2024-04-15 RX ORDER — HYDROMORPHONE HCL/PF 1 MG/ML
0.5 SYRINGE (ML) INJECTION
Status: DISCONTINUED | OUTPATIENT
Start: 2024-04-15 | End: 2024-04-15 | Stop reason: HOSPADM

## 2024-04-15 RX ORDER — MEPERIDINE HYDROCHLORIDE 25 MG/ML
12.5 INJECTION INTRAMUSCULAR; INTRAVENOUS; SUBCUTANEOUS
Status: DISCONTINUED | OUTPATIENT
Start: 2024-04-15 | End: 2024-04-15 | Stop reason: HOSPADM

## 2024-04-15 RX ORDER — ONDANSETRON 2 MG/ML
4 INJECTION INTRAMUSCULAR; INTRAVENOUS EVERY 6 HOURS PRN
Status: DISCONTINUED | OUTPATIENT
Start: 2024-04-15 | End: 2024-04-15 | Stop reason: HOSPADM

## 2024-04-15 RX ORDER — PROPOFOL 10 MG/ML
INJECTION, EMULSION INTRAVENOUS AS NEEDED
Status: DISCONTINUED | OUTPATIENT
Start: 2024-04-15 | End: 2024-04-15

## 2024-04-15 RX ORDER — ASPIRIN 81 MG/1
81 TABLET, CHEWABLE ORAL 2 TIMES DAILY
Qty: 60 TABLET | Refills: 0 | Status: SHIPPED | OUTPATIENT
Start: 2024-04-15 | End: 2024-05-15

## 2024-04-15 RX ORDER — PROMETHAZINE HYDROCHLORIDE 25 MG/ML
12.5 INJECTION, SOLUTION INTRAMUSCULAR; INTRAVENOUS ONCE AS NEEDED
Status: DISCONTINUED | OUTPATIENT
Start: 2024-04-15 | End: 2024-04-15 | Stop reason: HOSPADM

## 2024-04-15 RX ORDER — SODIUM CHLORIDE, SODIUM LACTATE, POTASSIUM CHLORIDE, CALCIUM CHLORIDE 600; 310; 30; 20 MG/100ML; MG/100ML; MG/100ML; MG/100ML
INJECTION, SOLUTION INTRAVENOUS CONTINUOUS PRN
Status: DISCONTINUED | OUTPATIENT
Start: 2024-04-15 | End: 2024-04-15

## 2024-04-15 RX ORDER — ONDANSETRON 2 MG/ML
4 INJECTION INTRAMUSCULAR; INTRAVENOUS ONCE AS NEEDED
Status: DISCONTINUED | OUTPATIENT
Start: 2024-04-15 | End: 2024-04-15 | Stop reason: HOSPADM

## 2024-04-15 RX ORDER — EPHEDRINE SULFATE 50 MG/ML
INJECTION INTRAVENOUS AS NEEDED
Status: DISCONTINUED | OUTPATIENT
Start: 2024-04-15 | End: 2024-04-15

## 2024-04-15 RX ORDER — LIDOCAINE HYDROCHLORIDE 10 MG/ML
INJECTION, SOLUTION EPIDURAL; INFILTRATION; INTRACAUDAL; PERINEURAL AS NEEDED
Status: DISCONTINUED | OUTPATIENT
Start: 2024-04-15 | End: 2024-04-15

## 2024-04-15 RX ORDER — OXYCODONE HYDROCHLORIDE 5 MG/1
5 TABLET ORAL EVERY 4 HOURS PRN
Status: DISCONTINUED | OUTPATIENT
Start: 2024-04-15 | End: 2024-04-15 | Stop reason: HOSPADM

## 2024-04-15 RX ORDER — ROCURONIUM BROMIDE 10 MG/ML
INJECTION, SOLUTION INTRAVENOUS AS NEEDED
Status: DISCONTINUED | OUTPATIENT
Start: 2024-04-15 | End: 2024-04-15

## 2024-04-15 RX ORDER — SUCCINYLCHOLINE/SOD CL,ISO/PF 100 MG/5ML
SYRINGE (ML) INTRAVENOUS AS NEEDED
Status: DISCONTINUED | OUTPATIENT
Start: 2024-04-15 | End: 2024-04-15

## 2024-04-15 RX ORDER — TRANEXAMIC ACID 10 MG/ML
1000 INJECTION, SOLUTION INTRAVENOUS ONCE
Status: COMPLETED | OUTPATIENT
Start: 2024-04-15 | End: 2024-04-15

## 2024-04-15 RX ORDER — BUPIVACAINE HYDROCHLORIDE 5 MG/ML
INJECTION, SOLUTION EPIDURAL; INTRACAUDAL
Status: COMPLETED | OUTPATIENT
Start: 2024-04-15 | End: 2024-04-15

## 2024-04-15 RX ORDER — GLYCOPYRROLATE 0.2 MG/ML
INJECTION INTRAMUSCULAR; INTRAVENOUS AS NEEDED
Status: DISCONTINUED | OUTPATIENT
Start: 2024-04-15 | End: 2024-04-15

## 2024-04-15 RX ORDER — OXYCODONE HYDROCHLORIDE 5 MG/1
10 TABLET ORAL EVERY 4 HOURS PRN
Status: DISCONTINUED | OUTPATIENT
Start: 2024-04-15 | End: 2024-04-15 | Stop reason: HOSPADM

## 2024-04-15 RX ADMIN — LIDOCAINE HYDROCHLORIDE 50 MG: 10 INJECTION, SOLUTION EPIDURAL; INFILTRATION; INTRACAUDAL; PERINEURAL at 12:54

## 2024-04-15 RX ADMIN — SODIUM CHLORIDE, SODIUM LACTATE, POTASSIUM CHLORIDE, AND CALCIUM CHLORIDE: .6; .31; .03; .02 INJECTION, SOLUTION INTRAVENOUS at 14:31

## 2024-04-15 RX ADMIN — ACETAMINOPHEN 975 MG: 325 TABLET, FILM COATED ORAL at 17:50

## 2024-04-15 RX ADMIN — ONDANSETRON 4 MG: 2 INJECTION INTRAMUSCULAR; INTRAVENOUS at 13:15

## 2024-04-15 RX ADMIN — BUPIVACAINE HYDROCHLORIDE 15 ML: 5 INJECTION, SOLUTION EPIDURAL; INTRACAUDAL at 12:26

## 2024-04-15 RX ADMIN — MIDAZOLAM 2 MG: 1 INJECTION INTRAMUSCULAR; INTRAVENOUS at 12:26

## 2024-04-15 RX ADMIN — TRANEXAMIC ACID 1000 MG: 10 INJECTION, SOLUTION INTRAVENOUS at 13:03

## 2024-04-15 RX ADMIN — PHENYLEPHRINE HYDROCHLORIDE 50 MCG/MIN: 50 INJECTION INTRAVENOUS at 14:05

## 2024-04-15 RX ADMIN — CEFAZOLIN SODIUM 2000 MG: 2 SOLUTION INTRAVENOUS at 12:33

## 2024-04-15 RX ADMIN — Medication 200 MCG: at 14:03

## 2024-04-15 RX ADMIN — SODIUM CHLORIDE, SODIUM LACTATE, POTASSIUM CHLORIDE, AND CALCIUM CHLORIDE: .6; .31; .03; .02 INJECTION, SOLUTION INTRAVENOUS at 12:15

## 2024-04-15 RX ADMIN — Medication 100 MCG: at 13:06

## 2024-04-15 RX ADMIN — EPHEDRINE SULFATE 5 MG: 50 INJECTION INTRAVENOUS at 13:09

## 2024-04-15 RX ADMIN — Medication 100 MCG: at 12:45

## 2024-04-15 RX ADMIN — ROCURONIUM 30 MG: 50 INJECTION, SOLUTION INTRAVENOUS at 13:01

## 2024-04-15 RX ADMIN — PROPOFOL 200 MG: 10 INJECTION, EMULSION INTRAVENOUS at 12:54

## 2024-04-15 RX ADMIN — ONDANSETRON 4 MG: 2 INJECTION INTRAMUSCULAR; INTRAVENOUS at 16:16

## 2024-04-15 RX ADMIN — Medication 100 MCG: at 12:52

## 2024-04-15 RX ADMIN — FENTANYL CITRATE 100 MCG: 50 INJECTION INTRAMUSCULAR; INTRAVENOUS at 12:54

## 2024-04-15 RX ADMIN — BUPIVACAINE 20 ML: 13.3 INJECTION, SUSPENSION, LIPOSOMAL INFILTRATION at 12:26

## 2024-04-15 RX ADMIN — Medication 100 MCG: at 12:56

## 2024-04-15 RX ADMIN — NEOSTIGMINE METHYLSULFATE 3 MG: 1 INJECTION INTRAVENOUS at 15:30

## 2024-04-15 RX ADMIN — GLYCOPYRROLATE 0.6 MCG: 0.2 INJECTION INTRAMUSCULAR; INTRAVENOUS at 15:30

## 2024-04-15 RX ADMIN — Medication 100 MCG: at 13:00

## 2024-04-15 RX ADMIN — PROPOFOL 50 MG: 10 INJECTION, EMULSION INTRAVENOUS at 14:31

## 2024-04-15 RX ADMIN — Medication 100 MCG: at 13:10

## 2024-04-15 RX ADMIN — Medication 100 MG: at 12:37

## 2024-04-15 RX ADMIN — DEXAMETHASONE SODIUM PHOSPHATE 10 MG: 10 INJECTION, SOLUTION INTRAMUSCULAR; INTRAVENOUS at 13:15

## 2024-04-15 RX ADMIN — EPHEDRINE SULFATE 5 MG: 50 INJECTION INTRAVENOUS at 15:19

## 2024-04-15 NOTE — ANESTHESIA PREPROCEDURE EVALUATION
Procedure:  SHOULDER ARTHROSCOPIC ROTATOR CUFF REPAIR (Right: Shoulder)  ARTHROSCOPIC SUBACROMIAL DECOMPRESSION (Right: Shoulder)    Relevant Problems   CARDIO   (+) Hypertension, essential, benign   (+) Pure hypercholesterolemia      MUSCULOSKELETAL   (+) Psoriatic arthritis (HCC)        Physical Exam    Airway    Mallampati score: I  TM Distance: >3 FB  Neck ROM: full     Dental       Cardiovascular  Cardiovascular exam normal    Pulmonary  Pulmonary exam normal     Other Findings  post-pubertal.      Anesthesia Plan  ASA Score- 2     Anesthesia Type- general with ASA Monitors.         Additional Monitors:     Airway Plan: ETT.           Plan Factors-Exercise tolerance (METS): >4 METS.    Chart reviewed. EKG reviewed. Imaging results reviewed. Existing labs reviewed. Patient summary reviewed.    Patient is not a current smoker.  Patient did not smoke on day of surgery.    Obstructive sleep apnea risk education given perioperatively.        Induction- intravenous.    Postoperative Plan- Plan for postoperative opioid use. Planned trial extubation    Informed Consent- Anesthetic plan and risks discussed with patient.  I personally reviewed this patient with the CRNA. Discussed and agreed on the Anesthesia Plan with the CRNA..

## 2024-04-15 NOTE — OP NOTE
OPERATIVE REPORT  PATIENT NAME: Rosalind Guerrero    :  1955  MRN: [id number]  Pt Location: [facility] OR [facility]OM 06    SURGERY DATE: 4/15/2024    Surgeons and Role:     * Joseph Larkin, DO - Primary     *Veronica Flowers - PAC     * Rigo Olivia Jr. MS, ATC - Assisting      Preop Diagnosis:  Traumatic tear of right rotator cuff, unspecified tear extent, subsequent encounter [S46.011D]    Post-Op Diagnosis Codes:     * Traumatic tear of right rotator cuff, unspecified tear extent, subsequent encounter [S46.011D]    Procedure(s):  Right - SHOULDER ARTHROSCOPIC ROTATOR CUFF REPAIR  Right - SHOULDER DERMAL ALLOGRAFT AUGMENTATION  Right - ARTHROSCOPIC SUBACROMIAL DECOMPRESSION    Specimen(s):  * No specimens in log *    Estimated Blood Loss:   Minimal    Drains:  * No LDAs found *    Anesthesia Type:   General w/ Regional    Operative Indications:  Traumatic tear of right rotator cuff, unspecified tear extent, subsequent encounter [S46.011D]  The patient is a very active 68 year-old  who comes in to me with significant problems associated with right shoulder. After failure of conservative nonoperative care, eventually came and saw me, on physical examination with x-ray and radiographic findings, found to have a supraspinatus tear, type 3 acromion, slap tear, subacromial bursitis.  The risks and benefits of surgical intervention were explained including, but not exclusive to, infection, DVT, loss of range of motion, stiffness, continuance of pain, failure, fracture, dislocation, delayed union, malunion, nonunion, wound complications, and neurovascular compromise.      Operative Findings:  Full thickness, near full width supraspinatus tear  Thin, atrophic supraspinatus tendon tissue  Type 3 acromion, subacromial bursitis  Degenerative labral tearing    Complications:   None    Procedure and Technique:  Patient was seen examined in the preoperative holding area.  Patient's identity was confirmed with the wrist band and  hospital chart. The operative extremity was marked in the laterality was confirmed and signed by both the patient and myself matching the consent.    Patient was brought to the operating room where there placed supine in a beach chair position. Anesthesia was induced.  All bony prominences were padded.  A wedge was placed below the legs.  Patient was positioned upright in a beach chair position using the foam head positioner in a neutral cervical alignment.  The well arm was placed on a padded arm ramirez.     The operative arm was evaluated under anesthesia and demonstrated  full passive range of motion    Shoulder was sterilely prepped and draped.  A formal time-out was performed.  Standard posterior portal was used and examination of the intraarticular portion of joint revealed patient had  grade 2 changes to the humeral head and grade 2 changes to the glenoid.  Anterior incision made using a spinal needle via outside in technique. We debrided the superior labral anterior, posterior back to a stable rim. We did a synovectomy within the joint. The biceps tendon was examined and found to be intact with minimal tenosynovitis. Degenerative tearing of the anterior, posterior, superior labrum was debrided.  Evaluation of the undersurface of the rotator cuff demonstrated intact subscapularis, full thickness supraspinatus tear. Tissue quality from glenohumeral view was thin. Shoulder was copiously irrigated and drained.     We went to the subacromial space, made a standard lateral incision. We did an extensive bursectomy , removed the soft tissue from the undersurface of the acromion.   There was a type 3 acromion. Then viewing laterally, working posteriorly, using the posterior aspect of the acromion as a cutting block, we turned the type 3 acromion into a type 1 acromion using a jose rafael.     There was minimal erythema in the AC joint area.     Evaluation of the rotator cuff showed a  full-thickness defect on the  tuberosity. We got a good bleeding base on that. We then removed the soft tissue in that area. This tear was larger than MRI, approximately 1.5cm in diameter. Tissue quality was very thin and atrophic appearing tendon. Mobilization was good without tension.     We placed an Arthrex 2.6 mm self punching anchor along the anterior aspect of the rotator cuff footprint.  This was triple loaded with suture tape sutures were passed in horizontal mattress fashion using a scorpion device.  We then placed a second 2.6 mm fiber tack anchor along the posterior aspect of the medial row.  These were again passed with the scorpion in horizontal mattress fashion.  We then proceeded with a double row repair utilizing 1 suture from each anchor.  We began with a spot on the anterior tuberosity with the arm in slight external rotation.  1 suture from each anchor was retrieved and placed through a 5.5 mm Arthrex swivel lock anchor.  5.5 anchor was used due to below average bone quality laterally.  We utilized a 4.5 tap and tension the sutures under direct visualization until the rotator cuff covered its footprint.  We then twisted the anchor into place which had reasonable fixation.  Suture tails were then cut flush of there is no anterior dogear the core suture was removed.    We then repeated this process more posteriorly for posterolateral lateral row anchor.  The remaining suture from each anchor was retrieved and placed through another Arthrex 5.5 mm swivel lock anchor.  The punch was used and the eyelet was placed through the previously tapped hole while the sutures were tensioned under direct visualization.  This demonstrated complete restoration of the rotator cuff upon its footprint.  The swivel lock was then twisted into place with reasonable fixation.  Suture tails were cut flush and the dogear stitch was removed.    Due to patient tissue quality, bone quality, thin supraspinatus with atrophy ,and high risk of Retear, the  decision was made to augment the repair with a dermal allograft.  An Arthrex 3 mm dermal allograft was opened in fashion to approximately 20 x 25 mm on the back table.  This was prepared according to the cuff mend technique guide.    Utilizing a low direct lateral portal, a passport cannula was placed and the rolled dermal allograft was inserted upon its .  We then unrolled the graft into place and fixed it medially with Arthrex fiber stitch meniscal repair anchors.  This was done to take care of voiding penetrating the deep articular structures by marking our joint line with a spinal needle previously in the case.  Sutures were then tightened which demonstrated medial fixation of our dermal allograft with approximately 3 meniscal repair anchors.    We then proceeded laterally and utilized Arthrex self punching push lock anchors just distal and anterior to the previously used lateral row anchor, I just distal and posterior to the previously used posterolateral lateral row anchor.  These were placed under direct visualization while self tensioning.  Demonstrated reasonable fixation.  Suture tails were cut flush    We then probed the dermal allograft which demonstrated good fixation and stable throughout the range of motion.    The patient tolerated this very well. The shoulder was copiously irrigated and drained. The wounds were closed with 3-0 Nylon interrupted sutures. A sterile dressing was placed on the shoulder. The patient had good capillary refill. The patient was awoken in the operating room and brought to recovery room in stable condition in an abduction splint.  There transported to the PACU in stable condition.     I was present for the entire procedure., A qualified resident physician was not available., and A physician assistant was required during the procedure for retraction, tissue handling, dissection and suturing.    Patient Disposition:  PACU         SIGNATURE: Joseph Larkin,   DATE:  April 15, 2024  TIME: 3:44 PM

## 2024-04-15 NOTE — INTERVAL H&P NOTE
H&P reviewed. After examining the patient I find no changes in the patients condition since the H&P had been written.    Vitals:    04/15/24 1207   BP: (!) 171/96   Pulse: 75   Resp: 18   Temp: 98.8 °F (37.1 °C)   SpO2: 96%

## 2024-04-15 NOTE — ANESTHESIA PROCEDURE NOTES
Peripheral Block    Patient location during procedure: holding area  Reason for block: at surgeon's request and post-op pain management  Staffing  Performed by: Eligio Chambers MD  Authorized by: Eligio Chambers MD    Preanesthetic Checklist  Completed: patient identified, IV checked, site marked, risks and benefits discussed, surgical consent, monitors and equipment checked, pre-op evaluation and timeout performed  Peripheral Block  Prep: ChloraPrep  Patient monitoring: continuous pulse ox and frequent blood pressure checks  Block type: Interscalene  Laterality: right  Injection technique: single-shot  Procedures: ultrasound guided, Ultrasound guidance required for the procedure to increase accuracy and safety of medication placement and decrease risk of complications.  Ultrasound permanent image savedbupivacaine (PF) (MARCAINE) 0.5 % injection 20 mL - Perineural   15 mL - 4/15/2024 12:26:00 PM  midazolam (VERSED) injection 0.5 mg - Intravenous   2 mg - 4/15/2024 12:26:00 PM  Assessment  Injection assessment: incremental injection and local visualized surrounding nerve on ultrasound  Paresthesia pain: none  patient tolerated the procedure well with no immediate complications

## 2024-04-15 NOTE — DISCHARGE INSTR - AVS FIRST PAGE
Dr. Larkin Rotator Cuff Repair     What to Expect/Activity     It is normal to have some discomfort in your shoulder for several days.     For the next 48 to 72 hours use?ice around the shoulder to help reduce the pain and swelling for 20-30 minutes every 1-2 hours while you are awake.     Place a pillow underneath your elbow when sitting to help reduce pressure on the shoulder.     Sleeping in an upright position for the first two days will help reduce swelling,?as well.?     You are non-weight bearing to your operative arm. Wear your sling at all times.     No pendulum exercises or active movement of shoulder joint.     We encourage finger, wrist, and elbow range of motion.?     Do not drive until instructed by Dr. Larkin.     Dressing/Wound Care/Bathing     You may remove your dressing 3 days after surgery. Leave any steri-strips in place.     You may shower 3 days after surgery.?After showers, pat incisions and cover with band-aids. Change band-aids after showering.     No baths, swimming or submerging until discussed at your follow-up appointment.?     ?Pain Management/Medications     You may resume your usual medications.     You have been provided a prescription for narcotic pain medication. This is to be used only as needed for breakthrough pain that is not controlled with over-the-counter pain medication.     Please take the following medications:     Take Aspirin 81 mg twice daily for 30 days (blood clot prevention)     Tylenol/acetaminophen - according to bottle instructions; alternate with Ibuprofen (Advil, Motrin) or?other NSAID- according to bottle instructions.     While taking your narcotic medication, an over-the-counter stool softener may be helpful to prevent constipation. Please consider taking Chelo-Colace twice daily while taking this medication.     Nerve Block: If you received a nerve block today your surgical limb may feel numb with loss of muscle control for up to 18-24 hours after surgery.  We recommend taking a dose of pain medication prior to bed tonight, to cover any pain that may occur if your nerve block begins to wear off while you are sleeping.     ?Follow up/Call if:     The findings of your surgery will be explained to you and your family immediately after surgery. However, in the post-operative period, during recovery from anesthesia you may not fully remember or fully understand what was said. This will be explained once again when you return for your post-op appointment and suture removal.     If you were provided with photos of your procedure, please bring them to your follow up appointment for explanation.     Please contact Dr. Larkin’s office if you experience the following:     Excessive bleeding (bleeding through your dressing)     Fever greater than 101 degrees F     Persistent nausea or vomiting     Decreased sensation or discoloration of the operative limb     Pain or swelling that is getting worse and not better with medication

## 2024-04-15 NOTE — ANESTHESIA POSTPROCEDURE EVALUATION
Post-Op Assessment Note    CV Status:  Stable  Pain Score: 0    Pain management: adequate       Mental Status:  Arousable and sleepy   Hydration Status:  Stable and euvolemic   PONV Controlled:  Controlled   Airway Patency:  Patent and adequate     Post Op Vitals Reviewed: Yes    No anethesia notable event occurred.    Staff: CRNA               BP   120/66   Temp      Pulse  87   Resp   16   SpO2   98

## 2024-04-17 ENCOUNTER — TELEPHONE (OUTPATIENT)
Dept: OBGYN CLINIC | Facility: CLINIC | Age: 69
End: 2024-04-17

## 2024-04-17 ENCOUNTER — TELEPHONE (OUTPATIENT)
Age: 69
End: 2024-04-17

## 2024-04-17 NOTE — TELEPHONE ENCOUNTER
Caller: patient    Doctor: yumiko      Reason for call: has some concerns about her surgery  1.Needs clarification on medications for inflammation  2. When is patient is patient allowed to shower?  3.Feet are a little swollen, is that normal?  4.Experiencing a lot of burping, is that normal?  5.How does the patient know if the positioning of the sling is correct, feels like hand is hanging down a little bit       Call back#: [phone]

## 2024-04-17 NOTE — TELEPHONE ENCOUNTER
Spoke with patient on the phone and answered all her questions. Discussed that she is able and encouraged to alternate and stagger ibuprofen and tylenol. Discussed that she may remove her dressings and take a shower on post-op day 3. Discussed and encouraged her that passing gas is a good thing post-op, also that post-operative swelling is not abnormal. She mentions that she went to PT center near her house and made sure her sling was properly placed. Mentioned that if she thinks of more questions she should call and will do best to respond in timely fashion.

## 2024-04-19 ENCOUNTER — OFFICE VISIT (OUTPATIENT)
Dept: OBGYN CLINIC | Facility: CLINIC | Age: 69
End: 2024-04-19

## 2024-04-19 VITALS
HEIGHT: 66 IN | SYSTOLIC BLOOD PRESSURE: 167 MMHG | RESPIRATION RATE: 17 BRPM | WEIGHT: 150 LBS | BODY MASS INDEX: 24.11 KG/M2 | HEART RATE: 80 BPM | DIASTOLIC BLOOD PRESSURE: 91 MMHG

## 2024-04-19 DIAGNOSIS — S46.011D TRAUMATIC TEAR OF RIGHT ROTATOR CUFF, UNSPECIFIED TEAR EXTENT, SUBSEQUENT ENCOUNTER: Primary | ICD-10-CM

## 2024-04-19 PROCEDURE — 99024 POSTOP FOLLOW-UP VISIT: CPT | Performed by: STUDENT IN AN ORGANIZED HEALTH CARE EDUCATION/TRAINING PROGRAM

## 2024-04-19 NOTE — PROGRESS NOTES
Shoulder Post Operative Visit     Assesment:     68 y.o. female 4 days s/p Surgical Arthroscopy of the right shoulder with rotator cuff repair and subacromial decompression, DOS: 4/15/24    Plan:  Melita is present for her first post-op visit. At this time patient is 4 days s/p Surgical Arthroscopy of the right shoulder with rotator cuff repair and subacromial decompression, DOS: 4/15/24. At this time reviewed pictures from surgery with patient and her daughter. At this time her incisions are closed, new steri's were applied, discussed that she can allow steri's to fall off on own and to keep dry. Discussed that she is to start physical therapy, to continue DVT prophylaxis for another 3.5 weeks and continue to wear her sling at all times other than showering and during physical therapy. At this time discussed that she should return in 4 weeks time.     Post-Operative treatment:    Ice to shoulder 1-2 times daily, for 20 minutes at a time.  PT for ROM and strengthening to shoulder, rotator cuff, scapular stabilizers.  Home exercise program for shoulder, including ROM and strenthening.  Instructions given to patient of what exercises to perform.    Imaging:    All imaging from day of surgery was reviewed by myself and explained to the patient.     Sling:  at all times other than showering    DVT Prophylaxis:  Aspirin 81 mg oral twice daily x 3.5 weeks and Ambulation    Follow up:   4 weeks    Patient was advised that if they have any fevers, chills, chest pain, shortness of breath, redness or drainage from the incision, please let our office know immediately.        Chief Complaint   Patient presents with    Right Shoulder - Post-op     Sx: 04/15/24       History of Present Illness:    The patient is a 68 y.o. female who is being evaluated post operatively 4 days s/p Surgical Arthroscopy of the right shoulder with rotator cuff repair and subacromial decompression, DOS: 4/15/24. At this time patient is doing well, her  "pain is well controlled and has not needed to take oxycodone, is taking ibuprofen and tylenol as needed. She states that she has her first physical therapy session this upcoming Wednesday. She has been compliant with wearing her sling and with her DVT prophylaxis. She states that she does not have any numbness but does note that she has some tingling in her fingertips and believes its from sleeping with her hand in thumb  of sling. Patient has no other complaints at this time.     The patient denies any fevers, chills, calf pain, chest pain/shortness of breath, redness or drainage from the incision.         I have reviewed the past medical, surgical, social and family history, medications and allergies as documented in the EMR.    Review of systems: ROS is negative other than that noted in the HPI.  Constitutional: Negative for fatigue and fever.       Physical Exam:    Blood pressure 167/91, pulse 80, resp. rate 17, height 5' 6\" (1.676 m), weight 68 kg (150 lb), not currently breastfeeding.    General/Constitutional: NAD, well developed, well nourished  HENT: Normocephalic, atraumatic  CV: Intact distal pulses, regular rate  Resp: No respiratory distress or labored breathing  Lymphatic: No lymphadenopathy palpated  Neuro: Alert and Oriented x 3, no focal deficits  Psych: Normal mood, normal affect, normal judgement, normal behavior  Skin: Warm, dry, no rashes, no erythema    Shoulder focused exam:        Visual inspection of the shoulder demonstrates normal contour without atrophy  Incisions are intact without discharge   Some anterior overly shoulder echymosis  Passive and Active ROM not tested at this time  Special tests not performed  Capillary refill <2 sec  Musculature soft and compressible  Neurovascularly intact  Sensation and motor function intact    Incisions show no erythema, no drainage      UE NV Exam: +2 Radial pulses bilaterally  Sensation intact to light touch C5-T1 bilaterally, " Radial/median/ulnar nerve motor intact      Scribe Attestation      I,:  Veronica Flowers PA-C am acting as a scribe while in the presence of the attending physician.:       I,:  Veronica Flowers PA-C personally performed the services described in this documentation    as scribed in my presence.:

## 2024-04-22 ENCOUNTER — TELEPHONE (OUTPATIENT)
Age: 69
End: 2024-04-22

## 2024-04-22 NOTE — TELEPHONE ENCOUNTER
Caller: Melita    Doctor: yumiko    Reason for call: Patient noticed a large bruise  on inside the arm near the elbow where the dressing pad is .  Patient wanted to know if this is something to be concerned be about    Call back#: [phone]

## 2024-04-24 ENCOUNTER — EVALUATION (OUTPATIENT)
Dept: PHYSICAL THERAPY | Facility: REHABILITATION | Age: 69
End: 2024-04-24
Payer: MEDICARE

## 2024-04-24 DIAGNOSIS — S46.011D TRAUMATIC TEAR OF RIGHT ROTATOR CUFF, UNSPECIFIED TEAR EXTENT, SUBSEQUENT ENCOUNTER: ICD-10-CM

## 2024-04-24 PROCEDURE — 97161 PT EVAL LOW COMPLEX 20 MIN: CPT | Performed by: PHYSICAL THERAPIST

## 2024-04-24 PROCEDURE — 97110 THERAPEUTIC EXERCISES: CPT | Performed by: PHYSICAL THERAPIST

## 2024-04-24 NOTE — PROGRESS NOTES
PT Evaluation     Today's date: 2024  Patient name: Rosalind Guerrero  : 1955  MRN: [id number]  Referring provider: Veronica Flowers PA-C  Dx:   Encounter Diagnosis     ICD-10-CM    1. Traumatic tear of right rotator cuff, unspecified tear extent, subsequent encounter  S46.011D Ambulatory Referral to Physical Therapy          Start Time: 1745  Stop Time: 1830  Total time in clinic (min): 45 minutes    Assessment  Assessment details: Problem List:  1) R shoulder hypomobility  2) Decreased R RTC function    Rosalind Guerrero is a pleasant 68 y.o. female who presents following R RTC repair on 04/15/2024. She is overall doing well. She has decreased mobility and strength resulting in the pain she is experiencing and fear of not being able to return to PLOF.  No further referral appears necessary at this time based upon examination results.  I expect she will improve in 16-20 weeks. Melita would benefit from skilled physical therapy to address her limitations and allow her to return to all recreational activities.       Impairments: abnormal or restricted ROM, activity intolerance, impaired physical strength, lacks appropriate home exercise program, pain with function, weight-bearing intolerance and poor posture     Symptom irritability: lowUnderstanding of Dx/Px/POC: good   Prognosis: good    Goals  ST-4 weeks  Patient will be independent with home exercise program.   Patient will be able to manage symptoms independently.  Patient will decrease pain by 25-50%  Patient will D/C sling  Patient will perform AROM without compensations    LTG: by discharge  Patient will improve FOTO to goal  Patient will report minimal (1-2/10) pain with aggravating activities to display improvements in overall functional status  Patient will improve range of motion to WFL in all planes      Plan  Patient would benefit from: skilled physical therapy  Planned modality interventions: cryotherapy, thermotherapy: hydrocollator packs  and unattended electrical stimulation  Planned therapy interventions: IADL retraining, joint mobilization, manual therapy, massage, ADL training, activity modification, abdominal trunk stabilization, ADL retraining, balance, balance/weight bearing training, neuromuscular re-education, body mechanics training, behavior modification, strengthening, stretching, therapeutic activities, therapeutic exercise, therapeutic training, transfer training, graded exercise, graded motor, home exercise program, graded activity, gait training, functional ROM exercises, patient education, postural training, IASTM, kinesiology taping and flexibility  Frequency: 2x week  Duration in visits: 20  Duration in weeks: 10  Treatment plan discussed with: patient      Subjective Evaluation    History of Present Illness  Mechanism of injury: Rosalind is a 68 y.o. female presenting to physical therapy on 24 with referral from MD for post op rehab for her R RTC repair and subacromial decompression.           Quality of life: good    Patient Goals  Patient goals for therapy: increased strength, independence with ADLs/IADLs, return to sport/leisure activities, decreased pain and increased motion    Pain  Current pain ratin  At best pain ratin  At worst pain ratin  Quality: sharp and dull ache  Relieving factors: relaxation, rest, ice and medications  Progression: no change          Objective           MMT         AROM          PROM    Shoulder       L       R        L           R      L     R   Flex.      70   Abd.      60   IR.      15   ER.      10            Rhomboid         Mid Trap         Low Trap         Serratus         Infraspnatus         Teres Major         Sub Scap                Precautions: R RTC with subacromial decompression on 04/15/2024    Manuals                                                                 Neuro Re-Ed                                                                                                         Ther Ex 4/24            PROM 20'            Scap squeeze HEP                                                                             HEP/education 9'             Ther Activity                                       Gait Training                                       Modalities

## 2024-04-29 ENCOUNTER — OFFICE VISIT (OUTPATIENT)
Dept: PHYSICAL THERAPY | Facility: REHABILITATION | Age: 69
End: 2024-04-29
Payer: MEDICARE

## 2024-04-29 DIAGNOSIS — S46.011D TRAUMATIC TEAR OF RIGHT ROTATOR CUFF, UNSPECIFIED TEAR EXTENT, SUBSEQUENT ENCOUNTER: Primary | ICD-10-CM

## 2024-04-29 PROCEDURE — 97110 THERAPEUTIC EXERCISES: CPT | Performed by: PHYSICAL THERAPIST

## 2024-04-29 NOTE — PROGRESS NOTES
Daily Note     Today's date: 2024  Patient name: Rosalind Guerrero  : 1955  MRN: [id number]  Referring provider: Veronica Flowers PA-C  Dx:   Encounter Diagnosis     ICD-10-CM    1. Traumatic tear of right rotator cuff, unspecified tear extent, subsequent encounter  S46.011D           Start Time: 1700  Stop Time: 1730  Total time in clinic (min): 30 minutes    Subjective: Reports she is doing okay. Was able to sleep on her back for first time.       Objective: See treatment diary below      Assessment: Tolerated treatment well. Patient exhibited good technique with therapeutic exercises and would benefit from continued PT      Plan: Continue per plan of care.      Precautions: R RTC with subacromial decompression on 04/15/2024    Manuals            GHJ mobs  QD           SCJ mobs  QD           ACJ mobs  QD                        Neuro Re-Ed                                                                                                       Ther Ex            PROM 20' 25'           Scap squeeze HEP                                                                             HEP/education 9'             Ther Activity                                       Gait Training                                       Modalities

## 2024-05-01 ENCOUNTER — OFFICE VISIT (OUTPATIENT)
Dept: PHYSICAL THERAPY | Facility: REHABILITATION | Age: 69
End: 2024-05-01
Payer: MEDICARE

## 2024-05-01 DIAGNOSIS — S46.011D TRAUMATIC TEAR OF RIGHT ROTATOR CUFF, UNSPECIFIED TEAR EXTENT, SUBSEQUENT ENCOUNTER: Primary | ICD-10-CM

## 2024-05-01 PROCEDURE — 97110 THERAPEUTIC EXERCISES: CPT | Performed by: PHYSICAL THERAPIST

## 2024-05-01 PROCEDURE — 97140 MANUAL THERAPY 1/> REGIONS: CPT | Performed by: PHYSICAL THERAPIST

## 2024-05-01 NOTE — PROGRESS NOTES
Daily Note     Today's date: 2024  Patient name: Rosalind Guerrero  : 1955  MRN: [id number]  Referring provider: Veronica Flowers PA-C  Dx:   Encounter Diagnosis     ICD-10-CM    1. Traumatic tear of right rotator cuff, unspecified tear extent, subsequent encounter  S46.011D           Start Time: 1730  Stop Time: 1810  Total time in clinic (min): 40 minutes    Subjective: Reports she is sleeping better.       Objective: See treatment diary below      Assessment: Tolerated treatment well. Progressing well with range of motion. Patient exhibited good technique with therapeutic exercises and would benefit from continued PT      Plan: Continue per plan of care.  Progress treament per protocol.      Precautions: R RTC with subacromial decompression on 04/15/2024    Manuals           GHJ mobs  QD QD          SCJ mobs  QD QD          ACJ mobs  QD QD                       Neuro Re-Ed                                                                                                      Ther Ex           PROM 20' 25' 30'          Scap squeeze HEP            Suitcase carry   1 laps 3lb KB w/scap squeeze                                                              HEP/education 9'             Ther Activity                                       Gait Training                                       Modalities

## 2024-05-03 DIAGNOSIS — S46.011D TRAUMATIC TEAR OF RIGHT ROTATOR CUFF, UNSPECIFIED TEAR EXTENT, SUBSEQUENT ENCOUNTER: Primary | ICD-10-CM

## 2024-05-03 RX ORDER — TRAMADOL HYDROCHLORIDE 50 MG/1
50 TABLET ORAL EVERY 6 HOURS PRN
Qty: 25 TABLET | Refills: 0 | Status: SHIPPED | OUTPATIENT
Start: 2024-05-03

## 2024-05-03 NOTE — TELEPHONE ENCOUNTER
Caller: Patient     Doctor: Trae    Reason for call: Patient is having a lot of rt shoulder pain. She is asking if this could be from the PT. Stated she has been working very hard in PT and is worried she did damage. Asking if pain after PT is normal. Please advise.    Call back#: [phone]

## 2024-05-03 NOTE — TELEPHONE ENCOUNTER
She should continue as instructed. I called in Tramadol for her to take as needed for severe pain. She should not be using any kettle bells with the operative arm.

## 2024-05-03 NOTE — TELEPHONE ENCOUNTER
Called and spoke with pt PO 4/15/24  SHOULDER ARTHROSCOPIC ROTATOR CUFF REPAIR (Right: Shoulder)   ARTHROSCOPIC SUBACROMIAL DECOMPRESSION (Right: Shoulder)     Pt states she has had 3 sessions so far, Pt states after the first two sessions she had some minor aches. On Wednesday she stated she had a hard physical therapy session. Pt has been doing ROM exercises when her arm is down. Out of the sling before she goes into the shower. When she goes into the shower and her arm is at her side she has increased pain. Once she puts arm back into the sling her arm feels better. Pt is at a 9/10 pain scale once her arm is removed from the sling. Pt states she hasn't used ice but when she is done with the shower she will sometimes use the ice machine, she will take tylenol and ibuprofen as needed or at bed time. Pt denies any increased swelling, bruising or redness. Pt does not use heat to the shoulder any other time during the time. Pt states this has happened only since Wednesday and the passed two days. PT states she was handed a kettle bell to hold and then she was instructed to walk the floor and that caused increased pain. No increased numbness or tingling from the shoulder to the hand.

## 2024-05-06 ENCOUNTER — TELEPHONE (OUTPATIENT)
Age: 69
End: 2024-05-06

## 2024-05-06 ENCOUNTER — TELEPHONE (OUTPATIENT)
Dept: OBGYN CLINIC | Facility: MEDICAL CENTER | Age: 69
End: 2024-05-06

## 2024-05-06 NOTE — TELEPHONE ENCOUNTER
Caller: patient    Doctor: Abhi    Reason for call: patient was instructed to call back post shoulder surgery on 4/15. Patient needs to know if Dr. Moe will change her BP medication, please advice.    Call back#: [phone]

## 2024-05-06 NOTE — TELEPHONE ENCOUNTER
Patient's orthopaedic surgeon is advising her to take calcium, but, she has stopped the calcium as Dr. Moe didn't want her to take the calcium.  She is asking if Dr. Moe could call  her in regards to this matter as she doesn't know if she should take it or not.

## 2024-05-06 NOTE — TELEPHONE ENCOUNTER
Caller: Melita     Doctor: Dr Larkin    Reason for call: The patient would like her PT referral faxed to:     FAX - [phone]    [facility]    Please fax as soon as possible. Thank you.   Pt was not happy with [facility]    Call back#: [phone]

## 2024-05-07 ENCOUNTER — OFFICE VISIT (OUTPATIENT)
Dept: FAMILY MEDICINE CLINIC | Facility: CLINIC | Age: 69
End: 2024-05-07
Payer: MEDICARE

## 2024-05-07 ENCOUNTER — APPOINTMENT (OUTPATIENT)
Dept: PHYSICAL THERAPY | Facility: REHABILITATION | Age: 69
End: 2024-05-07
Payer: MEDICARE

## 2024-05-07 VITALS
SYSTOLIC BLOOD PRESSURE: 142 MMHG | OXYGEN SATURATION: 96 % | RESPIRATION RATE: 16 BRPM | TEMPERATURE: 97.6 F | DIASTOLIC BLOOD PRESSURE: 90 MMHG | BODY MASS INDEX: 24.11 KG/M2 | WEIGHT: 150 LBS | HEART RATE: 86 BPM | HEIGHT: 66 IN

## 2024-05-07 DIAGNOSIS — L30.9 DERMATITIS: Primary | ICD-10-CM

## 2024-05-07 DIAGNOSIS — M85.80 OSTEOPENIA, UNSPECIFIED LOCATION: ICD-10-CM

## 2024-05-07 DIAGNOSIS — I10 HYPERTENSION, ESSENTIAL, BENIGN: Chronic | ICD-10-CM

## 2024-05-07 DIAGNOSIS — L40.50 PSORIATIC ARTHRITIS (HCC): Chronic | ICD-10-CM

## 2024-05-07 PROCEDURE — G2211 COMPLEX E/M VISIT ADD ON: HCPCS | Performed by: FAMILY MEDICINE

## 2024-05-07 PROCEDURE — 99213 OFFICE O/P EST LOW 20 MIN: CPT | Performed by: FAMILY MEDICINE

## 2024-05-07 RX ORDER — CLOTRIMAZOLE AND BETAMETHASONE DIPROPIONATE 10; .64 MG/G; MG/G
CREAM TOPICAL 2 TIMES DAILY
Qty: 45 G | Refills: 1 | Status: SHIPPED | OUTPATIENT
Start: 2024-05-07

## 2024-05-07 NOTE — TELEPHONE ENCOUNTER
No calcium!!  Its not your fault Shira, but my note very clearly states that she needs to monitor her BP after rotator cuff surgery and once she is out of her sling and we would only change BP meds if her BP is actually still high.  It appears whoever took the call failed to read my note.

## 2024-05-07 NOTE — PROGRESS NOTES
Name: Rosalind Guerrero      : 1955      MRN: [id number]  Encounter Provider: Diana Sam MD  Encounter Date: 2024   Encounter department: [facility]    Assessment & Plan     1. Dermatitis  -     clotrimazole-betamethasone (LOTRISONE) 1-0.05 % cream; Apply topically 2 (two) times a day    2. Osteopenia, unspecified location  Assessment & Plan:  Orthopedic surgery is concerned about bone density based on recent surgical intervention.  Bone density scan 2023 reveals osteopenia.    Orders:  -     Ambulatory referral to Rheumatology; Future    3. Psoriatic arthritis (HCC)  Assessment & Plan:  On Tremfya    Orders:  -     Ambulatory referral to Rheumatology; Future    4. Hypertension, essential, benign  Assessment & Plan:  Blood pressure is on the high side of normal.  Patient follows with cardiology regarding further Rx adjustment.  She remains on Tiadylt   mg daily             Subjective     S/p  arthroscopy right shoulder 4/15/24.Recovering well.  Patient reports that orthopedic surgery was concerned about bone density based on recent procedure.  She had bone density scan performed in  consistent with osteopenia.  Patient with history of psoriasis.  On Tremfya.    The patient is concerned about recently noted rash left dorsal foot/ anterior ankle surface,non pruritic, noticed a few weeks ago.  Patient ashley under care of dermatology.              Review of Systems   Constitutional: Negative.    HENT: Negative.     Respiratory: Negative.     Gastrointestinal: Negative.    Musculoskeletal:  Positive for arthralgias.        As per HPI   Skin:  Positive for rash.       Past Medical History:   Diagnosis Date   • Anxiety    • Arthritis    • Heart valve disease     Mitro valve prolapse   • Hypertension    • Irritable bowel disease    • Psoriasis    • Psoriatic arthritis (HCC)    • Varicella      Past Surgical History:   Procedure Laterality Date   • JOINT  REPLACEMENT Left 2016   • LEFT OOPHORECTOMY Left 2007    benign cyst   • OOPHORECTOMY     • NC SURGICAL ARTHROSCOPY SHERRON W/CORACOACRM LIGM RLS Right 4/15/2024    Procedure: ARTHROSCOPIC SUBACROMIAL DECOMPRESSION;  Surgeon: Joseph Larkin DO;  Location: [facility];  Service: Orthopedics   • NC SURGICAL ARTHROSCOPY SHOULDER W/ROTATOR CUFF RPR Right 4/15/2024    Procedure: SHOULDER ARTHROSCOPIC ROTATOR CUFF REPAIR;  Surgeon: Joseph Larkin DO;  Location: [facility];  Service: Orthopedics   • REPLACEMENT TOTAL KNEE Left      Family History   Problem Relation Age of Onset   • Stroke Mother    • Supraventricular tachycardia Mother    • Psoriasis Mother    • Psoriatic Arthritis Mother    • Hypertension Mother         Mother and father   • Dementia Mother         Mother   • Squamous cell carcinoma Mother         skin- unknown age   • Hypertension Father    • Heart disease Father    • Heart attack Father    • Kidney cancer Sister         20's   • No Known Problems Sister    • No Known Problems Daughter    • Colon cancer Maternal Grandmother 71   • Cancer Maternal Grandmother         Colon cancer   • No Known Problems Maternal Grandfather    • No Known Problems Paternal Grandmother    • Prostate cancer Paternal Grandfather         90's   • No Known Problems Maternal Aunt    • No Known Problems Maternal Aunt      Social History     Socioeconomic History   • Marital status:      Spouse name: None   • Number of children: None   • Years of education: None   • Highest education level: None   Occupational History   • Occupation: retired   Tobacco Use   • Smoking status: Never   • Smokeless tobacco: Never   Vaping Use   • Vaping status: Never Used   Substance and Sexual Activity   • Alcohol use: Not Currently     Alcohol/week: 1.0 standard drink of alcohol     Types: 1 Glasses of wine per week     Comment: With dinner   • Drug use: Not Currently     Types: Marijuana     Comment: Many years ago   • Sexual  activity: Not Currently     Partners: Male     Birth control/protection: Post-menopausal     Comment: Not active in a long time.  No partners, no interest   Other Topics Concern   • None   Social History Narrative   • None     Social Determinants of Health     Financial Resource Strain: Low Risk  (1/19/2024)    Overall Financial Resource Strain (CARDIA)    • Difficulty of Paying Living Expenses: Not hard at all   Food Insecurity: Not on file   Transportation Needs: No Transportation Needs (1/19/2024)    PRAPARE - Transportation    • Lack of Transportation (Medical): No    • Lack of Transportation (Non-Medical): No   Physical Activity: Not on file   Stress: Not on file   Social Connections: Not on file   Intimate Partner Violence: Not on file   Housing Stability: Not on file     Current Outpatient Medications on File Prior to Visit   Medication Sig   • aspirin 81 mg chewable tablet Chew 1 tablet (81 mg total) 2 (two) times a day   • Cholecalciferol (Vitamin D3) 50 MCG (2000 UT) capsule    • fluticasone (FLONASE) 50 mcg/act nasal spray SPRAY 2 SPRAYS INTO EACH NOSTRIL EVERY DAY (Patient taking differently: if needed)   • guselkumab (TREMFYA) subcutaneous injection Inject 100 mg under the skin every 2 (two) months Medication on hold until insurance clearance   • Probiotic Product (PROBIOTIC BLEND PO) Take by mouth   • psyllium (METAMUCIL) 58.6 % packet Take 1 packet by mouth 2 (two) times a day   • Tiadylt  MG 24 hr capsule TAKE 1 CAPSULE BY MOUTH EVERY DAY   • traMADol (Ultram) 50 mg tablet Take 1 tablet (50 mg total) by mouth every 6 (six) hours as needed for moderate pain   • traZODone (DESYREL) 50 mg tablet TAKE 0.5 TABLETS (25 MG TOTAL) BY MOUTH DAILY AT BEDTIME AS NEEDED FOR SLEEP   • Turmeric 400 MG CAPS Take by mouth 2 (two) times a day   • dicyclomine (BENTYL) 10 mg capsule TAKE 1 CAPSULE BY MOUTH 3 TIMES A DAY BEFORE MEALS (Patient not taking: Reported on 4/8/2024)   • oxyCODONE (Roxicodone) 5 immediate  "release tablet Take 1 tablet (5 mg total) by mouth every 4 (four) hours as needed for moderate pain for up to 25 doses Max Daily Amount: 30 mg (Patient not taking: Reported on 5/7/2024)     Allergies   Allergen Reactions   • Sulfa Antibiotics Hives     Immunization History   Administered Date(s) Administered   • COVID-19 J&J (Yas) vaccine 0.5 mL 03/08/2021, 11/24/2021   • COVID-19 Pfizer vac (Dylan-sucrose, gray cap) 12 yr+ IM 08/29/2022   • INFLUENZA 10/01/2015, 10/01/2018, 10/23/2021, 10/07/2022, 09/15/2023   • Influenza LAIV (Nasal) 10/20/2008   • Influenza Quadrivalent 3 years and older 10/23/2021   • Influenza Split 09/20/2011   • Influenza, seasonal, injectable, preservative free 10/17/2016, 09/10/2020   • Pneumococcal Conjugate Vaccine 20-valent (Pcv20), Polysace 01/09/2023   • Pneumococcal Polysaccharide PPV23 04/03/2000, 01/14/2009   • Td (adult), adsorbed 01/01/2002   • Tdap 05/10/2012   • Zoster Vaccine Recombinant 02/27/2020, 06/16/2020       Objective     /90 (BP Location: Left arm, Patient Position: Sitting, Cuff Size: Standard)   Pulse 86   Temp 97.6 °F (36.4 °C) (Temporal)   Resp 16   Ht 5' 6\" (1.676 m)   Wt 68 kg (150 lb)   SpO2 96%   BMI 24.21 kg/m²     Physical Exam  Constitutional:       General: She is not in acute distress.     Appearance: Normal appearance. She is not ill-appearing.   Cardiovascular:      Heart sounds: Normal heart sounds. No murmur heard.  Pulmonary:      Effort: Pulmonary effort is normal.      Breath sounds: Normal breath sounds.   Abdominal:      General: Bowel sounds are normal. There is no distension.      Palpations: Abdomen is soft.      Tenderness: There is no abdominal tenderness.      Comments: 144/84   Skin:     Comments: Circular scaly rash left dorsal foot/anterior ankle, approximately 3 cm, tenia versus eczema   Neurological:      General: No focal deficit present.      Mental Status: She is alert and oriented to person, place, and time. "   Psychiatric:         Mood and Affect: Mood normal.         Behavior: Behavior normal.       Diana Sam MD

## 2024-05-07 NOTE — ASSESSMENT & PLAN NOTE
Orthopedic surgery is concerned about bone density based on recent surgical intervention.  Bone density scan January 2023 reveals osteopenia.

## 2024-05-08 NOTE — TELEPHONE ENCOUNTER
Called and spoke with patient relaying dr smith message. Patient advised she is already seeing a new therapist.

## 2024-05-09 ENCOUNTER — APPOINTMENT (OUTPATIENT)
Dept: PHYSICAL THERAPY | Facility: REHABILITATION | Age: 69
End: 2024-05-09
Payer: MEDICARE

## 2024-05-11 NOTE — ASSESSMENT & PLAN NOTE
Blood pressure is on the high side of normal.  Patient follows with cardiology regarding further Rx adjustment.  She remains on Tiadylt   mg daily

## 2024-05-13 ENCOUNTER — APPOINTMENT (OUTPATIENT)
Dept: PHYSICAL THERAPY | Facility: REHABILITATION | Age: 69
End: 2024-05-13
Payer: MEDICARE

## 2024-05-15 ENCOUNTER — APPOINTMENT (OUTPATIENT)
Dept: PHYSICAL THERAPY | Facility: REHABILITATION | Age: 69
End: 2024-05-15
Payer: MEDICARE

## 2024-05-15 DIAGNOSIS — F51.04 PSYCHOPHYSIOLOGICAL INSOMNIA: ICD-10-CM

## 2024-05-16 RX ORDER — TRAZODONE HYDROCHLORIDE 50 MG/1
25 TABLET ORAL
Qty: 45 TABLET | Refills: 1 | Status: SHIPPED | OUTPATIENT
Start: 2024-05-16

## 2024-05-17 ENCOUNTER — OFFICE VISIT (OUTPATIENT)
Dept: OBGYN CLINIC | Facility: CLINIC | Age: 69
End: 2024-05-17

## 2024-05-17 VITALS — HEIGHT: 66 IN | WEIGHT: 150 LBS | BODY MASS INDEX: 24.11 KG/M2

## 2024-05-17 DIAGNOSIS — S46.011D TRAUMATIC TEAR OF RIGHT ROTATOR CUFF, UNSPECIFIED TEAR EXTENT, SUBSEQUENT ENCOUNTER: Primary | ICD-10-CM

## 2024-05-17 PROCEDURE — 99024 POSTOP FOLLOW-UP VISIT: CPT | Performed by: STUDENT IN AN ORGANIZED HEALTH CARE EDUCATION/TRAINING PROGRAM

## 2024-05-17 NOTE — PROGRESS NOTES
Shoulder Post Operative Visit     Assesment:     68 y.o. female 5 weeks s/p Surgical Arthroscopy of the right shoulder with rotator cuff repair and subacromial decompression, DOS: 4/15/2024    Plan:    Melita presents for 5-week postop visit.  She is overall doing well at this time.  She does exhibit some apprehension and anxiety over reinjury, however I reassured her exam is reassuring and within normal limits at this time.  I like her to discontinue sling use in 1 additional week and begin to wean out of it.  She was then progressed with active assisted and active range of motion with her physical therapist at their discretion.  I would like to see her back in approximately 7 weeks for reevaluation.    Post-Operative treatment:    PT for ROM and strengthening to shoulder, rotator cuff, scapular stabilizers.  Home exercise program for shoulder, including ROM and strenthening.  Instructions given to patient of what exercises to perform.  Let pain guide return to activities.    Imaging:    No imaging was available for review today.    Sling:  Sling for the next 2 weeks    DVT Prophylaxis:  Ambulation    Follow up:   7 weeks     Patient was advised that if they have any fevers, chills, chest pain, shortness of breath, redness or drainage from the incision, please let our office know immediately.        Chief Complaint   Patient presents with    Right Shoulder - Post-op     Sx: 04/15/24       History of Present Illness:    The patient is a 68 y.o. female who is being evaluated post operatively 5 weeks s/p Surgical Arthroscopy of the right shoulder with rotator cuff repair and subacromial decompression, DOS: 4/15/2024. Since the prior visit, She reports moderate improvement. She reports having an incident with physical therapy with a weighted kettle bell. She has since change physical therapists. She has been going to physical therapy working on passive range of motion. She does report mild pain after physical therapy.  "      The patient denies any fevers, chills, calf pain, chest pain/shortness of breath, redness or drainage from the incision.         I have reviewed the past medical, surgical, social and family history, medications and allergies as documented in the EMR.    Review of systems: ROS is negative other than that noted in the HPI.  Constitutional: Negative for fatigue and fever.       Physical Exam:    Height 5' 6\" (1.676 m), weight 68 kg (150 lb), not currently breastfeeding.    General/Constitutional: NAD, well developed, well nourished  HENT: Normocephalic, atraumatic  CV: Intact distal pulses, regular rate  Resp: No respiratory distress or labored breathing  Lymphatic: No lymphadenopathy palpated  Neuro: Alert and Oriented x 3, no focal deficits  Psych: Normal mood, normal affect, normal judgement, normal behavior  Skin: Warm, dry, no rashes, no erythema    Shoulder focused exam:        Visual inspection of the shoulder demonstrates normal contour without atrophy  No evidence of scapular dyskinesia or atrophy.  No scapular winging  Passive range of motion demonstrates forward flexion to 120, abduction to 90, external rotation is 30 with the arm the side   Strength not tested  No tenderness to palpation at the distal clavicle, AC joint, acromion, or scapular spine  No pain with cross-body adduction  No special tests performed    Incisions show no erythema, no drainage      UE NV Exam: +2 Radial pulses bilaterally  Sensation intact to light touch C5-T1 bilaterally, Radial/median/ulnar nerve motor intact      Scribe Attestation      I,:  Ethan Denis am acting as a scribe while in the presence of the attending physician.:       I,:  Joseph Larkin DO personally performed the services described in this documentation    as scribed in my presence.:                  "

## 2024-05-20 ENCOUNTER — APPOINTMENT (OUTPATIENT)
Dept: PHYSICAL THERAPY | Facility: REHABILITATION | Age: 69
End: 2024-05-20
Payer: MEDICARE

## 2024-05-20 ENCOUNTER — TELEPHONE (OUTPATIENT)
Age: 69
End: 2024-05-20

## 2024-05-20 NOTE — TELEPHONE ENCOUNTER
1st attempt. Called LM on VM to CB to schedule consultation.    Rheum Consult:Osteopenia, Psoriatic Arhritis

## 2024-05-29 ENCOUNTER — APPOINTMENT (OUTPATIENT)
Dept: PHYSICAL THERAPY | Facility: REHABILITATION | Age: 69
End: 2024-05-29
Payer: MEDICARE

## 2024-05-31 ENCOUNTER — APPOINTMENT (OUTPATIENT)
Dept: PHYSICAL THERAPY | Facility: REHABILITATION | Age: 69
End: 2024-05-31
Payer: MEDICARE

## 2024-07-03 ENCOUNTER — OFFICE VISIT (OUTPATIENT)
Dept: OBGYN CLINIC | Facility: CLINIC | Age: 69
End: 2024-07-03

## 2024-07-03 VITALS
RESPIRATION RATE: 16 BRPM | BODY MASS INDEX: 24.11 KG/M2 | HEIGHT: 66 IN | WEIGHT: 150 LBS | DIASTOLIC BLOOD PRESSURE: 86 MMHG | HEART RATE: 75 BPM | SYSTOLIC BLOOD PRESSURE: 139 MMHG

## 2024-07-03 DIAGNOSIS — S46.011D TRAUMATIC TEAR OF RIGHT ROTATOR CUFF, UNSPECIFIED TEAR EXTENT, SUBSEQUENT ENCOUNTER: Primary | ICD-10-CM

## 2024-07-03 DIAGNOSIS — G56.02 CARPAL TUNNEL SYNDROME OF LEFT WRIST: ICD-10-CM

## 2024-07-03 PROCEDURE — 99024 POSTOP FOLLOW-UP VISIT: CPT | Performed by: STUDENT IN AN ORGANIZED HEALTH CARE EDUCATION/TRAINING PROGRAM

## 2024-07-03 NOTE — PROGRESS NOTES
Shoulder Post Operative Visit     Assesment:     68 y.o. female nearly 3 months surgical Arthroscopy of the right shoulder with rotator cuff repair with dermal allograft augmentation and subacromial decompression, DOS: 4/15/2024    Plan:  The patient's diagnosis and treatment were discussed at length today. We discussed no treatment, non-operative treatment, and operative treatment.    Melita presents today for follow-up evaluation nearly 3-month status post right shoulder arthroscopy with rotator cuff repair with dermal allograft augmentation and subacromial decompression performed on 4/15/2024.  She is doing well at this time but demonstrates some stiffness and overall apprehension.  I did reassure her that she continues to make appropriate progressions with outpatient physical therapy and her motion and strength will continue to improve with this.  I did reassure her that some soreness and achiness from time to time is expected as she is still early in her recovery.  She should continue outpatient physical therapy according to rotator cuff repair protocol.  She should continue to comply with a 1 to 5 pound lifting restriction and did emphasize caution with picking up 5 pounds away from her body.  She can continue ice and over-the-counter medication as needed for pain relief.  I also did emphasize caution if she tends to ride a bike on the rail trail and recommended starting on a smooth uneven surface early.  She is to follow-up in approximately 10 weeks for reevaluation.    Post-Operative treatment:    Ice to shoulder 1-2 times daily, for 20 minutes at a time.  Continue outpatient physical therapy according to rotator cuff repair protocol.  Activity restrictions per protocol.    Imaging:    All imaging from today was reviewed by myself and explained to the patient.     Sling:  never, as they have completed this portion of the post op period.    DVT Prophylaxis:  Ambulation    Follow up:   10 weeks    Patient was  "advised that if they have any fevers, chills, chest pain, shortness of breath, redness or drainage from the incision, please let our office know immediately.        Chief Complaint   Patient presents with    Right Shoulder - Post-op       History of Present Illness:    The patient is a 68 y.o. female who is being evaluated post operatively nearly 3 months status post right shoulder arthroscopy with rotator cuff repair with dermal allograft augmentation and subacromial decompression performed on 4/15/2024.  She states that she does occasionally get some soreness and achiness in the anterior lateral aspect of her shoulder that is sharp and severe at times.  She states that she notices this most after prolonged outpatient physical therapy and home exercises.  She mentions that this does improve with rest.  She states that they have begun gentle strengthening with her.  She reports some difficulty with attempting to put deodorant on, reach behind her back, and reach the back of her head.  She denies any new injury or trauma to her shoulder.  She denies any numbness or tingling.      I have reviewed the past medical, surgical, social and family history, medications and allergies as documented in the EMR.    Review of systems: ROS is negative other than that noted in the HPI.  Constitutional: Negative for fatigue and fever.       Physical Exam:    Blood pressure 139/86, pulse 75, resp. rate 16, height 5' 6\" (1.676 m), weight 68 kg (150 lb), not currently breastfeeding.    General/Constitutional: NAD, well developed, well nourished  HENT: Normocephalic, atraumatic  CV: Intact distal pulses, regular rate  Resp: No respiratory distress or labored breathing  Lymphatic: No lymphadenopathy palpated  Neuro: Alert and Oriented x 3, no focal deficits  Psych: Normal mood, normal affect, normal judgement, normal behavior  Skin: Warm, dry, no rashes, no erythema    Shoulder focused exam:        Visual inspection of the shoulder " demonstrates normal contour without atrophy  No evidence of scapular dyskinesia or atrophy.  No scapular winging  Incisions appear healed.  Compartments soft and compressible capillary refill is brisk.  Motor and sensory function intact.  Active range of motion forward elevation of 110 degrees and 160 passively, passive external rotation with the arm at the side is 20 degrees, ER in abduction is 60,  internal rotation to sacrum  Strength not tested  No special test performed      UE NV Exam: +2 Radial pulses bilaterally  Sensation intact to light touch C5-T1 bilaterally, Radial/median/ulnar nerve motor intact      Scribe Attestation      I,:  Rigo Olivia am acting as a scribe while in the presence of the attending physician.:       I,:  Joseph Larkin, DO personally performed the services described in this documentation    as scribed in my presence.:

## 2024-07-22 ENCOUNTER — TELEPHONE (OUTPATIENT)
Age: 69
End: 2024-07-22

## 2024-07-22 NOTE — TELEPHONE ENCOUNTER
Patient call about getting a Done Density scan and they send paper work for the doctor to sign for the patient to be able to get a Bone density scan. Patient would also need a script for the Bone density scan and would like to get it mail to her.If you have any other question for patient please feel free to contact the patient thank you.

## 2024-07-22 NOTE — TELEPHONE ENCOUNTER
Melita called in with BP readings. Has been out of sling for a while now    BP's   126/87 HR 67, 121/92 HR 72, 124/81 HR 74, 116/83 HR 79, 120/82 HR 79    Advised that appears would like 2 weeks, will continue to monitor BP's.    She feels well, not having any cardiac s/s.    Next appt w/you 4/23/2025    Please advise is wants any changes at this time

## 2024-07-23 NOTE — TELEPHONE ENCOUNTER
Left message on mailbox relaying Dr. Moe message as given , also provided C/B number if any questions or concerns.

## 2024-09-11 ENCOUNTER — OFFICE VISIT (OUTPATIENT)
Dept: OBGYN CLINIC | Facility: CLINIC | Age: 69
End: 2024-09-11
Payer: MEDICARE

## 2024-09-11 VITALS — WEIGHT: 150 LBS | BODY MASS INDEX: 24.11 KG/M2 | HEIGHT: 66 IN

## 2024-09-11 DIAGNOSIS — S46.011D TRAUMATIC TEAR OF RIGHT ROTATOR CUFF, UNSPECIFIED TEAR EXTENT, SUBSEQUENT ENCOUNTER: Primary | ICD-10-CM

## 2024-09-11 PROCEDURE — 99213 OFFICE O/P EST LOW 20 MIN: CPT | Performed by: STUDENT IN AN ORGANIZED HEALTH CARE EDUCATION/TRAINING PROGRAM

## 2024-09-11 NOTE — PROGRESS NOTES
Shoulder Post Operative Visit     Assesment:     68 y.o. female nearly 5 months surgical Arthroscopy of the right shoulder with rotator cuff repair with dermal allograft augmentation and subacromial decompression, DOS: 4/15/2024    Plan:  The patient's diagnosis and treatment were discussed at length today. We discussed no treatment, non-operative treatment, and operative treatment.    Melita returns today for continued follow-up.  She is overall doing well at this time however does endorse a slight setback over the last several weeks with some increasing achiness and soreness.  She denies any new trauma or injury.  I feel her symptoms are related to some muscle weakness that she returns to more activities and I think she would benefit from continued physical therapy for periscapular range of motion and strengthening.  I explained that if she has worsening pain or weakness or fails to improve over the next several weeks we can Trang consider an MRI to evaluate the status of her repair.  She is in agreement with this plan.    Post-Operative treatment:    Ice to shoulder 1-2 times daily, for 20 minutes at a time.  Continue outpatient physical therapy according to rotator cuff repair protocol.  Activity restrictions per protocol.    Imaging:    All imaging from today was reviewed by myself and explained to the patient.     Sling:  never, as they have completed this portion of the post op period.    DVT Prophylaxis:  Ambulation    Follow up:   8 weeks    Patient was advised that if they have any fevers, chills, chest pain, shortness of breath, redness or drainage from the incision, please let our office know immediately.        Chief Complaint   Patient presents with    Right Shoulder - Follow-up, Pain     Patient states it feels weak        History of Present Illness:    The patient is a 68 y.o. female who is being evaluated post operatively nearly 3 months status post right shoulder arthroscopy with rotator cuff repair  "with dermal allograft augmentation and subacromial decompression performed on 4/15/2024.  She states that she does occasionally get some soreness and achiness in the anterior lateral aspect of her shoulder that is sharp and severe at times.  She states that she notices this most after prolonged outpatient physical therapy and home exercises.  She mentions that this does improve with rest.  She states that they have begun gentle strengthening with her.  She reports some difficulty with attempting to put deodorant on, reach behind her back, and reach the back of her head.  She denies any new injury or trauma to her shoulder.  She denies any numbness or tingling.    9/11/2024 update  Patient returns a for follow-up regarding her right shoulder.  She is overall pleased with her progress however does not Dors a slight setback over the last several weeks.  She denies any traumatic incident or falls.  She states that when she begins moving the arm more actively she has some discomfort however this resolves and improves after continued warm up and activity levels.  She also describes some discomfort down the lateral arm especially at night that does not wake her from her sleep but does bother her.  She is taking Tylenol and ibuprofen sparingly which are alleviating.  She has been extremely compliant with outpatient physical therapy and feels she is getting significant benefit from continued therapy.  She has returned to some outdoor bike riding as well as some swimming over the summer but has since stopped that.      I have reviewed the past medical, surgical, social and family history, medications and allergies as documented in the EMR.    Review of systems: ROS is negative other than that noted in the HPI.  Constitutional: Negative for fatigue and fever.       Physical Exam:    Height 5' 6\" (1.676 m), weight 68 kg (150 lb), not currently breastfeeding.    General/Constitutional: NAD, well developed, well nourished  HENT: " Normocephalic, atraumatic  CV: Intact distal pulses, regular rate  Resp: No respiratory distress or labored breathing  Lymphatic: No lymphadenopathy palpated  Neuro: Alert and Oriented x 3, no focal deficits  Psych: Normal mood, normal affect, normal judgement, normal behavior  Skin: Warm, dry, no rashes, no erythema    Shoulder focused exam:        Visual inspection of the shoulder demonstrates normal contour without atrophy  No evidence of scapular dyskinesia or atrophy.  No scapular winging  Incisions appear healed.  Compartments soft and compressible capillary refill is brisk.  Motor and sensory function intact.  Active range of motion forward elevation of 145 degrees and 170 passively, passive external rotation with the arm at the side is 40 degrees, ER in abduction is 80,  internal rotation to lower thoracic  Strength is 4/5 throughout  No drop arm present      UE NV Exam: +2 Radial pulses bilaterally  Sensation intact to light touch C5-T1 bilaterally, Radial/median/ulnar nerve motor intact      Scribe Attestation      I,:   am acting as a scribe while in the presence of the attending physician.:       I,:   personally performed the services described in this documentation    as scribed in my presence.:

## 2024-10-11 ENCOUNTER — OFFICE VISIT (OUTPATIENT)
Dept: URGENT CARE | Facility: CLINIC | Age: 69
End: 2024-10-11
Payer: MEDICARE

## 2024-10-11 VITALS
SYSTOLIC BLOOD PRESSURE: 170 MMHG | HEART RATE: 94 BPM | OXYGEN SATURATION: 96 % | HEIGHT: 66 IN | RESPIRATION RATE: 16 BRPM | WEIGHT: 170.2 LBS | DIASTOLIC BLOOD PRESSURE: 120 MMHG | TEMPERATURE: 98.1 F | BODY MASS INDEX: 27.35 KG/M2

## 2024-10-11 DIAGNOSIS — J02.9 SORE THROAT: Primary | ICD-10-CM

## 2024-10-11 LAB — S PYO AG THROAT QL: NEGATIVE

## 2024-10-11 PROCEDURE — 99213 OFFICE O/P EST LOW 20 MIN: CPT | Performed by: NURSE PRACTITIONER

## 2024-10-11 PROCEDURE — G0463 HOSPITAL OUTPT CLINIC VISIT: HCPCS | Performed by: NURSE PRACTITIONER

## 2024-10-11 PROCEDURE — 87070 CULTURE OTHR SPECIMN AEROBIC: CPT | Performed by: NURSE PRACTITIONER

## 2024-10-11 PROCEDURE — 87880 STREP A ASSAY W/OPTIC: CPT | Performed by: NURSE PRACTITIONER

## 2024-10-11 NOTE — PROGRESS NOTES
"  [facility]        NAME: Rosalind Guerrero is a 68 y.o. female  : 1955    MRN: [id number]  DATE: 2024  TIME: 7:59 PM    Assessment and Plan   Sore throat [J02.9]  1. Sore throat  POCT rapid ANTIGEN strepA    Throat culture            Patient Instructions       Rapid strep is negative  Will send for culture  OTC med for congestion; Mucinex DM  Follow up with PCP in 3-5 days.  Proceed to  ER if symptoms worsen.    If tests have been performed at [facility], our office will contact you with results if changes need to be made to the care plan discussed with you at the visit.  [facility]    Chief Complaint     Chief Complaint   Patient presents with    Sore Throat     Scratchy throat x1 week. Patient was at dental appointment 1 week ago and dentist noticed a \"yellow nodule\" on tonsil. Patient denies fevers. Patient states her throat feels \"thick and irritated.\"         History of Present Illness       HPI  Reports sore throat for a couple of days. Had scratchy throat for about a week now. Says dentist noticed a yellow nodule on the throat a week ago.      Review of Systems   Review of Systems   Constitutional:  Negative for fever.   HENT:  Positive for postnasal drip and sore throat. Negative for ear pain and trouble swallowing.    Respiratory:  Negative for cough.    Cardiovascular:  Negative for chest pain.   Gastrointestinal:  Negative for constipation and vomiting.         Current Medications       Current Outpatient Medications:     Cholecalciferol (Vitamin D3) 50 MCG ( UT) capsule, , Disp: , Rfl:     fluticasone (FLONASE) 50 mcg/act nasal spray, SPRAY 2 SPRAYS INTO EACH NOSTRIL EVERY DAY (Patient taking differently: if needed), Disp: 48 mL, Rfl: 1    Probiotic Product (PROBIOTIC BLEND PO), Take by mouth, Disp: , Rfl:     psyllium (METAMUCIL) 58.6 % packet, Take 1 packet by mouth 2 (two) times a day, Disp: , Rfl:     Tiadylt  MG 24 hr " capsule, TAKE 1 CAPSULE BY MOUTH EVERY DAY, Disp: 90 capsule, Rfl: 3    traZODone (DESYREL) 50 mg tablet, Take 0.5 tablets (25 mg total) by mouth daily at bedtime as needed for sleep, Disp: 45 tablet, Rfl: 1    Turmeric 400 MG CAPS, Take by mouth 2 (two) times a day, Disp: , Rfl:     clotrimazole-betamethasone (LOTRISONE) 1-0.05 % cream, Apply topically 2 (two) times a day (Patient not taking: Reported on 10/11/2024), Disp: 45 g, Rfl: 1    dicyclomine (BENTYL) 10 mg capsule, TAKE 1 CAPSULE BY MOUTH 3 TIMES A DAY BEFORE MEALS (Patient not taking: Reported on 4/8/2024), Disp: 270 capsule, Rfl: 1    guselkumab (TREMFYA) subcutaneous injection, Inject 100 mg under the skin every 2 (two) months Medication on hold until insurance clearance (Patient not taking: Reported on 9/11/2024), Disp: , Rfl:     traMADol (Ultram) 50 mg tablet, Take 1 tablet (50 mg total) by mouth every 6 (six) hours as needed for moderate pain (Patient not taking: Reported on 10/11/2024), Disp: 25 tablet, Rfl: 0    Current Allergies     Allergies as of 10/11/2024 - Reviewed 09/11/2024   Allergen Reaction Noted    Sulfa antibiotics Hives 03/10/2022            The following portions of the patient's history were reviewed and updated as appropriate: allergies, current medications, past family history, past medical history, past social history, past surgical history and problem list.     Past Medical History:   Diagnosis Date    Anxiety     Arthritis     Heart valve disease     Mitro valve prolapse    Hypertension     Irritable bowel disease     Psoriasis     Psoriatic arthritis (HCC)     Varicella        Past Surgical History:   Procedure Laterality Date    JOINT REPLACEMENT Left 2016    LEFT OOPHORECTOMY Left 2007    benign cyst    OOPHORECTOMY      [place] SURGICAL ARTHROSCOPY SHERRON W/CORACOACRM LIGM RLS Right 4/15/2024    Procedure: ARTHROSCOPIC SUBACROMIAL DECOMPRESSION;  Surgeon: Joseph Larkin DO;  [facility];  Service: Orthopedics     "[place] SURGICAL ARTHROSCOPY SHOULDER W/ROTATOR CUFF RPR Right 4/15/2024    Procedure: SHOULDER ARTHROSCOPIC ROTATOR CUFF REPAIR;  Surgeon: Joseph Larkin DO;  [facility];  Service: Orthopedics    REPLACEMENT TOTAL KNEE Left        Family History   Problem Relation Age of Onset    Stroke Mother     Supraventricular tachycardia Mother     Psoriasis Mother     Psoriatic Arthritis Mother     Hypertension Mother         Mother and father    Dementia Mother         Mother    Squamous cell carcinoma Mother         skin- unknown age    Hypertension Father     Heart disease Father     Heart attack Father     Kidney cancer Sister         20's    No Known Problems Sister     No Known Problems Daughter     Colon cancer Maternal Grandmother 71    Cancer Maternal Grandmother         Colon cancer    No Known Problems Maternal Grandfather     No Known Problems Paternal Grandmother     Prostate cancer Paternal Grandfather         90's    No Known Problems Maternal Aunt     No Known Problems Maternal Aunt          Medications have been verified.        Objective   BP (!) 170/120 (BP Location: Right arm, Patient Position: Sitting, Cuff Size: Adult)   Pulse 94   Temp 98.1 °F (36.7 °C) (Tympanic)   Resp 16   Ht 5' 6\" (1.676 m)   Wt 77.2 kg (170 lb 3.2 oz)   SpO2 96%   BMI 27.47 kg/m²   No LMP recorded. Patient is postmenopausal.       Physical Exam     Physical Exam  Constitutional:       General: She is not in acute distress.     Appearance: She is not ill-appearing.   HENT:      Right Ear: Tympanic membrane normal.      Left Ear: Tympanic membrane normal.      Nose: Rhinorrhea present.      Mouth/Throat:      Pharynx: Posterior oropharyngeal erythema present.      Tonsils: No tonsillar exudate. 0 on the right. 1+ on the left.   Cardiovascular:      Rate and Rhythm: Regular rhythm.   Pulmonary:      Breath sounds: Normal breath sounds.                   "

## 2024-10-13 LAB — BACTERIA THROAT CULT: NORMAL

## 2024-10-14 ENCOUNTER — NURSE TRIAGE (OUTPATIENT)
Age: 69
End: 2024-10-14

## 2024-10-14 ENCOUNTER — DOCUMENTATION (OUTPATIENT)
Dept: ADMINISTRATIVE | Facility: OTHER | Age: 69
End: 2024-10-14

## 2024-10-14 VITALS — DIASTOLIC BLOOD PRESSURE: 73 MMHG | SYSTOLIC BLOOD PRESSURE: 117 MMHG

## 2024-10-14 NOTE — PROGRESS NOTES
General Cardiology Note  Rosalind ABRAHAM Yolanda  1955  [id number]  [facility] [place]  [address]  [place] [id number] [phone]    Referring Provider - No ref. provider found  Chief Complaint   Patient presents with    Follow-up     Patient experiencing elevated BP, last seen by Dr. Moe 3/19/2024    Saturday; 135/98 , 129/88/ 129/82    Sunday: 125/82 , 117/73    Monday: 112/81    A lot of stress in life going on      Assessment & Plan  Hypertension, essential, benign  BP today is 134/88  Since urgent care visit BP is down to 110's-120's/70-80s  Episode of high blood pressure likely in the setting of stress and illness.  Continue current medication regimen.  Continue to monitor BP at home  Continue stress reduction  Palpitations  Reports occasional flutter sensation for years but most recently occurring more often  EKG today shows normal sinus rhythm  Palpitations likely due to stress response.  Patient reports that they are not bothersome. If palpitations get worse can consider further monitoring in the future  Continue with stress management  BMI 27.0-27.9,adult  20 pound weight gain after rotator cuff surgery  Discussed diet and lifestyle changes.  Reinforced stress management    Will RTO in 6 months or sooner if necessary. Will call with any concerns.     Interval History: 68 y.o.  female  with PMH hypertension, a family history of stroke and CAD but not premature, elevated calcium score of 186. Follows with Dr. Moe    She presents for follow-up due to elevated /120 at urgent care on Friday 10/11/24. Patient was there for sore throat and was diagnosed with post nasal drip. BP stabilized the next day. She reports significant stress at home.  Her mom had a stroke and  is passing away, her son's breakup is also affecting her.  She had right rotator cuff surgery in April.  States that she is back to biking but has not returned to playing pickleball.  She does  go for walks but notes that she is starting to feel a little winded and her legs are heavier.  She again gained about 20 lbs after surgery.  States that diet is usually healthy but lately she has been eating a lot of sweets and ice cream.   She reports occasional palpitations.  She has had flutter sensation for years but notes it has been getting more frequent because of her stress    Patient Active Problem List    Diagnosis Date Noted    Palpitations 10/15/2024    BMI 27.0-27.9,adult 10/15/2024    Traumatic tear of right rotator cuff, unspecified tear extent, subsequent encounter 03/22/2024    Restless leg syndrome 03/26/2023    Osteopenia 03/26/2023    Bilateral bunions 04/05/2022    Dense breast tissue 04/05/2022    Family history of kidney cancer 04/05/2022    Family history of colon cancer 04/05/2022    Family history of COPD (chronic obstructive pulmonary disease) 04/05/2022    Family history of macular degeneration 04/05/2022    History of partial knee replacement 04/05/2022    Psychophysiological insomnia 03/15/2022    Psoriatic arthritis (HCC) 03/10/2022    Hypertension, essential, benign 03/10/2022    Irritable bowel syndrome 07/26/2014    Pure hypercholesterolemia 07/26/2014    Allergic rhinitis 07/25/2014     Past Medical History:   Diagnosis Date    Anxiety     Arthritis     Heart valve disease     Mitro valve prolapse    Hypertension     Irritable bowel disease     Psoriasis     Psoriatic arthritis (HCC)     Varicella      Social History     Tobacco Use    Smoking status: Never    Smokeless tobacco: Never   Vaping Use    Vaping status: Never Used   Substance Use Topics    Alcohol use: Not Currently     Alcohol/week: 1.0 standard drink of alcohol     Types: 1 Glasses of wine per week     Comment: With dinner    Drug use: Not Currently     Types: Marijuana     Comment: Many years ago      Family History   Problem Relation Age of Onset    Stroke Mother     Supraventricular tachycardia Mother     Psoriasis  Mother     Psoriatic Arthritis Mother     Hypertension Mother         Mother and father    Dementia Mother         Mother    Squamous cell carcinoma Mother         skin- unknown age    Hypertension Father     Heart disease Father     Heart attack Father     Kidney cancer Sister         20's    No Known Problems Sister     No Known Problems Daughter     Colon cancer Maternal Grandmother 71    Cancer Maternal Grandmother         Colon cancer    No Known Problems Maternal Grandfather     No Known Problems Paternal Grandmother     Prostate cancer Paternal Grandfather         90's    No Known Problems Maternal Aunt     No Known Problems Maternal Aunt      Past Surgical History:   Procedure Laterality Date    JOINT REPLACEMENT Left 2016    LEFT OOPHORECTOMY Left 2007    benign cyst    OOPHORECTOMY      [place] SURGICAL ARTHROSCOPY SHERRON W/CORACOACRM LIGM RLS Right 4/15/2024    Procedure: ARTHROSCOPIC SUBACROMIAL DECOMPRESSION;  Surgeon: Joseph Larkin DO;  [facility];  Service: Orthopedics    MO SURGICAL ARTHROSCOPY SHOULDER W/ROTATOR CUFF RPR Right 4/15/2024    Procedure: SHOULDER ARTHROSCOPIC ROTATOR CUFF REPAIR;  Surgeon: Joseph Larkin DO;  [facility];  Service: Orthopedics    REPLACEMENT TOTAL KNEE Left        Current Outpatient Medications:     Cholecalciferol (Vitamin D3) 50 MCG (2000 UT) capsule, , Disp: , Rfl:     fluticasone (FLONASE) 50 mcg/act nasal spray, SPRAY 2 SPRAYS INTO EACH NOSTRIL EVERY DAY (Patient taking differently: if needed), Disp: 48 mL, Rfl: 1    Probiotic Product (PROBIOTIC BLEND PO), Take by mouth, Disp: , Rfl:     psyllium (METAMUCIL) 58.6 % packet, Take 1 packet by mouth 2 (two) times a day, Disp: , Rfl:     Tiadylt  MG 24 hr capsule, TAKE 1 CAPSULE BY MOUTH EVERY DAY, Disp: 90 capsule, Rfl: 3    traZODone (DESYREL) 50 mg tablet, Take 0.5 tablets (25 mg total) by mouth daily at bedtime as needed for sleep, Disp: 45 tablet, Rfl: 1    Turmeric 400 MG CAPS,  "Take by mouth 2 (two) times a day, Disp: , Rfl:     clotrimazole-betamethasone (LOTRISONE) 1-0.05 % cream, Apply topically 2 (two) times a day (Patient not taking: Reported on 10/11/2024), Disp: 45 g, Rfl: 1    dicyclomine (BENTYL) 10 mg capsule, TAKE 1 CAPSULE BY MOUTH 3 TIMES A DAY BEFORE MEALS (Patient not taking: Reported on 4/8/2024), Disp: 270 capsule, Rfl: 1    guselkumab (TREMFYA) subcutaneous injection, Inject 100 mg under the skin every 2 (two) months Medication on hold until insurance clearance (Patient not taking: Reported on 9/11/2024), Disp: , Rfl:     traMADol (Ultram) 50 mg tablet, Take 1 tablet (50 mg total) by mouth every 6 (six) hours as needed for moderate pain (Patient not taking: Reported on 10/11/2024), Disp: 25 tablet, Rfl: 0    Allergies   Allergen Reactions    Sulfa Antibiotics Hives       Vitals:    10/15/24 1002   BP: 134/88   BP Location: Left arm   Patient Position: Sitting   Cuff Size: Standard   Pulse: 86   SpO2: 97%   Weight: 76.2 kg (168 lb)   Height: 5' 6\" (1.676 m)        Vitals:    10/15/24 1002   Weight: 76.2 kg (168 lb)      Height: 5' 6\" (167.6 cm)   Body mass index is 27.12 kg/m².    Labs:     Chemistry        Component Value Date/Time    K 4.9 02/23/2024 0803    K 4.4 03/16/2023 1210     02/23/2024 0803     03/16/2023 1210    CO2 25 02/23/2024 0803    CO2 27 03/16/2023 1210    BUN 15 02/23/2024 0803    BUN 11 03/16/2023 1210    CREATININE 0.88 02/23/2024 0803    CREATININE 0.89 03/16/2023 1210        Component Value Date/Time    CALCIUM 9.5 02/23/2024 0803    CALCIUM 9.5 03/16/2023 1210    ALKPHOS 78 02/23/2024 0803    ALKPHOS 82 03/16/2023 1210    AST 20 02/23/2024 0803    AST 15 03/16/2023 1210    ALT 15 02/23/2024 0803    ALT 12 03/16/2023 1210          No results found for: \"HGBA1C\"   No results found for: \"CHOL\"  Lab Results   Component Value Date    HDL 89 02/23/2024    HDL 82 04/12/2023    HDL 85 01/11/2023     Lab Results   Component Value Date    LDLCALC " "123 (H) 02/23/2024    LDLCALC 87 04/12/2023    LDLCALC 136 (H) 01/11/2023     Lab Results   Component Value Date    TRIG 101 02/23/2024    TRIG 70 04/12/2023    TRIG 101 01/11/2023     No results found for: \"CHOLHDL\"    Imaging: No results found.    EKG: Normal sinus rhythm.  76 bpm  Reviewed by COREY Quinones    Review of Systems   Constitutional: Positive for weight gain. Negative for chills, diaphoresis, fever and malaise/fatigue.   Cardiovascular:  Positive for dyspnea on exertion (slight) and palpitations. Negative for chest pain, leg swelling, orthopnea and syncope.   Respiratory:  Negative for cough and shortness of breath.    Gastrointestinal:  Negative for abdominal pain, nausea and vomiting.   Neurological:  Negative for dizziness, headaches and light-headedness.   Psychiatric/Behavioral:  Negative for altered mental status.    All other systems reviewed and are negative.    Except as noted in HPI, is otherwise reviewed in detail and a 12 point review of systems is negative.    Physical Exam  Vitals reviewed.   Constitutional:       General: She is not in acute distress.     Appearance: Normal appearance. She is not diaphoretic.   HENT:      Head: Normocephalic and atraumatic.   Eyes:      General: No scleral icterus.     Extraocular Movements: Extraocular movements intact.      Pupils: Pupils are equal, round, and reactive to light.   Cardiovascular:      Rate and Rhythm: Normal rate and regular rhythm.      Pulses: Normal pulses.           Radial pulses are 2+ on the right side and 2+ on the left side.      Heart sounds: Normal heart sounds, S1 normal and S2 normal. No murmur heard.  Pulmonary:      Effort: Pulmonary effort is normal. No tachypnea or respiratory distress.      Breath sounds: Normal breath sounds. No wheezing or rales.   Abdominal:      General: Bowel sounds are normal. There is no distension.      Palpations: Abdomen is soft.   Musculoskeletal:      Right lower leg: No edema.      " Left lower leg: No edema.   Skin:     General: Skin is warm and dry.      Capillary Refill: Capillary refill takes less than 2 seconds.   Neurological:      General: No focal deficit present.      Mental Status: She is alert and oriented to person, place, and time.      Gait: Gait normal.   Psychiatric:         Mood and Affect: Mood normal.         Behavior: Behavior normal.          **Please Note: This note may have been constructed using a voice recognition system**     Thank you for the opportunity to participate in the care of this patient.   COREY Quinones

## 2024-10-14 NOTE — TELEPHONE ENCOUNTER
"Chief Complaint: Elevated BP    History of Present Illness (HPI):     VS/Weight: BP elevated at urgent care 170/120 3 days ago Patient there for sore throat      Risk Factors: Hypertension      Medicine: Tiadylt 120 mg daily, taken at 8 PM    Upcoming Office Visit: Yes  Scheduled with Mitra Epps on 10/23    Last Office Visit: March, 2024    Additional Comments: Patient was in Urgent Care on 10/11 and /120.  At home diastolic BP 73-92.  BP last night on home cuff 125/82, 117/73   HR 80s.  Patient due for a 6 m follow up. Scheduled with Nicole SAINI on 10/23.  Offered NP visit this week, but patient declined due to other commitments.            Answer Assessment - Initial Assessment Questions  1. BLOOD PRESSURE: \"What is the blood pressure?\" \"Did you take at least two measurements 5 minutes apart?\"      BP elevated at urgent care visit on 10/11: 170/120  2. ONSET: \"When did you take your blood pressure?\"      Patient takes in the evenings at home.    3. HOW: \"How did you obtain the blood pressure?\" (e.g., visiting nurse, automatic home BP monitor)      BP taken at Urgent Care. At home, BP on home automatic BP cuff used on upper arm  4. HISTORY: \"Do you have a history of high blood pressure?\"  Yes      5. MEDICATIONS: \"Are you taking any medications for blood pressure?\" \"Have you missed any doses recently?\"      Tiadylt  mg daily.  No missed doses  6. OTHER SYMPTOMS: \"Do you have any symptoms?\" (e.g., headache, chest pain, blurred vision, difficulty breathing, weakness)  Patient denies    Protocols used: Blood Pressure - High-ADULT-OH    "

## 2024-10-14 NOTE — PROGRESS NOTES
Blood pressure elevated  Appointment department: [facility]  Appointment provider: * No providers found *  Blood pressure   10/11/24 1951 (!) 170/120   10/11/24 1944 (!) 170/110     10/12/2024 135/98  10/12/2024 129/82 (few minutes later)    10/13/2024 117/73      10/14/24 8:25 AM    Patient was called after the Urgent Care visit . Patient does not have a home BP machine to re-take BP.    And will be following up with her pcp and cardiologist     Thank you.  Ghislaine Patterson MA  [facility]

## 2024-10-14 NOTE — TELEPHONE ENCOUNTER
Not surprised by her temporary high BP in the context of an illness, with or without OTC decongestants, which she should be advised to avoid, especially containing phenylephrine or pseudoephedrine.  Next week's visit is fine especially knowing her home blood pressures are improved

## 2024-10-15 ENCOUNTER — OFFICE VISIT (OUTPATIENT)
Dept: CARDIOLOGY CLINIC | Facility: CLINIC | Age: 69
End: 2024-10-15
Payer: MEDICARE

## 2024-10-15 VITALS
OXYGEN SATURATION: 97 % | DIASTOLIC BLOOD PRESSURE: 88 MMHG | HEIGHT: 66 IN | SYSTOLIC BLOOD PRESSURE: 134 MMHG | WEIGHT: 168 LBS | BODY MASS INDEX: 27 KG/M2 | HEART RATE: 86 BPM

## 2024-10-15 DIAGNOSIS — I10 HYPERTENSION, ESSENTIAL, BENIGN: Primary | Chronic | ICD-10-CM

## 2024-10-15 DIAGNOSIS — R00.2 PALPITATIONS: ICD-10-CM

## 2024-10-15 PROCEDURE — 93000 ELECTROCARDIOGRAM COMPLETE: CPT

## 2024-10-15 PROCEDURE — 99214 OFFICE O/P EST MOD 30 MIN: CPT

## 2024-10-15 NOTE — ASSESSMENT & PLAN NOTE
Reports occasional flutter sensation for years but most recently occurring more often  EKG today shows normal sinus rhythm  Palpitations likely due to stress response.  Patient reports that they are not bothersome. If palpitations get worse can consider further monitoring in the future  Continue with stress management

## 2024-10-15 NOTE — ASSESSMENT & PLAN NOTE
20 pound weight gain after rotator cuff surgery  Discussed diet and lifestyle changes.  Reinforced stress management

## 2024-10-15 NOTE — PATIENT INSTRUCTIONS
No change in medication regimen  Continue to monitor your blood pressure at home.Keep a diary of your blood pressure readings  Continue heart healthy diet by limiting saturated fat and added sugars while increasing fiber and healthy fats.  Avoid canned foods, fast food/Chinese food, and processed meats (hot dogs, lunch meat, and sausage etc.). Caution with condiments.    Bring complete list of medications to your follow-up appointment.   Follow-up in 6 months with Dr Moe or earlier if any concerns  Please call the office if you have any questions

## 2024-10-15 NOTE — ASSESSMENT & PLAN NOTE
BP today is 134/88  Since urgent care visit BP is down to 110's-120's/70-80s  Episode of high blood pressure likely in the setting of stress and illness.  Continue current medication regimen.  Continue to monitor BP at home  Continue stress reduction

## 2024-10-29 ENCOUNTER — TRANSCRIBE ORDERS (OUTPATIENT)
Dept: LAB | Facility: CLINIC | Age: 69
End: 2024-10-29

## 2024-10-29 ENCOUNTER — APPOINTMENT (OUTPATIENT)
Dept: LAB | Facility: CLINIC | Age: 69
End: 2024-10-29
Payer: MEDICARE

## 2024-10-29 DIAGNOSIS — Z79.899 NEED FOR PROPHYLACTIC CHEMOTHERAPY: Primary | ICD-10-CM

## 2024-10-29 DIAGNOSIS — Z79.899 NEED FOR PROPHYLACTIC CHEMOTHERAPY: ICD-10-CM

## 2024-10-29 LAB
ANION GAP SERPL CALCULATED.3IONS-SCNC: 10 MMOL/L (ref 4–13)
BASOPHILS # BLD AUTO: 0.05 THOUSANDS/ΜL (ref 0–0.1)
BASOPHILS NFR BLD AUTO: 1 % (ref 0–1)
BUN SERPL-MCNC: 13 MG/DL (ref 5–25)
CALCIUM SERPL-MCNC: 9.2 MG/DL (ref 8.4–10.2)
CHLORIDE SERPL-SCNC: 105 MMOL/L (ref 96–108)
CO2 SERPL-SCNC: 25 MMOL/L (ref 21–32)
CREAT SERPL-MCNC: 0.81 MG/DL (ref 0.6–1.3)
EOSINOPHIL # BLD AUTO: 0.05 THOUSAND/ΜL (ref 0–0.61)
EOSINOPHIL NFR BLD AUTO: 1 % (ref 0–6)
ERYTHROCYTE [DISTWIDTH] IN BLOOD BY AUTOMATED COUNT: 12.9 % (ref 11.6–15.1)
GFR SERPL CREATININE-BSD FRML MDRD: 75 ML/MIN/1.73SQ M
GLUCOSE P FAST SERPL-MCNC: 93 MG/DL (ref 65–99)
HCT VFR BLD AUTO: 45.5 % (ref 34.8–46.1)
HGB BLD-MCNC: 15.2 G/DL (ref 11.5–15.4)
IMM GRANULOCYTES # BLD AUTO: 0.01 THOUSAND/UL (ref 0–0.2)
IMM GRANULOCYTES NFR BLD AUTO: 0 % (ref 0–2)
LYMPHOCYTES # BLD AUTO: 1.31 THOUSANDS/ΜL (ref 0.6–4.47)
LYMPHOCYTES NFR BLD AUTO: 25 % (ref 14–44)
MCH RBC QN AUTO: 31.6 PG (ref 26.8–34.3)
MCHC RBC AUTO-ENTMCNC: 33.4 G/DL (ref 31.4–37.4)
MCV RBC AUTO: 95 FL (ref 82–98)
MONOCYTES # BLD AUTO: 0.47 THOUSAND/ΜL (ref 0.17–1.22)
MONOCYTES NFR BLD AUTO: 9 % (ref 4–12)
NEUTROPHILS # BLD AUTO: 3.42 THOUSANDS/ΜL (ref 1.85–7.62)
NEUTS SEG NFR BLD AUTO: 64 % (ref 43–75)
NRBC BLD AUTO-RTO: 0 /100 WBCS
PLATELET # BLD AUTO: 325 THOUSANDS/UL (ref 149–390)
PMV BLD AUTO: 9.8 FL (ref 8.9–12.7)
POTASSIUM SERPL-SCNC: 4 MMOL/L (ref 3.5–5.3)
RBC # BLD AUTO: 4.81 MILLION/UL (ref 3.81–5.12)
SODIUM SERPL-SCNC: 140 MMOL/L (ref 135–147)
WBC # BLD AUTO: 5.31 THOUSAND/UL (ref 4.31–10.16)

## 2024-10-29 PROCEDURE — 85025 COMPLETE CBC W/AUTO DIFF WBC: CPT

## 2024-10-29 PROCEDURE — 86480 TB TEST CELL IMMUN MEASURE: CPT

## 2024-10-29 PROCEDURE — 36415 COLL VENOUS BLD VENIPUNCTURE: CPT

## 2024-10-29 PROCEDURE — 80048 BASIC METABOLIC PNL TOTAL CA: CPT

## 2024-10-30 ENCOUNTER — OFFICE VISIT (OUTPATIENT)
Dept: OBGYN CLINIC | Facility: CLINIC | Age: 69
End: 2024-10-30
Payer: MEDICARE

## 2024-10-30 VITALS — WEIGHT: 168 LBS | HEIGHT: 66 IN | BODY MASS INDEX: 27 KG/M2

## 2024-10-30 DIAGNOSIS — S46.011D TRAUMATIC TEAR OF RIGHT ROTATOR CUFF, UNSPECIFIED TEAR EXTENT, SUBSEQUENT ENCOUNTER: Primary | ICD-10-CM

## 2024-10-30 PROCEDURE — 99213 OFFICE O/P EST LOW 20 MIN: CPT | Performed by: STUDENT IN AN ORGANIZED HEALTH CARE EDUCATION/TRAINING PROGRAM

## 2024-10-30 PROCEDURE — 20610 DRAIN/INJ JOINT/BURSA W/O US: CPT | Performed by: STUDENT IN AN ORGANIZED HEALTH CARE EDUCATION/TRAINING PROGRAM

## 2024-10-30 RX ORDER — TRIAMCINOLONE ACETONIDE 40 MG/ML
40 INJECTION, SUSPENSION INTRA-ARTICULAR; INTRAMUSCULAR
Status: COMPLETED | OUTPATIENT
Start: 2024-10-30 | End: 2024-10-30

## 2024-10-30 RX ORDER — BUPIVACAINE HYDROCHLORIDE 2.5 MG/ML
4 INJECTION, SOLUTION INFILTRATION; PERINEURAL
Status: COMPLETED | OUTPATIENT
Start: 2024-10-30 | End: 2024-10-30

## 2024-10-30 RX ADMIN — TRIAMCINOLONE ACETONIDE 40 MG: 40 INJECTION, SUSPENSION INTRA-ARTICULAR; INTRAMUSCULAR at 10:15

## 2024-10-30 RX ADMIN — BUPIVACAINE HYDROCHLORIDE 4 ML: 2.5 INJECTION, SOLUTION INFILTRATION; PERINEURAL at 10:15

## 2024-10-30 NOTE — PROGRESS NOTES
"Ortho Sports Medicine Shoulder Follow Up Visit     Assesment:   68 y.o. female 6 and a half months s/p Right shoulder arthroscopy with rotator cuff repair using dermal allograft augmentation and subacromial decompression performed on 4/15/2024    Plan:  The patient's diagnosis and treatment were discussed at length today. We discussed no treatment, non-operative treatment, and operative treatment.    Melita presents today 6-1/2 months status post right shoulder arthroscopy with rotator cuff repair using dermal allograft augmentation and subacromial decompression performed on 4/15/2024.  She does still demonstrate some stiffness and soreness with forward elevation, cross body adduction and reaching behind her back. At this time given her improvement and preserved strength we did agree to hold off on an MRI at this time and proceed forward with a subacromial injection.  This procedure was performed and she tolerated procedure well.  All question concerns were answered.  I discussed with her that if in approximately 4 to 6 weeks she continues to have pain and discomfort and no relief of her symptoms following this injection she can reach out via MyChart we can obtain an MRI for further evaluation.  I did review home exercises with her as she is beginning to transition out of outpatient physical therapy and this was provided in her after visit summary.  She can continue ice and over-the-counter medication as needed for pain relief.  She can follow-up in 2 months or sooner if an MRI is obtained.    Large joint arthrocentesis: R subacromial bursa  Universal Protocol:  Consent: Verbal consent obtained.  Risks and benefits: risks, benefits and alternatives were discussed  Consent given by: patient  Time out: Immediately prior to procedure a \"time out\" was called to verify the correct patient, procedure, equipment, support staff and site/side marked as required.  Timeout called at: 10/30/2024 10:58 AM.  Patient understanding: " patient states understanding of the procedure being performed  Patient consent: the patient's understanding of the procedure matches consent given  Site marked: the operative site was marked  Patient identity confirmed: verbally with patient  Supporting Documentation  Indications: pain and diagnostic evaluation   Procedure Details  Location: shoulder - R subacromial bursa  Preparation: Patient was prepped and draped in the usual sterile fashion  Needle size: 22 G  Ultrasound guidance: no  Approach: posterior  Medications administered: 4 mL bupivacaine 0.25 %; 40 mg triamcinolone acetonide 40 mg/mL    Patient tolerance: patient tolerated the procedure well with no immediate complications  Dressing:  Sterile dressing applied            Conservative treatment:    Ice to shoulder 1-2 times daily, for 20 minutes at a time.  Continue outpatient PT progressing motion and strength.      Imaging:    All imaging from today was reviewed by myself and explained to the patient.   If symptoms worsen or fail to improve following CSI in 4-6 weeks she may reach out via myChart for possible MRI.       Injection:    The risks and benefits of the injection (which include but are not limited to: infection, bleeding,damage to nerve/artery, need for further intervention), as well as the risks and benefits of all alternative treatments were explained and understood.  The patient elected to proceed with injection. The procedure was done with aseptic technique, and the patient tolerated the procedure well with no complications.  A corticosteroid injection of the subacromial space was performed.  The patient should take 1-2 days off of activity, with gradual return to activity as tolerated.  Ice to the shoulder 1-2 times daily for 20 minutes, for next 24-48 hrs.  Injection handout provided in AVS.      Surgery:     No surgery is recommended at this point, continue with conservative treatment plan as noted.      Follow up:    Return in about 2  months (around 12/30/2024) for Recheck.      Chief Complaint   Patient presents with    Right Shoulder - Follow-up, Pain, Extremity Weakness         History of Present Illness:    The patient is returns for follow up 6-1/2 months status post right shoulder arthroscopy with rotator cuff repair using dermal allograft augmentation and subacromial decompression performed on 4/15/2024.  She states that she continues to exhibit dull achy pain along the anterior and lateral aspect of her shoulder that does not radiate past her elbow.  She states that her pain is worse with repetitive use of her arm such as overhead lifting and reaching behind her back.  She has been compliant with outpatient physical therapy and does feel like her motion is improving, however she does feel like her pain has plateaued.  She states that she has not been as compliant with her home exercises as she is taking care of her mother currently in [place].  She denies any new injury or trauma to her shoulder.  She denies any numbness or tingling.    Past Medical, Social and Family History:  Past Medical History:   Diagnosis Date    Anxiety     Arthritis     Heart valve disease     Mitro valve prolapse    Hypertension     Irritable bowel disease     Psoriasis     Psoriatic arthritis (HCC)     Varicella      Past Surgical History:   Procedure Laterality Date    JOINT REPLACEMENT Left 2016    LEFT OOPHORECTOMY Left 2007    benign cyst    OOPHORECTOMY      CT SURGICAL ARTHROSCOPY SHERRON W/CORACOACRM LIGM RLS Right 4/15/2024    Procedure: ARTHROSCOPIC SUBACROMIAL DECOMPRESSION;  Surgeon: Joseph Larkin DO;  Location: [facility] OR;  Service: Orthopedics    CT SURGICAL ARTHROSCOPY SHOULDER W/ROTATOR CUFF RPR Right 4/15/2024    Procedure: SHOULDER ARTHROSCOPIC ROTATOR CUFF REPAIR;  Surgeon: Joseph Larkin DO;  Location: [facility] MAIN OR;  Service: Orthopedics    REPLACEMENT TOTAL KNEE Left      Allergies   Allergen Reactions    Sulfa Antibiotics Hives      Current Outpatient Medications on File Prior to Visit   Medication Sig Dispense Refill    Cholecalciferol (Vitamin D3) 50 MCG (2000 UT) capsule       Probiotic Product (PROBIOTIC BLEND PO) Take by mouth      psyllium (METAMUCIL) 58.6 % packet Take 1 packet by mouth 2 (two) times a day      Tiadylt  MG 24 hr capsule TAKE 1 CAPSULE BY MOUTH EVERY DAY 90 capsule 3    traZODone (DESYREL) 50 mg tablet Take 0.5 tablets (25 mg total) by mouth daily at bedtime as needed for sleep 45 tablet 1    Turmeric 400 MG CAPS Take by mouth 2 (two) times a day      clotrimazole-betamethasone (LOTRISONE) 1-0.05 % cream Apply topically 2 (two) times a day (Patient not taking: Reported on 10/11/2024) 45 g 1    dicyclomine (BENTYL) 10 mg capsule TAKE 1 CAPSULE BY MOUTH 3 TIMES A DAY BEFORE MEALS (Patient not taking: Reported on 4/8/2024) 270 capsule 1    fluticasone (FLONASE) 50 mcg/act nasal spray SPRAY 2 SPRAYS INTO EACH NOSTRIL EVERY DAY (Patient not taking: Reported on 10/30/2024) 48 mL 1    guselkumab (TREMFYA) subcutaneous injection Inject 100 mg under the skin every 2 (two) months Medication on hold until insurance clearance (Patient not taking: Reported on 9/11/2024)      traMADol (Ultram) 50 mg tablet Take 1 tablet (50 mg total) by mouth every 6 (six) hours as needed for moderate pain (Patient not taking: Reported on 10/11/2024) 25 tablet 0     No current facility-administered medications on file prior to visit.     Social History     Socioeconomic History    Marital status:      Spouse name: Not on file    Number of children: Not on file    Years of education: Not on file    Highest education level: Not on file   Occupational History    Occupation: retired   Tobacco Use    Smoking status: Never    Smokeless tobacco: Never   Vaping Use    Vaping status: Never Used   Substance and Sexual Activity    Alcohol use: Not Currently     Alcohol/week: 1.0 standard drink of alcohol     Types: 1 Glasses of wine per week      "Comment: With dinner    Drug use: Not Currently     Types: Marijuana     Comment: Many years ago    Sexual activity: Not Currently     Partners: Male     Birth control/protection: Post-menopausal     Comment: Not active in a long time.  No partners, no interest   Other Topics Concern    Not on file   Social History Narrative    Not on file     Social Determinants of Health     Financial Resource Strain: Low Risk  (1/19/2024)    Overall Financial Resource Strain (CARDIA)     Difficulty of Paying Living Expenses: Not hard at all   Food Insecurity: Not on file   Transportation Needs: No Transportation Needs (1/19/2024)    PRAPARE - Transportation     Lack of Transportation (Medical): No     Lack of Transportation (Non-Medical): No   Physical Activity: Not on file   Stress: Not on file   Social Connections: Not on file   Intimate Partner Violence: Not on file   Housing Stability: Not on file       I have reviewed the past medical, surgical, social and family history, medications and allergies as documented in the EMR.    Review of systems: ROS is negative other than that noted in the HPI.  Constitutional: Negative for fatigue and fever.      Physical Exam:    Height 5' 6\" (1.676 m), weight 76.2 kg (168 lb), not currently breastfeeding.    General/Constitutional: NAD, well developed, well nourished  HENT: Normocephalic, atraumatic  CV: Intact distal pulses, regular rate  Resp: No respiratory distress or labored breathing  Lymphatic: No lymphadenopathy palpated  Neuro: Alert and Oriented x 3, no focal deficits  Psych: Normal mood, normal affect, normal judgement, normal behavior  Skin: Warm, dry, no rashes, no erythema      Shoulder focused exam:        Visual inspection of the shoulder demonstrates normal contour without atrophy  No evidence of scapular dyskinesia or atrophy.  No scapular winging  Active and passive range of motion demonstrates forward flexion to 180, able to reach back of head, abduction to 100, " external rotation is 45 with the arm the side, internal rotation to T10   Rotator cuff strength is 4+/5 to resisted forward flexion, 4+/5 resisted abduction, 4+/5 resisted external rotation, 4+/5 resisted internal rotation  No tenderness to palpation at the distal clavicle, AC joint, acromion, or scapular spine  No pain with cross-body adduction  - Neer's test, - modified Kirby impingement test  Negative external rotation lag sign  Negative belly press, negative lift-off  - speed's and Yergason's test  - tenderness to palpation at the bicipital groove  - Alma's test    UE NV Exam: +2 Radial pulses bilaterally  Sensation intact to light touch C5-T1 bilaterally, Radial/median/ulnar nerve motor intact    Cervical ROM is full without pain, numbness or tingling      Shoulder Imaging    No imaging was performed today      Scribe Attestation      I,:  Rigo Olivia am acting as a scribe while in the presence of the attending physician.:       I,:  Joseph Larkin DO personally performed the services described in this documentation    as scribed in my presence.:

## 2024-10-31 LAB
GAMMA INTERFERON BACKGROUND BLD IA-ACNC: <0 IU/ML
M TB IFN-G BLD-IMP: NEGATIVE
M TB IFN-G CD4+ BCKGRND COR BLD-ACNC: 0 IU/ML
M TB IFN-G CD4+ BCKGRND COR BLD-ACNC: 0 IU/ML
MITOGEN IGNF BCKGRD COR BLD-ACNC: 2.72 IU/ML

## 2024-11-07 DIAGNOSIS — F51.04 PSYCHOPHYSIOLOGICAL INSOMNIA: ICD-10-CM

## 2024-11-08 ENCOUNTER — TELEPHONE (OUTPATIENT)
Age: 69
End: 2024-11-08

## 2024-11-08 RX ORDER — TRAZODONE HYDROCHLORIDE 50 MG/1
25 TABLET, FILM COATED ORAL
Qty: 45 TABLET | Refills: 1 | Status: SHIPPED | OUTPATIENT
Start: 2024-11-08

## 2024-11-08 NOTE — TELEPHONE ENCOUNTER
Pt called stating that she got an email about a r/s with Dr. Woodall/ she did confirm her appt change.    Upset that no one called her/ and explained why it was cancelled.    I advised her of her new appt time w / Dr. Woodall.

## 2024-11-18 ENCOUNTER — OFFICE VISIT (OUTPATIENT)
Dept: FAMILY MEDICINE CLINIC | Facility: CLINIC | Age: 69
End: 2024-11-18
Payer: MEDICARE

## 2024-11-18 VITALS
BODY MASS INDEX: 26.36 KG/M2 | WEIGHT: 164 LBS | HEART RATE: 94 BPM | RESPIRATION RATE: 16 BRPM | OXYGEN SATURATION: 97 % | HEIGHT: 66 IN | SYSTOLIC BLOOD PRESSURE: 136 MMHG | DIASTOLIC BLOOD PRESSURE: 84 MMHG | TEMPERATURE: 97.8 F

## 2024-11-18 DIAGNOSIS — Z12.31 SCREENING MAMMOGRAM FOR BREAST CANCER: ICD-10-CM

## 2024-11-18 DIAGNOSIS — L98.9 SKIN LESION: Primary | ICD-10-CM

## 2024-11-18 PROCEDURE — 99213 OFFICE O/P EST LOW 20 MIN: CPT | Performed by: FAMILY MEDICINE

## 2024-11-18 PROCEDURE — G2211 COMPLEX E/M VISIT ADD ON: HCPCS | Performed by: FAMILY MEDICINE

## 2024-11-18 NOTE — PROGRESS NOTES
"Name: Rosalind Guerrero      : 1955      MRN: [id number]  Encounter Provider: Valdemar Cheng DO  Encounter Date: 2024   Encounter department: [facility]  :  Assessment & Plan  Screening mammogram for breast cancer    Orders:    Mammo screening bilateral w 3d and cad    Skin lesion  As pictured below. Recommend she see dermatology if any change or new concerns. Can try emollient and steroid cream on the leg and follow up if not improvement. No history of skin cancers.              History of Present Illness     HPI  Follows with Dr. Thomas for psoriasis, saw him last week. Noticed two small blackish spots under her right eye where her glasses sit that she wanted to check on and one crusted spot on her calf.    Review of Systems       Objective   /84 (BP Location: Left arm, Patient Position: Sitting, Cuff Size: Large)   Pulse 94   Temp 97.8 °F (36.6 °C) (Temporal)   Resp 16   Ht 5' 6\" (1.676 m)   Wt 74.4 kg (164 lb)   SpO2 97%   BMI 26.47 kg/m²      Physical Exam            "

## 2024-11-21 ENCOUNTER — OFFICE VISIT (OUTPATIENT)
Dept: OBGYN CLINIC | Facility: CLINIC | Age: 69
End: 2024-11-21
Payer: MEDICARE

## 2024-11-21 VITALS
BODY MASS INDEX: 26.48 KG/M2 | SYSTOLIC BLOOD PRESSURE: 140 MMHG | WEIGHT: 164.8 LBS | HEIGHT: 66 IN | DIASTOLIC BLOOD PRESSURE: 78 MMHG

## 2024-11-21 DIAGNOSIS — N89.8 VAGINAL DISCHARGE: Primary | ICD-10-CM

## 2024-11-21 PROCEDURE — 99212 OFFICE O/P EST SF 10 MIN: CPT | Performed by: OBSTETRICS & GYNECOLOGY

## 2024-11-21 NOTE — PROGRESS NOTES
Assessment/Plan:      Diagnoses and all orders for this visit:    Vaginal discharge  - reassured pt there does not appear to be any infection  - reviewed perineal care  - declines need for vaginal estrogen    - return for annual q2y        Subjective:     Patient ID: Rosalind Guerrero is a 68 y.o. female.    HPI  68-year-old 2 presents with concern of vaginal discharge.  She recently started to notice a small amount of yellow discharge.  She denies any itching or burning.  No odor.  No bleeding.  She brought pantiliners that have a very small amount of what appears to be physiological discharge.    Review of Systems   Constitutional: Negative.    HENT: Negative.     Respiratory: Negative.     Cardiovascular: Negative.    Gastrointestinal: Negative.    Genitourinary:  Positive for vaginal discharge. Negative for pelvic pain, vaginal bleeding and vaginal pain.   Neurological: Negative.    Psychiatric/Behavioral: Negative.           Objective:     Physical Exam  Vitals and nursing note reviewed.   Constitutional:       Appearance: Normal appearance. She is normal weight.   HENT:      Head: Normocephalic and atraumatic.   Eyes:      Extraocular Movements: Extraocular movements intact.      Conjunctiva/sclera: Conjunctivae normal.      Pupils: Pupils are equal, round, and reactive to light.   Pulmonary:      Effort: Pulmonary effort is normal.   Genitourinary:     Labia:         Right: No rash, tenderness, lesion or injury.         Left: No rash, tenderness, lesion or injury.       Vagina: Normal.      Cervix: Normal.      Uterus: Normal.       Adnexa: Right adnexa normal and left adnexa normal.      Comments: Mild atrophy  Physiological discharge  Skin:     General: Skin is warm and dry.   Neurological:      General: No focal deficit present.      Mental Status: She is alert and oriented to person, place, and time.   Psychiatric:         Mood and Affect: Mood normal.         Behavior: Behavior normal.

## 2024-11-24 ENCOUNTER — OFFICE VISIT (OUTPATIENT)
Dept: URGENT CARE | Facility: CLINIC | Age: 69
End: 2024-11-24
Payer: MEDICARE

## 2024-11-24 VITALS
BODY MASS INDEX: 26.52 KG/M2 | WEIGHT: 165 LBS | TEMPERATURE: 98 F | SYSTOLIC BLOOD PRESSURE: 134 MMHG | HEIGHT: 66 IN | DIASTOLIC BLOOD PRESSURE: 86 MMHG | RESPIRATION RATE: 18 BRPM | OXYGEN SATURATION: 97 % | HEART RATE: 96 BPM

## 2024-11-24 DIAGNOSIS — H00.015 HORDEOLUM EXTERNUM OF LEFT LOWER EYELID: Primary | ICD-10-CM

## 2024-11-24 PROCEDURE — 99213 OFFICE O/P EST LOW 20 MIN: CPT | Performed by: PHYSICIAN ASSISTANT

## 2024-11-24 PROCEDURE — G0463 HOSPITAL OUTPT CLINIC VISIT: HCPCS | Performed by: PHYSICIAN ASSISTANT

## 2024-11-24 RX ORDER — ERYTHROMYCIN 5 MG/G
0.5 OINTMENT OPHTHALMIC
Qty: 3.5 G | Refills: 0 | Status: SHIPPED | OUTPATIENT
Start: 2024-11-24 | End: 2024-12-01

## 2024-11-24 NOTE — PROGRESS NOTES
[facility]        NAME: Rosalind Guerrero is a 68 y.o. female  : 1955    MRN: [id number]  DATE: 2024  TIME: 12:06 PM    Assessment and Plan   Hordeolum externum of left lower eyelid [H00.015]  1. Hordeolum externum of left lower eyelid  erythromycin (ILOTYCIN) ophthalmic ointment            Patient Instructions       Follow up with PCP in 3-5 days.  Proceed to  ER if symptoms worsen.    If tests have been performed at [facility], our office will contact you with results if changes need to be made to the care plan discussed with you at the visit.  [facility]    Chief Complaint     Chief Complaint   Patient presents with    Eye Pain     Patient started with left eye pain 7 days ago as well as some redness and swelling under the eye          History of Present Illness       Patient presents with left lower eyelid pain started about 7 days ago.  Noticed some swelling on the inside of the eyelid few days ago and then had some redness and swelling of the lower lid today.  Denies visual disturbances contact lens use, painful eye movements, or injury.        Review of Systems   Review of Systems   Constitutional:  Negative for fever.   Eyes:  Positive for pain and redness. Negative for photophobia, discharge, itching and visual disturbance.         Current Medications       Current Outpatient Medications:     erythromycin (ILOTYCIN) ophthalmic ointment, Administer 0.5 inches into the left eye every 4 (four) hours for 7 days, Disp: 3.5 g, Rfl: 0    Cholecalciferol (Vitamin D3) 50 MCG (2000 UT) capsule, , Disp: , Rfl:     clotrimazole-betamethasone (LOTRISONE) 1-0.05 % cream, Apply topically 2 (two) times a day, Disp: 45 g, Rfl: 1    dicyclomine (BENTYL) 10 mg capsule, TAKE 1 CAPSULE BY MOUTH 3 TIMES A DAY BEFORE MEALS, Disp: 270 capsule, Rfl: 1    fluticasone (FLONASE) 50 mcg/act nasal spray, SPRAY 2 SPRAYS INTO EACH NOSTRIL EVERY DAY, Disp: 48 mL,  Rfl: 1    guselkumab (TREMFYA) subcutaneous injection, Inject 100 mg under the skin every 2 (two) months Medication on hold until insurance clearance (Patient not taking: Reported on 9/11/2024), Disp: , Rfl:     OMEGA-3 KRILL OIL PO, Take by mouth, Disp: , Rfl:     Probiotic Product (PROBIOTIC BLEND PO), Take by mouth, Disp: , Rfl:     psyllium (METAMUCIL) 58.6 % packet, Take 1 packet by mouth 2 (two) times a day, Disp: , Rfl:     Tiadylt  MG 24 hr capsule, TAKE 1 CAPSULE BY MOUTH EVERY DAY, Disp: 90 capsule, Rfl: 3    traMADol (Ultram) 50 mg tablet, Take 1 tablet (50 mg total) by mouth every 6 (six) hours as needed for moderate pain (Patient not taking: Reported on 10/11/2024), Disp: 25 tablet, Rfl: 0    traZODone (DESYREL) 50 mg tablet, TAKE 0.5 TABLETS (25 MG TOTAL) BY MOUTH DAILY AT BEDTIME AS NEEDED FOR SLEEP, Disp: 45 tablet, Rfl: 1    Turmeric 400 MG CAPS, Take by mouth 2 (two) times a day, Disp: , Rfl:     Current Allergies     Allergies as of 11/24/2024 - Reviewed 11/24/2024   Allergen Reaction Noted    Sulfa antibiotics Hives 03/10/2022            The following portions of the patient's history were reviewed and updated as appropriate: allergies, current medications, past family history, past medical history, past social history, past surgical history and problem list.     Past Medical History:   Diagnosis Date    Anxiety     Arthritis     Heart valve disease     Mitro valve prolapse    Hypertension     Irritable bowel disease     Psoriasis     Psoriatic arthritis (HCC)     Varicella        Past Surgical History:   Procedure Laterality Date    JOINT REPLACEMENT Left 2016    LEFT OOPHORECTOMY Left 2007    benign cyst    OOPHORECTOMY      KS SURGICAL ARTHROSCOPY SHERRON W/CORACOACRM LIGM RLS Right 4/15/2024    Procedure: ARTHROSCOPIC SUBACROMIAL DECOMPRESSION;  Surgeon: Joseph Larkin DO;  Location: [facility] MAIN OR;  Service: Orthopedics    KS SURGICAL ARTHROSCOPY SHOULDER W/ROTATOR CUFF RPR Right  "4/15/2024    Procedure: SHOULDER ARTHROSCOPIC ROTATOR CUFF REPAIR;  Surgeon: Joseph Larkin DO;  [facility];  Service: Orthopedics    REPLACEMENT TOTAL KNEE Left        Family History   Problem Relation Age of Onset    Stroke Mother     Supraventricular tachycardia Mother     Psoriasis Mother     Psoriatic Arthritis Mother     Hypertension Mother         Mother and father    Dementia Mother         Mother    Squamous cell carcinoma Mother         skin- unknown age    Hypertension Father     Heart disease Father     Heart attack Father     Kidney cancer Sister         20's    Cancer Sister         Kidney removed due to cancer    No Known Problems Sister     No Known Problems Daughter     Colon cancer Maternal Grandmother 71    Cancer Maternal Grandmother         Colon cancer    No Known Problems Maternal Grandfather     No Known Problems Paternal Grandmother     Prostate cancer Paternal Grandfather         90's    No Known Problems Maternal Aunt     No Known Problems Maternal Aunt          Medications have been verified.        Objective   /86   Pulse 96   Temp 98 °F (36.7 °C)   Resp 18   Ht 5' 6\" (1.676 m)   Wt 74.8 kg (165 lb)   SpO2 97%   BMI 26.63 kg/m²   No LMP recorded. Patient is postmenopausal.       Physical Exam     Physical Exam  Constitutional:       Appearance: Normal appearance.   Eyes:      General:         Right eye: No discharge.         Left eye: No discharge.      Extraocular Movements: Extraocular movements intact.      Conjunctiva/sclera: Conjunctivae normal.      Pupils: Pupils are equal, round, and reactive to light.     Neurological:      Mental Status: She is alert.   Psychiatric:         Mood and Affect: Mood normal.         Behavior: Behavior normal.                   "

## 2024-11-24 NOTE — PATIENT INSTRUCTIONS
Call your eye doctor and schedule follow-up appointment within the next 3 to 5 days.  If any new or worsening symptoms develop get reevaluated sooner.  Use ointment as prescribed.

## 2024-12-23 ENCOUNTER — NURSE TRIAGE (OUTPATIENT)
Age: 69
End: 2024-12-23

## 2024-12-23 ENCOUNTER — RA CDI HCC (OUTPATIENT)
Dept: OTHER | Facility: HOSPITAL | Age: 69
End: 2024-12-23

## 2024-12-23 ENCOUNTER — TELEPHONE (OUTPATIENT)
Age: 69
End: 2024-12-23

## 2024-12-23 DIAGNOSIS — R10.2 PELVIC PAIN: Primary | ICD-10-CM

## 2024-12-23 NOTE — TELEPHONE ENCOUNTER
Patient called requesting specific names of doctors who Dr Sam typically recommends to her patients for gastroenterology.Offered the gastro pod phone number but she wants names or groups of who she recommends.  Please advise back to further assist.

## 2024-12-23 NOTE — TELEPHONE ENCOUNTER
"Pt calling in c/o having lower abdominal pain/ discomfort. Pt is unsure if its UTI related or pelvic pain related. Pt is still c/o discharge yellow/ tan in color. Pt is having cramping pain 5/10 \"discomfort feeling\". Pt denies  fever, itching, vaginal bleeding, pain with urination, injury to genital area, urinary issues.       Pt would like to know if there is any imaging that would need to be completed as pt is concerned. Pt was notified  that a message will be sent to Dr Chapman for further recommendations, Pt verbalized understanding, no further questions at this time.           Answer Assessment - Initial Assessment Questions  1. DISCHARGE: \"Describe the discharge.\" (e.g., white, yellow, green, gray, foamy, cottage cheese-like)      Yellow/ tan in color   2. ODOR: \"Is there a bad odor?\"      Pt denies   3. ONSET: \"When did the discharge begin?\"      A month go   4. RASH: \"Is there a rash in the genital area?\" If Yes, ask: \"Describe it.\" (e.g., redness, blisters, sores, bumps)      Pt denies   5. ABDOMEN PAIN: \"Are you having any abdomen pain?\" If Yes, ask: \"What does it feel like? \" (e.g., crampy, dull, intermittent, constant)       Cramping pain lower pelvic area,     6. ABDOMEN PAIN SEVERITY: If present, ask: \"How bad is it?\" (e.g., Scale 1-10; mild, moderate, or severe)      5/10   7. CAUSE: \"What do you think is causing the discharge?\" \"Have you had the same problem before?\" \"What happened then?\"      Pt unsure   8. OTHER SYMPTOMS: \"Do you have any other symptoms?\" (e.g., fever, itching, vaginal bleeding, pain with urination, injury to genital area      Pt denies  fever, itching, vaginal bleeding, pain with urination, injury to genital area  9. PREGNANCY: \"Is there any chance you are pregnant?\" \"When was your last menstrual period?\"      Postmenopausal.    Protocols used: Vaginal Discharge-Adult-[place]    "

## 2024-12-23 NOTE — TELEPHONE ENCOUNTER
Patient called and wanted to add a few more things.She states she controlled IBS.  Her IBS usually does not last this long.The  discharge makes her think it is not her IBS.  Patient made an appointment  with PCP for 12/30/24.  She would like to see what you think regarding her discharge.

## 2024-12-26 ENCOUNTER — HOSPITAL ENCOUNTER (OUTPATIENT)
Dept: ULTRASOUND IMAGING | Facility: HOSPITAL | Age: 69
Discharge: HOME | End: 2024-12-26
Attending: OBSTETRICS & GYNECOLOGY
Payer: MEDICARE

## 2024-12-26 DIAGNOSIS — R10.2 PELVIC PAIN: ICD-10-CM

## 2024-12-26 PROCEDURE — 76856 US EXAM PELVIC COMPLETE: CPT

## 2024-12-26 PROCEDURE — 76830 TRANSVAGINAL US NON-OB: CPT

## 2024-12-31 ENCOUNTER — CONSULT (OUTPATIENT)
Age: 69
End: 2024-12-31
Payer: MEDICARE

## 2024-12-31 VITALS
TEMPERATURE: 98.3 F | HEIGHT: 66 IN | BODY MASS INDEX: 26.48 KG/M2 | HEART RATE: 80 BPM | WEIGHT: 164.8 LBS | OXYGEN SATURATION: 98 % | DIASTOLIC BLOOD PRESSURE: 72 MMHG | SYSTOLIC BLOOD PRESSURE: 118 MMHG

## 2024-12-31 DIAGNOSIS — L40.50 PSORIATIC ARTHRITIS (HCC): Primary | Chronic | ICD-10-CM

## 2024-12-31 DIAGNOSIS — E55.9 VITAMIN D DEFICIENCY: ICD-10-CM

## 2024-12-31 DIAGNOSIS — M85.89 OSTEOPENIA OF MULTIPLE SITES: ICD-10-CM

## 2024-12-31 PROCEDURE — 99204 OFFICE O/P NEW MOD 45 MIN: CPT | Performed by: PHYSICIAN ASSISTANT

## 2024-12-31 NOTE — PROGRESS NOTES
Name: Rosalind Guerrero      : 1955      MRN: [id number]  Encounter Provider: Danika Beck PA-C  Encounter Date: 2024   Encounter department: [facility] RHEUMATOLOGY [facility]  :  Assessment & Plan  Psoriatic arthritis (HCC)  History of psoriatic arthritis previously treated with methotrexate and most recently Tremfya.  Tremfya was discontinued in April of this year prior to her shoulder surgery.  She has not had any symptoms of active inflammatory/psoriatic arthritis however she is having more active psoriasis.  At this time I have recommended that she restart her Tremfya to treat her psoriasis.  There are no signs of active inflammation or synovitis on exam today.  We will continue to monitor symptoms.  I have recommended some baseline lab work with serologies and inflammatory markers.  Plan to follow-up in 3 to 4 months or sooner if needed.    Orders:    CBC and differential; Future    C-reactive protein; Future    Comprehensive metabolic panel; Future    RF Screen w/ Reflex to Titer; Future    Cyclic citrul peptide antibody, IgG; Future    Lyme Ab/Western Blot Reflex; Future    TSH w/Reflex; Future    Sedimentation rate, automated; Future    Ambulatory referral to Rheumatology    Osteopenia of multiple sites  History of osteopenia with low FRAX risk.  She has no history of adult fracture and no family history of osteoporosis however she was noted to have soft bone by her surgeon during her shoulder surgery done in April.  She is due for an updated DEXA scan next month.  Will plan to reassess after DEXA scan.  I have also given her a prescription for metabolic workup to rule out any secondary causes of osteoporosis.    Orders:    Vitamin D 25 hydroxy; Future    Ambulatory referral to Rheumatology    Alkaline phosphatase, bone specific; Future    PTH, intact; Future    Protein Electrophoresis with Interpretation, Serum with Reflex to ANDREINA, Serum; Future    IMMUNOFIXATION  (ANDREINA) AND PROTEIN ELECTROPHORESIS, RANDOM URINE    Celiac Antibodies Profile; Future    C-Telopeptide; Future    Vitamin D deficiency    Orders:    Vitamin D 25 hydroxy; Future        History of Present Illness   Rosalind Guerrero is a 69 y.o. female.  She is here for new patient evaluation for history of psoriatic arthritis.  She was initially diagnosed with psoriatic arthritis a few years ago and had been following with rheumatologist when she was living in [place].  She was initially started on methotrexate.  At the time of her diagnosis she was having more joint pain with swelling.  She ultimately moved to the [facility] and has been following with dermatology (Dr. Thomas) and was ultimately started on Tremfya.  She has done very well with Tremfya and her psoriasis completely resolved.  She had shoulder surgery in April of this year and discontinued her Tremfya at the time.  She has not restarted it and up until more recently was not having any symptoms of active psoriasis or psoriatic arthritis.  More recently she has been having increased psoriasis however she is not having any joint pain.  She has no joint swelling.  She has no signs of dactylitis or enthesitis.  She does have some osteoarthritis symptoms, particularly in her first CMC joints.    She has a history of osteopenia.  Her most recent DEXA scan was done on 1/18/2023.  Lumbar spine T-score (L1, L3, L4) -1.6.  Left total hip T-score -1.5, femoral neck -0.8.  FRAX hip fracture risk 0.4%, major fracture risk 7.1%.  Of note, when she had her shoulder surgery done in April her surgeon did note that the bone was soft.    Medical history includes history of hypertension and IBS with constipation.    No history of recurrent fevers or chills.  She has no chest pain or shortness of breath.  As noted above she does have a history of IBS with constipation.  She has no numbness or tingling in her hands or feet.  She does note some difficulty  sleeping.            Review of Systems   Constitutional:  Negative for chills, fatigue and fever.   HENT:  Negative for hearing loss, sore throat and tinnitus.    Eyes:  Negative for pain and visual disturbance.        Dry eyes   Respiratory:  Negative for cough and shortness of breath.    Cardiovascular:  Negative for chest pain and palpitations.   Gastrointestinal:  Positive for constipation. Negative for abdominal pain, nausea and vomiting.   Genitourinary:  Negative for difficulty urinating.   Musculoskeletal:  Negative for arthralgias, back pain, gait problem, joint swelling, myalgias, neck pain and neck stiffness.   Skin:  Positive for rash (psoriasis).   Neurological:  Negative for dizziness, seizures, weakness, numbness and headaches.   Psychiatric/Behavioral:  Positive for sleep disturbance. Negative for confusion and decreased concentration.      Current Outpatient Medications on File Prior to Visit   Medication Sig Dispense Refill    Cholecalciferol (Vitamin D3) 50 MCG (2000 UT) capsule       clotrimazole-betamethasone (LOTRISONE) 1-0.05 % cream Apply topically 2 (two) times a day 45 g 1    dicyclomine (BENTYL) 10 mg capsule TAKE 1 CAPSULE BY MOUTH 3 TIMES A DAY BEFORE MEALS (Patient taking differently: Take by mouth as needed) 270 capsule 1    fluticasone (FLONASE) 50 mcg/act nasal spray SPRAY 2 SPRAYS INTO EACH NOSTRIL EVERY DAY 48 mL 1    Probiotic Product (PROBIOTIC BLEND PO) Take by mouth      psyllium (METAMUCIL) 58.6 % packet Take 1 packet by mouth 2 (two) times a day      Tiadylt  MG 24 hr capsule TAKE 1 CAPSULE BY MOUTH EVERY DAY 90 capsule 3    traZODone (DESYREL) 50 mg tablet TAKE 0.5 TABLETS (25 MG TOTAL) BY MOUTH DAILY AT BEDTIME AS NEEDED FOR SLEEP 45 tablet 1    Turmeric 400 MG CAPS Take by mouth 2 (two) times a day      guselkumab (TREMFYA) subcutaneous injection Inject 100 mg under the skin every 2 (two) months Medication on hold until insurance clearance (Patient not taking:  "Reported on 9/11/2024)      OMEGA-3 KRILL OIL PO Take by mouth (Patient not taking: Reported on 12/31/2024)      traMADol (Ultram) 50 mg tablet Take 1 tablet (50 mg total) by mouth every 6 (six) hours as needed for moderate pain (Patient not taking: Reported on 10/11/2024) 25 tablet 0     No current facility-administered medications on file prior to visit.         Objective   /72 (BP Location: Right arm)   Pulse 80   Temp 98.3 °F (36.8 °C)   Ht 5' 6\" (1.676 m)   Wt 74.8 kg (164 lb 12.8 oz)   SpO2 98%   BMI 26.60 kg/m²      Physical Exam  Constitutional:       Appearance: Normal appearance.   Cardiovascular:      Rate and Rhythm: Normal rate and regular rhythm.   Pulmonary:      Breath sounds: Normal breath sounds.   Musculoskeletal:         General: No swelling or tenderness.   Skin:     General: Skin is warm and dry.   Neurological:      General: No focal deficit present.      Mental Status: She is alert.             Dragon Dictation software was used to dictate this note. It may contain errors with dictating incorrect words/spelling. Please contact provider directly for any questions.    "

## 2024-12-31 NOTE — ASSESSMENT & PLAN NOTE
History of psoriatic arthritis previously treated with methotrexate and most recently Tremfya.  Tremfya was discontinued in April of this year prior to her shoulder surgery.  She has not had any symptoms of active inflammatory/psoriatic arthritis however she is having more active psoriasis.  At this time I have recommended that she restart her Tremfya to treat her psoriasis.  There are no signs of active inflammation or synovitis on exam today.  We will continue to monitor symptoms.  I have recommended some baseline lab work with serologies and inflammatory markers.  Plan to follow-up in 3 to 4 months or sooner if needed.    Orders:    CBC and differential; Future    C-reactive protein; Future    Comprehensive metabolic panel; Future    RF Screen w/ Reflex to Titer; Future    Cyclic citrul peptide antibody, IgG; Future    Lyme Ab/Western Blot Reflex; Future    TSH w/Reflex; Future    Sedimentation rate, automated; Future    Ambulatory referral to Rheumatology

## 2024-12-31 NOTE — ASSESSMENT & PLAN NOTE
History of osteopenia with low FRAX risk.  She has no history of adult fracture and no family history of osteoporosis however she was noted to have soft bone by her surgeon during her shoulder surgery done in April.  She is due for an updated DEXA scan next month.  Will plan to reassess after DEXA scan.  I have also given her a prescription for metabolic workup to rule out any secondary causes of osteoporosis.    Orders:    Vitamin D 25 hydroxy; Future    Ambulatory referral to Rheumatology    Alkaline phosphatase, bone specific; Future    PTH, intact; Future    Protein Electrophoresis with Interpretation, Serum with Reflex to ANDREINA, Serum; Future    IMMUNOFIXATION (ANDREINA) AND PROTEIN ELECTROPHORESIS, RANDOM URINE    Celiac Antibodies Profile; Future    C-Telopeptide; Future

## 2025-01-04 ENCOUNTER — APPOINTMENT (OUTPATIENT)
Dept: LAB | Facility: CLINIC | Age: 70
End: 2025-01-04
Payer: MEDICARE

## 2025-01-04 DIAGNOSIS — L40.50 PSORIATIC ARTHRITIS (HCC): ICD-10-CM

## 2025-01-04 DIAGNOSIS — E55.9 VITAMIN D DEFICIENCY: ICD-10-CM

## 2025-01-04 DIAGNOSIS — M85.89 OSTEOPENIA OF MULTIPLE SITES: ICD-10-CM

## 2025-01-04 LAB
25(OH)D3 SERPL-MCNC: 32.7 NG/ML (ref 30–100)
ALBUMIN SERPL BCG-MCNC: 4.4 G/DL (ref 3.5–5)
ALP SERPL-CCNC: 76 U/L (ref 34–104)
ALT SERPL W P-5'-P-CCNC: 12 U/L (ref 7–52)
ANION GAP SERPL CALCULATED.3IONS-SCNC: 7 MMOL/L (ref 4–13)
AST SERPL W P-5'-P-CCNC: 12 U/L (ref 13–39)
BASOPHILS # BLD AUTO: 0.05 THOUSANDS/ΜL (ref 0–0.1)
BASOPHILS NFR BLD AUTO: 1 % (ref 0–1)
BILIRUB SERPL-MCNC: 0.53 MG/DL (ref 0.2–1)
BUN SERPL-MCNC: 11 MG/DL (ref 5–25)
CALCIUM SERPL-MCNC: 9.7 MG/DL (ref 8.4–10.2)
CHLORIDE SERPL-SCNC: 104 MMOL/L (ref 96–108)
CO2 SERPL-SCNC: 29 MMOL/L (ref 21–32)
CREAT SERPL-MCNC: 0.78 MG/DL (ref 0.6–1.3)
CRP SERPL QL: 1.1 MG/L
EOSINOPHIL # BLD AUTO: 0.07 THOUSAND/ΜL (ref 0–0.61)
EOSINOPHIL NFR BLD AUTO: 1 % (ref 0–6)
ERYTHROCYTE [DISTWIDTH] IN BLOOD BY AUTOMATED COUNT: 13.1 % (ref 11.6–15.1)
ERYTHROCYTE [SEDIMENTATION RATE] IN BLOOD: 22 MM/HOUR (ref 0–29)
GFR SERPL CREATININE-BSD FRML MDRD: 78 ML/MIN/1.73SQ M
GLUCOSE P FAST SERPL-MCNC: 94 MG/DL (ref 65–99)
HCT VFR BLD AUTO: 47.7 % (ref 34.8–46.1)
HGB BLD-MCNC: 15.5 G/DL (ref 11.5–15.4)
IGA SERPL-MCNC: 112 MG/DL (ref 66–433)
IMM GRANULOCYTES # BLD AUTO: 0 THOUSAND/UL (ref 0–0.2)
IMM GRANULOCYTES NFR BLD AUTO: 0 % (ref 0–2)
LYMPHOCYTES # BLD AUTO: 1.79 THOUSANDS/ΜL (ref 0.6–4.47)
LYMPHOCYTES NFR BLD AUTO: 35 % (ref 14–44)
MCH RBC QN AUTO: 30.7 PG (ref 26.8–34.3)
MCHC RBC AUTO-ENTMCNC: 32.5 G/DL (ref 31.4–37.4)
MCV RBC AUTO: 95 FL (ref 82–98)
MONOCYTES # BLD AUTO: 0.41 THOUSAND/ΜL (ref 0.17–1.22)
MONOCYTES NFR BLD AUTO: 8 % (ref 4–12)
NEUTROPHILS # BLD AUTO: 2.73 THOUSANDS/ΜL (ref 1.85–7.62)
NEUTS SEG NFR BLD AUTO: 55 % (ref 43–75)
NRBC BLD AUTO-RTO: 0 /100 WBCS
PLATELET # BLD AUTO: 344 THOUSANDS/UL (ref 149–390)
PMV BLD AUTO: 9 FL (ref 8.9–12.7)
POTASSIUM SERPL-SCNC: 4.2 MMOL/L (ref 3.5–5.3)
PROT SERPL-MCNC: 7.2 G/DL (ref 6.4–8.4)
PTH-INTACT SERPL-MCNC: 49.2 PG/ML (ref 12–88)
RBC # BLD AUTO: 5.05 MILLION/UL (ref 3.81–5.12)
SODIUM SERPL-SCNC: 140 MMOL/L (ref 135–147)
TSH SERPL DL<=0.05 MIU/L-ACNC: 2.38 UIU/ML (ref 0.45–4.5)
WBC # BLD AUTO: 5.05 THOUSAND/UL (ref 4.31–10.16)

## 2025-01-04 PROCEDURE — 82784 ASSAY IGA/IGD/IGG/IGM EACH: CPT

## 2025-01-04 PROCEDURE — 86364 TISS TRNSGLTMNASE EA IG CLAS: CPT

## 2025-01-04 PROCEDURE — 86431 RHEUMATOID FACTOR QUANT: CPT

## 2025-01-04 PROCEDURE — 85025 COMPLETE CBC W/AUTO DIFF WBC: CPT

## 2025-01-04 PROCEDURE — 80053 COMPREHEN METABOLIC PANEL: CPT

## 2025-01-04 PROCEDURE — 85652 RBC SED RATE AUTOMATED: CPT

## 2025-01-04 PROCEDURE — 36415 COLL VENOUS BLD VENIPUNCTURE: CPT

## 2025-01-04 PROCEDURE — 82523 COLLAGEN CROSSLINKS: CPT

## 2025-01-04 PROCEDURE — 86430 RHEUMATOID FACTOR TEST QUAL: CPT

## 2025-01-04 PROCEDURE — 84165 PROTEIN E-PHORESIS SERUM: CPT

## 2025-01-04 PROCEDURE — 84080 ASSAY ALKALINE PHOSPHATASES: CPT

## 2025-01-04 PROCEDURE — 82306 VITAMIN D 25 HYDROXY: CPT

## 2025-01-04 PROCEDURE — 86200 CCP ANTIBODY: CPT

## 2025-01-04 PROCEDURE — 86618 LYME DISEASE ANTIBODY: CPT

## 2025-01-04 PROCEDURE — 83970 ASSAY OF PARATHORMONE: CPT

## 2025-01-04 PROCEDURE — 84443 ASSAY THYROID STIM HORMONE: CPT

## 2025-01-04 PROCEDURE — 86140 C-REACTIVE PROTEIN: CPT

## 2025-01-04 PROCEDURE — 84166 PROTEIN E-PHORESIS/URINE/CSF: CPT

## 2025-01-05 LAB
B BURGDOR IGG+IGM SER QL IA: NEGATIVE
CRYOGLOB RF SER-ACNC: ABNORMAL [IU]/ML
RHEUMATOID FACT SER QL LA: POSITIVE

## 2025-01-06 ENCOUNTER — TELEPHONE (OUTPATIENT)
Dept: GASTROENTEROLOGY | Facility: CLINIC | Age: 70
End: 2025-01-06

## 2025-01-06 ENCOUNTER — OFFICE VISIT (OUTPATIENT)
Dept: GASTROENTEROLOGY | Facility: CLINIC | Age: 70
End: 2025-01-06
Payer: MEDICARE

## 2025-01-06 ENCOUNTER — TELEPHONE (OUTPATIENT)
Age: 70
End: 2025-01-06

## 2025-01-06 ENCOUNTER — RESULTS FOLLOW-UP (OUTPATIENT)
Dept: OBGYN CLINIC | Facility: CLINIC | Age: 70
End: 2025-01-06

## 2025-01-06 VITALS
BODY MASS INDEX: 26.81 KG/M2 | HEIGHT: 66 IN | WEIGHT: 166.8 LBS | DIASTOLIC BLOOD PRESSURE: 78 MMHG | SYSTOLIC BLOOD PRESSURE: 122 MMHG | TEMPERATURE: 98.2 F

## 2025-01-06 DIAGNOSIS — K59.09 CHRONIC CONSTIPATION: ICD-10-CM

## 2025-01-06 DIAGNOSIS — K21.9 CHRONIC GERD: Primary | ICD-10-CM

## 2025-01-06 DIAGNOSIS — R10.9 CHRONIC ABDOMINAL PAIN: ICD-10-CM

## 2025-01-06 DIAGNOSIS — Z83.719 FAMILY HISTORY OF COLONIC POLYPS: ICD-10-CM

## 2025-01-06 DIAGNOSIS — R10.2 PELVIC PAIN: Primary | ICD-10-CM

## 2025-01-06 DIAGNOSIS — R11.10 REGURGITATION OF FOOD: ICD-10-CM

## 2025-01-06 DIAGNOSIS — G89.29 CHRONIC ABDOMINAL PAIN: ICD-10-CM

## 2025-01-06 LAB
CCP AB SER IA-ACNC: 1.1 (ref ?–10)
TTG IGA SER IA-ACNC: <0.4 U/ML (ref ?–10)

## 2025-01-06 PROCEDURE — G2211 COMPLEX E/M VISIT ADD ON: HCPCS | Performed by: INTERNAL MEDICINE

## 2025-01-06 PROCEDURE — 99204 OFFICE O/P NEW MOD 45 MIN: CPT | Performed by: INTERNAL MEDICINE

## 2025-01-06 RX ORDER — POLYETHYLENE GLYCOL 3350 17 G/17G
17 POWDER, FOR SOLUTION ORAL DAILY
Qty: 510 G | Refills: 5 | Status: SHIPPED | OUTPATIENT
Start: 2025-01-06 | End: 2025-07-05

## 2025-01-06 RX ORDER — SODIUM CHLORIDE, SODIUM LACTATE, POTASSIUM CHLORIDE, CALCIUM CHLORIDE 600; 310; 30; 20 MG/100ML; MG/100ML; MG/100ML; MG/100ML
125 INJECTION, SOLUTION INTRAVENOUS CONTINUOUS
OUTPATIENT
Start: 2025-01-06

## 2025-01-06 RX ORDER — BISACODYL 5 MG/1
20 TABLET, DELAYED RELEASE ORAL ONCE
Qty: 4 TABLET | Refills: 0 | Status: SHIPPED | OUTPATIENT
Start: 2025-01-06 | End: 2025-01-16

## 2025-01-06 NOTE — PATIENT INSTRUCTIONS
Scheduled date of EGD/colonoscopy (as of today):01.23.25  Physician performing EGD/colonoscopy:DR IRWIN  Location of EGD/colonoscopy:[facility]  Desired bowel prep reviewed with patient:2 DAY GOLYTELY  Instructions reviewed with patient by:ROSY  Clearances:  N/A

## 2025-01-06 NOTE — PROGRESS NOTES
Name: Rosalind Guerrero      : 1955      MRN: [id number]  Encounter Provider: Rufino Shah MD  Encounter Date: 2025   Encounter department: [facility]  :  Assessment & Plan  Chronic GERD  Patient reports chronic symptoms of heartburn, regurgitation of food, abdominal pain, constipation.  Regurgitation could be due to esophageal dysmotility, esophageal rings, strictures or uncontrolled acid reflux.  Abdominal pain could be due to uncontrolled constipation, peptic ulcer disease, gastritis, H. pylori, celiac disease.  This can be evaluated with EGD.  Patient agreeable.  Ordered the procedure today.  Her constipation has not been under control despite Metamucil and high-fiber diet.  Discussed with patient about starting MiraLAX daily.  Also increase hydration to at least 64 pounds of water intake per day.  Patient agreeable.  Ordered MiraLAX.  She is due for colonoscopy due to family history of colon polyps.  Patient agreeable to get colonoscopy done.  Procedure and today prep were ordered today.  Orders:    EGD; Future    Regurgitation of food  As above.  Orders:    EGD; Future    Chronic abdominal pain  As above.  Orders:    EGD; Future    Chronic constipation  As above.  Orders:    EGD; Future    polyethylene glycol (MIRALAX) 17 g packet; Take 17 g by mouth daily    Family history of colonic polyps  As above.    Rufino Shah MD  Gastroenterology  [facility]  Date: 2025    Orders:    Colonoscopy; Future    bisacodyl (DULCOLAX) 5 mg EC tablet; Take 4 tablets (20 mg total) by mouth once for 1 dose Colonoscopy prep    polyethylene glycol (GOLYTELY) 4000 mL solution; Take 4,000 mL by mouth once for 1 dose Colonoscopy prep        History of Present Illness   Abdominal Cramping  Associated symptoms include constipation. Pertinent negatives include no arthralgias, dysuria, fever, hematuria or vomiting.   Constipation  Pertinent  negatives include no abdominal pain, back pain, fever or vomiting.     Rosalind Guerrero is a 69 y.o. female with family history of colon polyps, psoriatic arthritis, sees rheumatology, hypertension presents for evaluation.    Patient was seen by primary care physician in January 2024.  At that time due to family history of colon cancer and family history of colon polyps she was due for colonoscopy and she was referred to GI for further evaluation and management plan.    Patient denies any nausea and vomiting.  She reports intermittent heartburn for the last 1 year.  She is noticed occasional regurgitation of food as well.  No swallowing problems.  She is noticed abdominal cramps intermittently for the last few months.  She has had hard to pass stools.  She takes movement Metamucil daily and takes high-fiber diet and that usually used to control the constipation but recently she has noticed hard to pass stool despite these measures.  No blood in stools.  Weight is stable over the past year.  She reports that both her parents had polyps removed from the colon.  Her grandson parent had colon cancer in the past.  Patient had colonoscopy in 2018 at outside facility the results of which were reviewed by me today.  Normal terminal ileum and colon were seen.  Repeat was recommended in 10 years.    Patient had labs in October 2024 which were reviewed by me today and showed normal blood counts, renal function and in February 2024 patient had other labs done which showed normal TSH levels.      Review of Systems   Constitutional:  Negative for chills and fever.   HENT:  Negative for ear pain and sore throat.    Eyes:  Negative for pain and visual disturbance.   Respiratory:  Negative for cough and shortness of breath.    Cardiovascular:  Negative for chest pain and palpitations.   Gastrointestinal:  Positive for constipation. Negative for abdominal pain and vomiting.   Genitourinary:  Negative for dysuria and hematuria.  "  Musculoskeletal:  Negative for arthralgias and back pain.   Skin:  Negative for color change and rash.   Neurological:  Negative for seizures and syncope.   All other systems reviewed and are negative.         Objective   /78   Temp 98.2 °F (36.8 °C)   Ht 5' 6\" (1.676 m)   Wt 75.7 kg (166 lb 12.8 oz)   BMI 26.92 kg/m²      Physical Exam  Vitals and nursing note reviewed.   Constitutional:       General: She is not in acute distress.     Appearance: She is well-developed.   HENT:      Head: Normocephalic and atraumatic.   Eyes:      Conjunctiva/sclera: Conjunctivae normal.   Cardiovascular:      Rate and Rhythm: Normal rate and regular rhythm.      Heart sounds: No murmur heard.  Pulmonary:      Effort: Pulmonary effort is normal. No respiratory distress.      Breath sounds: Normal breath sounds.   Abdominal:      Palpations: Abdomen is soft.      Tenderness: There is no abdominal tenderness.   Musculoskeletal:         General: No swelling.      Cervical back: Neck supple.   Skin:     General: Skin is warm and dry.      Capillary Refill: Capillary refill takes less than 2 seconds.   Neurological:      Mental Status: She is alert.   Psychiatric:         Mood and Affect: Mood normal.           "

## 2025-01-06 NOTE — TELEPHONE ENCOUNTER
----- Message from Agueda Calhoun MD sent at 1/6/2025  3:02 PM EST -----  OK to be done at  endoscopy center     Dr. Calhoun  ----- Message -----  From: Marcio Cabrera MA  Sent: 1/6/2025   2:56 PM EST  To: Soc Md/Np    Good Afternoon.  Is pt fit to have procedure done at [facility]?    Thanks in advance!

## 2025-01-06 NOTE — RESULT ENCOUNTER NOTE
Lm Hua,  Overall, your ultrasound appears normal.  The uterus is a normal size and the lining is the appropriate thickness.  The cystic changes are likely normal changes.  To be cautious, I ordered a repeat ultrasound for you to do in 3-6months to ensure there is no change.  Let me know if you have any questions or concerns,   DrG

## 2025-01-06 NOTE — TELEPHONE ENCOUNTER
Patient called, Dr Chapman recommends repeat ultrasound in 3-6months from 12/26/24 complete TV US.   Please issue script, pt aware to contact central scheduling to then arrange. Please send pt [employer] message once script is ordered.

## 2025-01-07 LAB
ALBUMIN SERPL ELPH-MCNC: 4.36 G/DL (ref 3.2–5.1)
ALBUMIN SERPL ELPH-MCNC: 63.2 % (ref 48–70)
ALBUMIN UR ELPH-MCNC: 100 %
ALP BONE SERPL-MCNC: 14.1 UG/L
ALPHA1 GLOB MFR UR ELPH: 0 %
ALPHA1 GLOB SERPL ELPH-MCNC: 0.28 G/DL (ref 0.15–0.47)
ALPHA1 GLOB SERPL ELPH-MCNC: 4.1 % (ref 1.8–7)
ALPHA2 GLOB MFR UR ELPH: 0 %
ALPHA2 GLOB SERPL ELPH-MCNC: 0.77 G/DL (ref 0.42–1.04)
ALPHA2 GLOB SERPL ELPH-MCNC: 11.2 % (ref 5.9–14.9)
B-GLOBULIN MFR UR ELPH: 0 %
BETA GLOB ABNORMAL SERPL ELPH-MCNC: 0.38 G/DL (ref 0.31–0.57)
BETA1 GLOB SERPL ELPH-MCNC: 5.5 % (ref 4.7–7.7)
BETA2 GLOB SERPL ELPH-MCNC: 5.1 % (ref 3.1–7.9)
BETA2+GAMMA GLOB SERPL ELPH-MCNC: 0.35 G/DL (ref 0.2–0.58)
GAMMA GLOB ABNORMAL SERPL ELPH-MCNC: 0.75 G/DL (ref 0.4–1.66)
GAMMA GLOB MFR UR ELPH: 0 %
GAMMA GLOB SERPL ELPH-MCNC: 10.9 % (ref 6.9–22.3)
IGG/ALB SER: 1.72 {RATIO} (ref 1.1–1.8)
PROT PATTERN SERPL ELPH-IMP: NORMAL
PROT PATTERN UR ELPH-IMP: NORMAL
PROT SERPL-MCNC: 6.9 G/DL (ref 6.4–8.2)
PROT UR-MCNC: 8.1 MG/DL

## 2025-01-07 PROCEDURE — 84166 PROTEIN E-PHORESIS/URINE/CSF: CPT | Performed by: STUDENT IN AN ORGANIZED HEALTH CARE EDUCATION/TRAINING PROGRAM

## 2025-01-07 PROCEDURE — 84165 PROTEIN E-PHORESIS SERUM: CPT | Performed by: STUDENT IN AN ORGANIZED HEALTH CARE EDUCATION/TRAINING PROGRAM

## 2025-01-09 ENCOUNTER — ANESTHESIA EVENT (OUTPATIENT)
Dept: ANESTHESIOLOGY | Facility: HOSPITAL | Age: 70
End: 2025-01-09

## 2025-01-09 ENCOUNTER — ANESTHESIA (OUTPATIENT)
Dept: ANESTHESIOLOGY | Facility: HOSPITAL | Age: 70
End: 2025-01-09

## 2025-01-13 LAB — COLLAGEN CTX SERPL-MCNC: 451 PG/ML

## 2025-01-14 ENCOUNTER — TRANSCRIBE ORDERS (OUTPATIENT)
Dept: LAB | Facility: CLINIC | Age: 70
End: 2025-01-14

## 2025-01-14 ENCOUNTER — APPOINTMENT (OUTPATIENT)
Dept: LAB | Facility: CLINIC | Age: 70
End: 2025-01-14
Payer: MEDICARE

## 2025-01-14 DIAGNOSIS — Z79.899 NEED FOR PROPHYLACTIC CHEMOTHERAPY: Primary | ICD-10-CM

## 2025-01-14 DIAGNOSIS — Z79.899 NEED FOR PROPHYLACTIC CHEMOTHERAPY: ICD-10-CM

## 2025-01-14 PROCEDURE — 36415 COLL VENOUS BLD VENIPUNCTURE: CPT

## 2025-01-14 PROCEDURE — 86480 TB TEST CELL IMMUN MEASURE: CPT

## 2025-01-15 ENCOUNTER — OFFICE VISIT (OUTPATIENT)
Dept: OBGYN CLINIC | Facility: CLINIC | Age: 70
End: 2025-01-15
Payer: MEDICARE

## 2025-01-15 VITALS — BODY MASS INDEX: 26.68 KG/M2 | HEIGHT: 66 IN | WEIGHT: 166 LBS

## 2025-01-15 DIAGNOSIS — S46.011D TRAUMATIC TEAR OF RIGHT ROTATOR CUFF, UNSPECIFIED TEAR EXTENT, SUBSEQUENT ENCOUNTER: Primary | ICD-10-CM

## 2025-01-15 LAB
GAMMA INTERFERON BACKGROUND BLD IA-ACNC: 0.01 IU/ML
M TB IFN-G BLD-IMP: NEGATIVE
M TB IFN-G CD4+ BCKGRND COR BLD-ACNC: 0.02 IU/ML
M TB IFN-G CD4+ BCKGRND COR BLD-ACNC: 0.02 IU/ML
MITOGEN IGNF BCKGRD COR BLD-ACNC: 5.2 IU/ML

## 2025-01-15 PROCEDURE — 99213 OFFICE O/P EST LOW 20 MIN: CPT | Performed by: STUDENT IN AN ORGANIZED HEALTH CARE EDUCATION/TRAINING PROGRAM

## 2025-01-15 NOTE — PROGRESS NOTES
[facility] Sports Medicine Shoulder Follow Up Visit     Assesment:   69 y.o. female 9 months s/p Shoulder Arthroscopic Rotator Cuff Repair - Right and Arthroscopic Subacromial Decompression - Right on 4/15/2024     Plan:    Melita presents for follow-up 9 months status post Shoulder Arthroscopic Rotator Cuff Repair - Right and Arthroscopic Subacromial Decompression - Right on 4/15/2024. She presents well clinically on exam today. We discussed the soreness should continue to improve as she resumes her desired activities and ramps up her home exercises again. Recommend topical analgesics or oral Tylenol or NSAIDs for pain as needed. Recommend heat for muscle soreness or stiffness. She may follow-up as needed in regards to the right shoulder.      Conservative treatment:    Home exercise program for shoulder, including ROM and strenthening.  Instructions given to patient of what exercises to perform.  Let pain guide return to activities.      Imaging:    No imaging was available for review today.      Injection:    No Injection planned at this time.      Surgery:     No surgery is recommended at this point, continue with conservative treatment plan as noted.      Follow up:    Return if symptoms worsen or fail to improve.      Chief Complaint   Patient presents with   • Right Shoulder - Follow-up, Pain         History of Present Illness:    The patient is returns for follow up 9 months status post Shoulder Arthroscopic Rotator Cuff Repair - Right and Arthroscopic Subacromial Decompression - Right on 4/15/2024. A right subacromial corticosteroid injection was administered at her last visit on 10/30/2024. She reports experiencing some relief from this. She has been more active recently with yoga and pickle ball. She was discharged from formal physical therapy in mid-November. She reports being unable to complete home exercises due to family circumstances, but is trying to pick back up now. She reports throwing a rock while  hiking, that caused right shoulder pain. She does not recall a pop at that time. Today, she states raising the arm overhead and reaching across her body is still painful. She rates her pain 3/10 on the pain scale. She feels the pain is mostly sore in nature. She scarcely takes over the counter pain medications.    Shoulder Surgical History:  Shoulder Arthroscopic Rotator Cuff Repair - Right and Arthroscopic Subacromial Decompression - Right on 4/15/2024     Past Medical, Social and Family History:  Past Medical History:   Diagnosis Date   • Anxiety    • Arthritis    • Heart valve disease     Mitro valve prolapse   • Hypertension    • Irritable bowel disease    • Psoriasis    • Psoriatic arthritis (HCC)    • Varicella      Past Surgical History:   Procedure Laterality Date   • JOINT REPLACEMENT Left 2016   • LEFT OOPHORECTOMY Left 2007    benign cyst   • OOPHORECTOMY     • WI SURGICAL ARTHROSCOPY SHERRON W/CORACOACRM LIGM RLS Right 4/15/2024    Procedure: ARTHROSCOPIC SUBACROMIAL DECOMPRESSION;  Surgeon: Joseph Larkin DO;  Location: [facility] MAIN OR;  Service: Orthopedics   • WI SURGICAL ARTHROSCOPY SHOULDER W/ROTATOR CUFF RPR Right 4/15/2024    Procedure: SHOULDER ARTHROSCOPIC ROTATOR CUFF REPAIR;  Surgeon: Joseph Larkin DO;  Location: [facility] MAIN OR;  Service: Orthopedics   • REPLACEMENT TOTAL KNEE Left      Allergies   Allergen Reactions   • Sulfa Antibiotics Hives     Current Outpatient Medications on File Prior to Visit   Medication Sig Dispense Refill   • Cholecalciferol (Vitamin D3) 50 MCG (2000 UT) capsule      • clotrimazole-betamethasone (LOTRISONE) 1-0.05 % cream Apply topically 2 (two) times a day 45 g 1   • dicyclomine (BENTYL) 10 mg capsule TAKE 1 CAPSULE BY MOUTH 3 TIMES A DAY BEFORE MEALS 270 capsule 1   • fluticasone (FLONASE) 50 mcg/act nasal spray SPRAY 2 SPRAYS INTO EACH NOSTRIL EVERY DAY 48 mL 1   • polyethylene glycol (MIRALAX) 17 g packet Take 17 g by mouth daily 510 g 5   • Probiotic  Product (PROBIOTIC BLEND PO) Take by mouth     • psyllium (METAMUCIL) 58.6 % packet Take 1 packet by mouth 2 (two) times a day     • Tiadylt  MG 24 hr capsule TAKE 1 CAPSULE BY MOUTH EVERY DAY 90 capsule 3   • traZODone (DESYREL) 50 mg tablet TAKE 0.5 TABLETS (25 MG TOTAL) BY MOUTH DAILY AT BEDTIME AS NEEDED FOR SLEEP 45 tablet 1   • Turmeric 400 MG CAPS Take by mouth 2 (two) times a day     • bisacodyl (DULCOLAX) 5 mg EC tablet Take 4 tablets (20 mg total) by mouth once for 1 dose Colonoscopy prep (Patient not taking: Reported on 1/15/2025) 4 tablet 0   • guselkumab (TREMFYA) subcutaneous injection Inject 100 mg under the skin every 2 (two) months Medication on hold until insurance clearance (Patient not taking: Reported on 9/11/2024)     • polyethylene glycol (GOLYTELY) 4000 mL solution Take 4,000 mL by mouth once for 1 dose Colonoscopy prep 4000 mL 0   • [DISCONTINUED] OMEGA-3 KRILL OIL PO Take by mouth (Patient not taking: Reported on 12/31/2024)     • [DISCONTINUED] traMADol (Ultram) 50 mg tablet Take 1 tablet (50 mg total) by mouth every 6 (six) hours as needed for moderate pain (Patient not taking: Reported on 10/11/2024) 25 tablet 0     No current facility-administered medications on file prior to visit.     Social History     Socioeconomic History   • Marital status:      Spouse name: Not on file   • Number of children: Not on file   • Years of education: Not on file   • Highest education level: Not on file   Occupational History   • Occupation: retired   Tobacco Use   • Smoking status: Never   • Smokeless tobacco: Never   Vaping Use   • Vaping status: Never Used   Substance and Sexual Activity   • Alcohol use: Not Currently     Alcohol/week: 1.0 standard drink of alcohol     Types: 1 Glasses of wine per week     Comment: With dinner   • Drug use: Not Currently     Types: Marijuana     Comment: Many years ago   • Sexual activity: Not Currently     Partners: Male     Birth control/protection:  "Post-menopausal     Comment: Not active in a long time.  No partners, no interest   Other Topics Concern   • Not on file   Social History Narrative   • Not on file     Social Drivers of Health     Financial Resource Strain: Low Risk  (1/19/2024)    Overall Financial Resource Strain (CARDIA)    • Difficulty of Paying Living Expenses: Not hard at all   Food Insecurity: Not on file   Transportation Needs: No Transportation Needs (1/19/2024)    PRAPARE - Transportation    • Lack of Transportation (Medical): No    • Lack of Transportation (Non-Medical): No   Physical Activity: Not on file   Stress: Not on file   Social Connections: Not on file   Intimate Partner Violence: Not on file   Housing Stability: Not on file       I have reviewed the past medical, surgical, social and family history, medications and allergies as documented in the EMR.    Review of systems: ROS is negative other than that noted in the HPI.  Constitutional: Negative for fatigue and fever.      Physical Exam:    Height 5' 6\" (1.676 m), weight 75.3 kg (166 lb), not currently breastfeeding.    General/Constitutional: NAD, well developed, well nourished  HENT: Normocephalic, atraumatic  CV: Intact distal pulses, regular rate  Resp: No respiratory distress or labored breathing  Lymphatic: No lymphadenopathy palpated  Neuro: Alert and Oriented x 3, no focal deficits  Psych: Normal mood, normal affect, normal judgement, normal behavior  Skin: Warm, dry, no rashes, no erythema      Shoulder focused exam:        Visual inspection of the shoulder demonstrates normal contour without atrophy  No evidence of scapular dyskinesia or atrophy.  No scapular winging  Active and passive range of motion demonstrates forward flexion to 160° bilaterally, external rotation is approximately 50° bilaterally, internal rotation to T12   Rotator cuff strength is 5/5 to resisted forward flexion, 5/5 resisted abduction, 5/5 resisted external rotation, 5/5 resisted internal " rotation  No tenderness to palpation at the distal clavicle, AC joint, acromion, or scapular spine  Mild pain with cross-body adduction      UE NV Exam: +2 Radial pulses bilaterally  Sensation intact to light touch C5-T1 bilaterally, Radial/median/ulnar nerve motor intact    Cervical ROM is full without pain, numbness or tingling      Shoulder Imaging    No imaging was performed today      Scribe Attestation    I,:  Rosario Bellamy am acting as a scribe while in the presence of the attending physician.:       I,:  Joseph Larkin DO personally performed the services described in this documentation    as scribed in my presence.:

## 2025-01-16 ENCOUNTER — OFFICE VISIT (OUTPATIENT)
Dept: FAMILY MEDICINE CLINIC | Facility: CLINIC | Age: 70
End: 2025-01-16
Payer: MEDICARE

## 2025-01-16 VITALS
TEMPERATURE: 98 F | OXYGEN SATURATION: 96 % | DIASTOLIC BLOOD PRESSURE: 90 MMHG | RESPIRATION RATE: 16 BRPM | HEART RATE: 81 BPM | HEIGHT: 66 IN | BODY MASS INDEX: 26.87 KG/M2 | WEIGHT: 167.2 LBS | SYSTOLIC BLOOD PRESSURE: 154 MMHG

## 2025-01-16 DIAGNOSIS — K58.1 IRRITABLE BOWEL SYNDROME WITH CONSTIPATION: ICD-10-CM

## 2025-01-16 DIAGNOSIS — S46.011D TRAUMATIC TEAR OF RIGHT ROTATOR CUFF, UNSPECIFIED TEAR EXTENT, SUBSEQUENT ENCOUNTER: ICD-10-CM

## 2025-01-16 DIAGNOSIS — E78.00 PURE HYPERCHOLESTEROLEMIA: ICD-10-CM

## 2025-01-16 DIAGNOSIS — Z00.00 MEDICARE ANNUAL WELLNESS VISIT, SUBSEQUENT: Primary | ICD-10-CM

## 2025-01-16 DIAGNOSIS — N89.8 VAGINAL DISCHARGE: ICD-10-CM

## 2025-01-16 DIAGNOSIS — H61.23 CERUMINOSIS, BILATERAL: ICD-10-CM

## 2025-01-16 DIAGNOSIS — G57.11 MERALGIA PARESTHETICA OF RIGHT SIDE: ICD-10-CM

## 2025-01-16 DIAGNOSIS — L40.50 PSORIATIC ARTHRITIS (HCC): Chronic | ICD-10-CM

## 2025-01-16 DIAGNOSIS — I10 HYPERTENSION, ESSENTIAL, BENIGN: Chronic | ICD-10-CM

## 2025-01-16 PROCEDURE — 99214 OFFICE O/P EST MOD 30 MIN: CPT | Performed by: FAMILY MEDICINE

## 2025-01-16 PROCEDURE — G2211 COMPLEX E/M VISIT ADD ON: HCPCS | Performed by: FAMILY MEDICINE

## 2025-01-16 PROCEDURE — G0439 PPPS, SUBSEQ VISIT: HCPCS | Performed by: FAMILY MEDICINE

## 2025-01-16 NOTE — ASSESSMENT & PLAN NOTE
Blood pressure is elevated.  History of whitecoat syndrome  Continue Tiadylt  mg daily  Patient will start monitoring at home and will update me with her readings in 10 to 14 days.

## 2025-01-16 NOTE — ASSESSMENT & PLAN NOTE
Constipation and GERD symptoms   Metamucil and Miralax PRN  Pending colonoscopy and EGD, Dr. Shah cannular [id number]

## 2025-01-16 NOTE — PATIENT INSTRUCTIONS
Please take Metamucil or any fiber supplement of your choice daily.  You may use MiraLAX as needed  I suspect that right thigh paresthesia is likely meralgia paresthetica, benign condition of entrapment of right cutaneous nerve associated with sedentary lifestyle and weight gain  Please proceed with lipid panel in early spring prior to OV with  4/23/25  Please schedule follow-up with your eye doctor  Blood pressure today is 154/90.  Please start checking blood pressures at home and update me in 7 to 14 days  Please use Debrox/peroxide eardrops 2 to 3 days prior to your ear flush

## 2025-01-16 NOTE — PROGRESS NOTES
Name: Rosalind Guerrero      : 1955      MRN: [id number]  Encounter Provider: Diana Sam MD  Encounter Date: 2025   Encounter department: [facility]    Assessment & Plan  Medicare annual wellness visit, subsequent         Irritable bowel syndrome with constipation  Constipation and GERD symptoms   Metamucil and Miralax PRN  Pending colonoscopy and EGD, Dr. Shah cannular [id number]         Psoriatic arthritis (HCC)  Rheumatology -   Dermatology-   Pending  start of Tremfya       Hypertension, essential, benign  Blood pressure is elevated.  History of whitecoat syndrome  Continue Tiadylt  mg daily  Patient will start monitoring at home and will update me with her readings in 10 to 14 days.         Pure hypercholesterolemia  No Rx.  Proceed with blood work  Orders:  •  Lipid Panel with Direct LDL reflex; Future    Ceruminosis, bilateral  Start Debrox OTC.  Schedule cerumen removal       Meralgia paresthetica of right side  Painless  paresthesia right anterior thigh.  No paresthesias elsewhere.  Recent weight gain.  Symptoms resolved with walking and stretching.  Hold off further workup given typical clinical presentation benign nature of this condition       Traumatic tear of right rotator cuff, unspecified tear extent, subsequent encounter  Surgery 2024, Dr. Larkin  Patient made a good recovery  She remains in physical therapy at [facility]       Vaginal discharge  Under care of GYN  No concerning findings on exam  Awake ultrasound revealed abnormal endometrial stripe of 5 mm with small internal cystic changes  Dr. Chapman plans to follow-up with ultrasound in 3-6 months  Patient reports no symptoms of pelvic pain, vaginal discharge has significantly lessened            Preventive health issues were discussed with patient, and age appropriate screening tests were ordered as noted in patient's After Visit Summary. Personalized health advice and  appropriate referrals for health education or preventive services given if needed, as noted in patient's After Visit Summary.    Patient Instructions   Please take Metamucil or any fiber supplement of your choice daily.  You may use MiraLAX as needed  I suspect that right thigh paresthesia is likely meralgia paresthetica, benign condition of entrapment of right cutaneous nerve associated with sedentary lifestyle and weight gain  Please proceed with lipid panel in early spring prior to OV with  4/23/25  Please schedule follow-up with your eye doctor  Blood pressure today is 154/90.  Please start checking blood pressures at home and update me in 7 to 14 days  Please use Debrox/peroxide eardrops 2 to 3 days prior to your ear flush      History of Present Illness     Follow-up/annual Medicare wellness   s/p  right  shoulder RC arthroscopy -Dr.Tyler Larkin, April 2024  Surgery went well. In PT now,[facility]  Still residual pain, improving.Back to yoga.She has not been able to play pickle ball yet.     Noticed vaginal d/c within past few months along with development of lower abdominal pain     Seen by GYN,  had pelvic ultrasound   1. Top normal endometrial thickness measuring 5 mm with a small internal cystic changes.   2. Post left oophorectomy. Nonvisualization of right ovary. No suspicious adnexal mass.    Patient stated lower abdominal pain has completely resolved.  Discharge is lighter and less frequent.  Pelvic ultrasound revealed abnormal endometrial stripe of 5 mm with internal cystic changes, patient will be repeating pelvic ultrasound again in 3 to 4 months.      Patient was eval by [facility] for constipation. -  scheduled  for colonoscopy/EGD on January 23, 2025  Occ. S/o GERD    Pending  DEXA and mammography - scheduled    Decreased hearing lately    Onset of painless intermittent right anterior thigh paresthesias.  Transient, usually resolve with walking/moving around, worse when  she is sedentary     Left hip skin lesion, recently removed by dermatology, patient is concerned about possible cellulitis.  She keeps it covered     Complains of right lower rib cage/RUQ pain x 2 days.  Resolved/significantly improved.  No nausea vomiting or diarrhea.  Pain is not related to meals.  Pain has not radiated elsewhere.    Psoriatic arthritis.  Patient is under care of rheumatology dermatology.  Planning to restart Tremfya soon    Results of recent blood work performed in January 2025 reviewed.  Patient is due for lipid panel.                 Patient Care Team:  Diana Sam MD as PCP - General (Family Medicine)    Review of Systems   Constitutional: Negative.    HENT:  Positive for hearing loss.    Eyes: Negative.    Respiratory: Negative.     Cardiovascular: Negative.    Gastrointestinal:  Positive for abdominal pain.        As per HPI   Endocrine: Negative.    Genitourinary:  Positive for vaginal discharge.        As per HPI   Musculoskeletal:  Positive for arthralgias.   Neurological:  Positive for numbness (right thigh numbness anterior).   Hematological: Negative.      Medical History Reviewed by provider this encounter:  Tobacco  Allergies  Meds  Problems  Med Hx  Surg Hx  Fam Hx       Annual Wellness Visit Questionnaire   Rosalind is here for her Subsequent Wellness visit. Last Medicare Wellness visit information reviewed, patient interviewed and updates made to the record today.      Health Risk Assessment:   Patient rates overall health as good. Patient feels that their physical health rating is same. Patient is satisfied with their life. Eyesight was rated as slightly worse. Hearing was rated as same. Patient feels that their emotional and mental health rating is same. Patients states they are never, rarely angry. Patient states they are never, rarely unusually tired/fatigued. Pain experienced in the last 7 days has been some. Patient's pain rating has been 2/10. Patient states  that she has experienced no weight loss or gain in last 6 months.     Depression Screening:   PHQ-2 Score: 0      Fall Risk Screening:   In the past year, patient has experienced: no history of falling in past year      Urinary Incontinence Screening:   Patient has leaked urine accidently in the last six months.     Home Safety:  Patient does not have trouble with stairs inside or outside of their home. Patient has working smoke alarms and has working carbon monoxide detector. Home safety hazards include: none.     Nutrition:   Current diet is Regular and Limited junk food.     Medications:   Patient is currently taking over-the-counter supplements. OTC medications include: see medication list. Patient is able to manage medications.     Activities of Daily Living (ADLs)/Instrumental Activities of Daily Living (IADLs):   Walk and transfer into and out of bed and chair?: Yes  Dress and groom yourself?: Yes    Bathe or shower yourself?: Yes    Feed yourself? Yes  Do your laundry/housekeeping?: Yes  Manage your money, pay your bills and track your expenses?: Yes  Make your own meals?: Yes    Do your own shopping?: Yes    Previous Hospitalizations:   Any hospitalizations or ED visits within the last 12 months?: Yes    How many hospitalizations have you had in the last year?: 1-2    Advance Care Planning:   Living will: Yes    Durable POA for healthcare: Yes    Advanced directive: Yes      Cognitive Screening:   Provider or family/friend/caregiver concerned regarding cognition?: No    PREVENTIVE SCREENINGS      Cardiovascular Screening:    General: Screening Not Indicated and History Lipid Disorder      Diabetes Screening:     General: Screening Current      Colorectal Cancer Screening:     General: Screening Current    Due for: Colonoscopy - High Risk      Breast Cancer Screening:     General: Screening Current    Due for: Mammogram        Cervical Cancer Screening:    General: Screening Not Indicated and Screening  Current      Osteoporosis Screening:    General: Screening Current      Abdominal Aortic Aneurysm (AAA) Screening:        General: Screening Not Indicated      Lung Cancer Screening:     General: Screening Not Indicated      Hepatitis C Screening:    General: Screening Not Indicated    Screening, Brief Intervention, and Referral to Treatment (SBIRT)    Screening  Typical number of drinks in a day: 0  Typical number of drinks in a week: 1  Interpretation: Low risk drinking behavior.    Single Item Drug Screening:  How often have you used an illegal drug (including marijuana) or a prescription medication for non-medical reasons in the past year? never    Single Item Drug Screen Score: 0  Interpretation: Negative screen for possible drug use disorder    Brief Intervention  Alcohol & drug use screenings were reviewed. No concerns regarding substance use disorder identified.     Other Counseling Topics:   Regular weightbearing exercise and calcium and vitamin D intake.     Social Drivers of Health     Financial Resource Strain: Low Risk  (1/19/2024)    Overall Financial Resource Strain (CARDIA)    • Difficulty of Paying Living Expenses: Not hard at all   Food Insecurity: No Food Insecurity (1/16/2025)    Hunger Vital Sign    • Worried About Running Out of Food in the Last Year: Never true    • Ran Out of Food in the Last Year: Never true   Transportation Needs: No Transportation Needs (1/16/2025)    PRAPARE - Transportation    • Lack of Transportation (Medical): No    • Lack of Transportation (Non-Medical): No   Housing Stability: Low Risk  (1/16/2025)    Housing Stability Vital Sign    • Unable to Pay for Housing in the Last Year: No    • Number of Times Moved in the Last Year: 0    • Homeless in the Last Year: No   Utilities: Not At Risk (1/16/2025)    [facility] Utilities    • Threatened with loss of utilities: No     No results found.    Objective   /90   Pulse 81   Temp 98 °F (36.7 °C) (Temporal)   Resp 16   Ht  "5' 6\" (1.676 m)   Wt 75.8 kg (167 lb 3.2 oz)   SpO2 96%   BMI 26.99 kg/m²     Physical Exam  Vitals and nursing note reviewed.   Constitutional:       General: She is not in acute distress.     Appearance: Normal appearance. She is well-developed. She is not ill-appearing.   HENT:      Head: Normocephalic and atraumatic.      Right Ear: There is impacted cerumen.      Left Ear: There is impacted cerumen.   Eyes:      General: No scleral icterus.     Conjunctiva/sclera: Conjunctivae normal.   Neck:      Vascular: No carotid bruit.   Cardiovascular:      Rate and Rhythm: Normal rate and regular rhythm.      Heart sounds: Normal heart sounds. No murmur heard.  Pulmonary:      Effort: Pulmonary effort is normal. No respiratory distress.      Breath sounds: Normal breath sounds.   Abdominal:      General: Bowel sounds are normal. There is no distension or abdominal bruit.      Palpations: Abdomen is soft.      Tenderness: There is no abdominal tenderness. There is no guarding or rebound.      Comments: No right upper quadrant tenderness.Previous tenderness corresponded to costochondral pain affecting lower anterior ribs/intercostal spaces   Musculoskeletal:      Cervical back: Neck supple. No rigidity.      Right lower leg: No edema.      Left lower leg: No edema.   Skin:     General: Skin is warm.      Comments: Left hip: Status post skin lesion removal.  Healing well.  Normal granulation tissue.  No evidence of cellulitis.   Neurological:      General: No focal deficit present.      Mental Status: She is alert and oriented to person, place, and time.   Psychiatric:         Behavior: Behavior normal.         Thought Content: Thought content normal.         "

## 2025-01-19 PROBLEM — H61.23 CERUMINOSIS, BILATERAL: Status: ACTIVE | Noted: 2025-01-19

## 2025-01-19 PROBLEM — N89.8 VAGINAL DISCHARGE: Status: ACTIVE | Noted: 2025-01-19

## 2025-01-19 PROBLEM — G57.11 MERALGIA PARESTHETICA OF RIGHT SIDE: Status: ACTIVE | Noted: 2025-01-19

## 2025-01-19 PROBLEM — Z00.00 MEDICARE ANNUAL WELLNESS VISIT, SUBSEQUENT: Status: ACTIVE | Noted: 2025-01-19

## 2025-01-19 NOTE — ASSESSMENT & PLAN NOTE
Painless  paresthesia right anterior thigh.  No paresthesias elsewhere.  Recent weight gain.  Symptoms resolved with walking and stretching.  Hold off further workup given typical clinical presentation benign nature of this condition

## 2025-01-19 NOTE — ASSESSMENT & PLAN NOTE
Under care of GYN  No concerning findings on exam  Awake ultrasound revealed abnormal endometrial stripe of 5 mm with small internal cystic changes  Dr. Chapman plans to follow-up with ultrasound in 3-6 months  Patient reports no symptoms of pelvic pain, vaginal discharge has significantly lessened

## 2025-01-19 NOTE — ASSESSMENT & PLAN NOTE
Surgery April 2024, Dr. Larkin  Patient made a good recovery  She remains in physical therapy at [facility]

## 2025-01-21 ENCOUNTER — TELEPHONE (OUTPATIENT)
Dept: GASTROENTEROLOGY | Facility: MEDICAL CENTER | Age: 70
End: 2025-01-21

## 2025-01-21 NOTE — TELEPHONE ENCOUNTER
Pt called asking to come later. I called [facility] and spoke with Melinda. Melinda stated she will call pt back and see what she can do

## 2025-01-22 ENCOUNTER — TELEPHONE (OUTPATIENT)
Age: 70
End: 2025-01-22

## 2025-01-23 ENCOUNTER — HOSPITAL ENCOUNTER (OUTPATIENT)
Dept: GASTROENTEROLOGY | Facility: MEDICAL CENTER | Age: 70
Setting detail: OUTPATIENT SURGERY
End: 2025-01-23
Payer: MEDICARE

## 2025-01-23 ENCOUNTER — ANESTHESIA (OUTPATIENT)
Dept: GASTROENTEROLOGY | Facility: MEDICAL CENTER | Age: 70
End: 2025-01-23
Payer: MEDICARE

## 2025-01-23 ENCOUNTER — TELEPHONE (OUTPATIENT)
Age: 70
End: 2025-01-23

## 2025-01-23 ENCOUNTER — ANESTHESIA EVENT (OUTPATIENT)
Dept: GASTROENTEROLOGY | Facility: MEDICAL CENTER | Age: 70
End: 2025-01-23
Payer: MEDICARE

## 2025-01-23 VITALS
BODY MASS INDEX: 25.71 KG/M2 | OXYGEN SATURATION: 97 % | RESPIRATION RATE: 20 BRPM | DIASTOLIC BLOOD PRESSURE: 78 MMHG | HEART RATE: 84 BPM | TEMPERATURE: 96.8 F | WEIGHT: 160 LBS | HEIGHT: 66 IN | SYSTOLIC BLOOD PRESSURE: 115 MMHG

## 2025-01-23 DIAGNOSIS — K21.9 CHRONIC GERD: ICD-10-CM

## 2025-01-23 DIAGNOSIS — K20.90 ESOPHAGITIS: Primary | ICD-10-CM

## 2025-01-23 DIAGNOSIS — R10.9 CHRONIC ABDOMINAL PAIN: ICD-10-CM

## 2025-01-23 DIAGNOSIS — G89.29 CHRONIC ABDOMINAL PAIN: ICD-10-CM

## 2025-01-23 DIAGNOSIS — R11.10 REGURGITATION OF FOOD: ICD-10-CM

## 2025-01-23 DIAGNOSIS — Z83.719 FAMILY HISTORY OF COLONIC POLYPS: ICD-10-CM

## 2025-01-23 DIAGNOSIS — K59.09 CHRONIC CONSTIPATION: ICD-10-CM

## 2025-01-23 PROCEDURE — 43251 EGD REMOVE LESION SNARE: CPT | Performed by: INTERNAL MEDICINE

## 2025-01-23 PROCEDURE — 45385 COLONOSCOPY W/LESION REMOVAL: CPT | Performed by: INTERNAL MEDICINE

## 2025-01-23 PROCEDURE — 88305 TISSUE EXAM BY PATHOLOGIST: CPT | Performed by: PATHOLOGY

## 2025-01-23 PROCEDURE — 43239 EGD BIOPSY SINGLE/MULTIPLE: CPT | Performed by: INTERNAL MEDICINE

## 2025-01-23 RX ORDER — OMEPRAZOLE 40 MG/1
40 CAPSULE, DELAYED RELEASE ORAL DAILY
Qty: 30 CAPSULE | Refills: 5 | Status: SHIPPED | OUTPATIENT
Start: 2025-01-23 | End: 2025-07-22

## 2025-01-23 RX ORDER — LIDOCAINE HYDROCHLORIDE 20 MG/ML
INJECTION, SOLUTION EPIDURAL; INFILTRATION; INTRACAUDAL; PERINEURAL AS NEEDED
Status: DISCONTINUED | OUTPATIENT
Start: 2025-01-23 | End: 2025-01-23

## 2025-01-23 RX ORDER — PROPOFOL 10 MG/ML
INJECTION, EMULSION INTRAVENOUS AS NEEDED
Status: DISCONTINUED | OUTPATIENT
Start: 2025-01-23 | End: 2025-01-23

## 2025-01-23 RX ORDER — PROPOFOL 10 MG/ML
INJECTION, EMULSION INTRAVENOUS CONTINUOUS PRN
Status: DISCONTINUED | OUTPATIENT
Start: 2025-01-23 | End: 2025-01-23

## 2025-01-23 RX ORDER — SODIUM CHLORIDE, SODIUM LACTATE, POTASSIUM CHLORIDE, CALCIUM CHLORIDE 600; 310; 30; 20 MG/100ML; MG/100ML; MG/100ML; MG/100ML
125 INJECTION, SOLUTION INTRAVENOUS CONTINUOUS
Status: SHIPPED | OUTPATIENT
Start: 2025-01-23

## 2025-01-23 RX ADMIN — SODIUM CHLORIDE, SODIUM LACTATE, POTASSIUM CHLORIDE, AND CALCIUM CHLORIDE 125 ML/HR: .6; .31; .03; .02 INJECTION, SOLUTION INTRAVENOUS at 09:43

## 2025-01-23 RX ADMIN — LIDOCAINE HYDROCHLORIDE 80 MG: 20 INJECTION, SOLUTION EPIDURAL; INFILTRATION; INTRACAUDAL at 10:05

## 2025-01-23 RX ADMIN — PROPOFOL 30 MG: 10 INJECTION, EMULSION INTRAVENOUS at 10:07

## 2025-01-23 RX ADMIN — PROPOFOL 100 MG: 10 INJECTION, EMULSION INTRAVENOUS at 10:05

## 2025-01-23 RX ADMIN — PROPOFOL 100 MCG/KG/MIN: 10 INJECTION, EMULSION INTRAVENOUS at 10:12

## 2025-01-23 RX ADMIN — Medication 40 MG: at 10:26

## 2025-01-23 RX ADMIN — PROPOFOL 30 MG: 10 INJECTION, EMULSION INTRAVENOUS at 10:11

## 2025-01-23 RX ADMIN — PROPOFOL 30 MG: 10 INJECTION, EMULSION INTRAVENOUS at 10:09

## 2025-01-23 NOTE — ANESTHESIA POSTPROCEDURE EVALUATION
Post-Op Assessment Note    CV Status:  Stable    Pain management: adequate       Mental Status:  Alert and awake   Hydration Status:  Euvolemic   PONV Controlled:  Controlled   Airway Patency:  Patent     Post Op Vitals Reviewed: Yes    No anethesia notable event occurred.    Staff: CRNA           Last Filed PACU Vitals:  Vitals Value Taken Time   Temp 97.2    Pulse 78    /65    Resp 18    SpO2 99

## 2025-01-23 NOTE — H&P
History and Physical -  [facility]  Rosalind Guerrero 69 y.o. female MRN: [id number]                  HPI: Rosalind Guerrero is a 69 y.o. year old female who presents for EGD to investigate GERD, regurgitation of food, abdominal pain, constipation and colonoscopy for family history of colon polyps.      REVIEW OF SYSTEMS: Per the HPI, and otherwise unremarkable.    Historical Information   Past Medical History:   Diagnosis Date    Anxiety     Arthritis     Heart valve disease     Mitral valve prolapse per physician in [place] noted after childbirth, per [facility] cardiologist, not present    Hypertension     Irritable bowel disease     Psoriasis     Psoriatic arthritis (HCC)     Varicella      Past Surgical History:   Procedure Laterality Date    COLONOSCOPY      JOINT REPLACEMENT Left 2016    LEFT OOPHORECTOMY Left 2007    benign cyst    OOPHORECTOMY      DE SURGICAL ARTHROSCOPY SHERRON W/CORACOACRM LIGM RLS Right 04/15/2024    Procedure: ARTHROSCOPIC SUBACROMIAL DECOMPRESSION;  Surgeon: Joseph Larkin DO;  Location: [facility] MAIN OR;  Service: Orthopedics    DE SURGICAL ARTHROSCOPY SHOULDER W/ROTATOR CUFF RPR Right 04/15/2024    Procedure: SHOULDER ARTHROSCOPIC ROTATOR CUFF REPAIR;  Surgeon: Joseph Larkin DO;  Location: [facility] MAIN OR;  Service: Orthopedics    REPLACEMENT TOTAL KNEE Left      Social History   Social History     Substance and Sexual Activity   Alcohol Use Not Currently    Alcohol/week: 1.0 standard drink of alcohol    Types: 1 Glasses of wine per week    Comment: With dinner     Social History     Substance and Sexual Activity   Drug Use Not Currently    Types: Marijuana    Comment: Many years ago     Social History     Tobacco Use   Smoking Status Never   Smokeless Tobacco Never     Family History   Problem Relation Age of Onset    Stroke Mother     Supraventricular tachycardia Mother     Psoriasis Mother     Psoriatic Arthritis Mother     Hypertension Mother         Mother  "and father    Dementia Mother         Mother    Squamous cell carcinoma Mother         skin- unknown age    Hypertension Father     Heart disease Father     Heart attack Father     Kidney cancer Sister         20's    Cancer Sister         Kidney removed due to cancer    No Known Problems Sister     No Known Problems Daughter     Colon cancer Maternal Grandmother 71    Cancer Maternal Grandmother         Colon cancer    No Known Problems Maternal Grandfather     No Known Problems Paternal Grandmother     Prostate cancer Paternal Grandfather         90's    No Known Problems Maternal Aunt     No Known Problems Maternal Aunt        Meds/Allergies     Not in a hospital admission.    Allergies   Allergen Reactions    Sulfa Antibiotics Hives       Objective     Blood pressure (!) 171/95, pulse 87, temperature (!) 96.8 °F (36 °C), temperature source Temporal, resp. rate 20, height 5' 6\" (1.676 m), weight 72.6 kg (160 lb), SpO2 98%, not currently breastfeeding.      PHYSICAL EXAM    Gen: NAD  CV: RRR  CHEST: Clear  ABD: soft, NT/ND  EXT: no edema      ASSESSMENT/PLAN: Rosalind Guerrero is a 69 y.o. year old female who presents for EGD to investigate GERD, regurgitation of food, abdominal pain, constipation and colonoscopy for family history of colon polyps. The patient is stable and optimized for the procedure, we reviewed risk and benefits. Risk include but not limited to infection, bleeding, perforation and missing a lesion.          "

## 2025-01-23 NOTE — ANESTHESIA PREPROCEDURE EVALUATION
Procedure:  COLONOSCOPY  EGD    Relevant Problems   CARDIO   (+) Hypertension, essential, benign   (+) Pure hypercholesterolemia      MUSCULOSKELETAL   (+) Psoriatic arthritis (HCC)        Physical Exam    Airway    Mallampati score: II  TM Distance: >3 FB  Neck ROM: full     Dental       Cardiovascular  Rhythm: regular, No weak pulses    Pulmonary   No stridor    Other Findings  post-pubertal.      Anesthesia Plan  ASA Score- 2     Anesthesia Type- IV sedation with anesthesia with ASA Monitors.         Additional Monitors:     Airway Plan:            Plan Factors-    Chart reviewed.   Existing labs reviewed. Patient summary reviewed.                  Induction- intravenous.    Postoperative Plan-         Informed Consent- Anesthetic plan and risks discussed with patient.  I personally reviewed this patient with the CRNA. Discussed and agreed on the Anesthesia Plan with the CRNA..      NPO Status:  Vitals Value Taken Time   Date of last liquid 01/23/25 01/23/25 0915   Time of last liquid 0400 01/23/25 0915   Date of last solid 01/21/25 01/23/25 0915   Time of last solid 1200 01/23/25 0915

## 2025-01-23 NOTE — TELEPHONE ENCOUNTER
Melita ruiz  Primary Care [facility] Pod Clinical (supporting Diana Sam MD)         1/23/25  3:48 PM  Terrance Dillon, per your request, here are my recent BP readings:  126/88  128/92  121/78  117/77  117/86  121/97  If you'd like more, please let me know.  Thanks,  Melita

## 2025-01-24 ENCOUNTER — DOCUMENTATION (OUTPATIENT)
Dept: ADMINISTRATIVE | Facility: OTHER | Age: 70
End: 2025-01-24

## 2025-01-24 NOTE — PROGRESS NOTES
01/24/25 12:10 PM    Patient Reported home blood pressure(s) documented in chart.    Thank you.  Arely Serrato MA  [facility]

## 2025-01-24 NOTE — PROGRESS NOTES
Veronica Thoams, RN  Patient Reported Team20 hours ago (3:53 PM)     AS  Pt reported BP log. Thank you     Veronica Thomas RN20 hours ago (3:53 PM)     AS  Melita ruiz  Primary [facility] Pod Clinical (supporting Diana Sam MD)         1/23/25  3:48 PM  Terrance Dillon, per your request, here are my recent BP readings:  126/88  128/92  121/78  117/77  117/86  121/97  If you'd like more, please let me know.  Thanks,  Melita

## 2025-01-28 ENCOUNTER — RESULTS FOLLOW-UP (OUTPATIENT)
Dept: GASTROENTEROLOGY | Facility: CLINIC | Age: 70
End: 2025-01-28

## 2025-01-28 ENCOUNTER — OFFICE VISIT (OUTPATIENT)
Dept: FAMILY MEDICINE CLINIC | Facility: CLINIC | Age: 70
End: 2025-01-28
Payer: MEDICARE

## 2025-01-28 VITALS
RESPIRATION RATE: 16 BRPM | SYSTOLIC BLOOD PRESSURE: 140 MMHG | HEART RATE: 74 BPM | BODY MASS INDEX: 26.52 KG/M2 | TEMPERATURE: 97.5 F | HEIGHT: 66 IN | DIASTOLIC BLOOD PRESSURE: 90 MMHG | OXYGEN SATURATION: 93 % | WEIGHT: 165 LBS

## 2025-01-28 DIAGNOSIS — H61.23 BILATERAL IMPACTED CERUMEN: Primary | ICD-10-CM

## 2025-01-28 PROCEDURE — 88305 TISSUE EXAM BY PATHOLOGIST: CPT | Performed by: PATHOLOGY

## 2025-01-28 PROCEDURE — 99213 OFFICE O/P EST LOW 20 MIN: CPT | Performed by: NURSE PRACTITIONER

## 2025-01-28 PROCEDURE — 69210 REMOVE IMPACTED EAR WAX UNI: CPT | Performed by: NURSE PRACTITIONER

## 2025-01-28 RX ORDER — MUPIROCIN 20 MG/G
OINTMENT TOPICAL
COMMUNITY
Start: 2025-01-06

## 2025-01-28 NOTE — PROGRESS NOTES
"Name: Rosalind Guerrero      : 1955      MRN: [id number]  Encounter Provider: COREY Escobedo  Encounter Date: 2025   Encounter department: [facility]  :  Assessment & Plan  Bilateral impacted cerumen  Successful clearance of bilateral cerumen impaction.   She will call with any questions or concerns.   Orders:  •  Ear cerumen removal           History of Present Illness       Rosalind Guerrero is a 69 year old female presenting today for cerumen impaction bilaterally.   She has been using Debrox drops last few days.             Review of Systems   Constitutional: Negative.    HENT:  Positive for hearing loss.         Bilateral cerumen impaction.       Objective   /90   Pulse 74   Temp 97.5 °F (36.4 °C) (Temporal)   Resp 16   Ht 5' 6\" (1.676 m)   Wt 74.8 kg (165 lb)   SpO2 93%   BMI 26.63 kg/m²      Physical Exam  Vitals and nursing note reviewed.   Constitutional:       General: She is not in acute distress.     Appearance: Normal appearance. She is not ill-appearing.   HENT:      Right Ear: There is impacted cerumen.      Left Ear: There is impacted cerumen.      Ears:      Comments: Bilateral cerumen impaction, unable to view both TM's.   After successful clearance of cerumen, TM's appear normal.   Neurological:      Mental Status: She is alert.     Ear cerumen removal    Date/Time: 2025 2:20 PM    Performed by: COREY Escobedo  Authorized by: COREY Escobedo  Universal Protocol:  Consent: Verbal consent obtained.  Consent given by: patient  Patient understanding: patient states understanding of the procedure being performed    Patient location:  Clinic  Procedure details:     Local anesthetic:  None    Location:  Both ears    Procedure type: irrigation with instrumentation      Instrumentation: curette      Approach:  External  Post-procedure details:     Complication:  None    Post-procedure hearing quality: unchanged.    Patient tolerance of procedure:  " Tolerated well, no immediate complications         Current Outpatient Medications:   •  Cholecalciferol (Vitamin D3) 50 MCG (2000 UT) capsule, , Disp: , Rfl:   •  clotrimazole-betamethasone (LOTRISONE) 1-0.05 % cream, Apply topically 2 (two) times a day, Disp: 45 g, Rfl: 1  •  dicyclomine (BENTYL) 10 mg capsule, TAKE 1 CAPSULE BY MOUTH 3 TIMES A DAY BEFORE MEALS, Disp: 270 capsule, Rfl: 1  •  fluticasone (FLONASE) 50 mcg/act nasal spray, SPRAY 2 SPRAYS INTO EACH NOSTRIL EVERY DAY, Disp: 48 mL, Rfl: 1  •  omeprazole (PriLOSEC) 40 MG capsule, Take 1 capsule (40 mg total) by mouth daily, Disp: 30 capsule, Rfl: 5  •  polyethylene glycol (MIRALAX) 17 g packet, Take 17 g by mouth daily, Disp: 510 g, Rfl: 5  •  Probiotic Product (PROBIOTIC BLEND PO), Take by mouth, Disp: , Rfl:   •  psyllium (METAMUCIL) 58.6 % packet, Take 1 packet by mouth 2 (two) times a day, Disp: , Rfl:   •  Tiadylt  MG 24 hr capsule, TAKE 1 CAPSULE BY MOUTH EVERY DAY, Disp: 90 capsule, Rfl: 3  •  traZODone (DESYREL) 50 mg tablet, TAKE 0.5 TABLETS (25 MG TOTAL) BY MOUTH DAILY AT BEDTIME AS NEEDED FOR SLEEP, Disp: 45 tablet, Rfl: 1  •  Turmeric 400 MG CAPS, Take by mouth 2 (two) times a day, Disp: , Rfl:   •  guselkumab (TREMFYA) subcutaneous injection, Inject 100 mg under the skin every 2 (two) months Medication on hold until insurance clearance (Patient not taking: Reported on 9/11/2024), Disp: , Rfl:   •  mupirocin (BACTROBAN) 2 % ointment, APPLY TOPICALLY TO LEFT HIP THREE TIMES DAILY (Patient not taking: Reported on 1/28/2025), Disp: , Rfl:   •  polyethylene glycol (GOLYTELY) 4000 mL solution, Take 4,000 mL by mouth once for 1 dose Colonoscopy prep, Disp: 4000 mL, Rfl: 0

## 2025-02-03 ENCOUNTER — TELEPHONE (OUTPATIENT)
Age: 70
End: 2025-02-03

## 2025-02-03 NOTE — TELEPHONE ENCOUNTER
Patient called back and stated that they found some forms online they can use.      No further action needed

## 2025-02-03 NOTE — TELEPHONE ENCOUNTER
Patient called inquring if the office would have blank healthcare by proxy or living will forms that they can fill out.    Please advise out to patient to confirm.

## 2025-02-14 DIAGNOSIS — K20.90 ESOPHAGITIS: ICD-10-CM

## 2025-02-14 DIAGNOSIS — K21.9 CHRONIC GERD: ICD-10-CM

## 2025-02-14 DIAGNOSIS — R11.10 REGURGITATION OF FOOD: ICD-10-CM

## 2025-02-14 RX ORDER — OMEPRAZOLE 40 MG/1
40 CAPSULE, DELAYED RELEASE ORAL DAILY
Qty: 90 CAPSULE | Refills: 1 | Status: SHIPPED | OUTPATIENT
Start: 2025-02-14 | End: 2025-08-13

## 2025-02-18 PROBLEM — H61.23 CERUMINOSIS, BILATERAL: Status: RESOLVED | Noted: 2025-01-19 | Resolved: 2025-02-18

## 2025-02-18 PROBLEM — Z00.00 MEDICARE ANNUAL WELLNESS VISIT, SUBSEQUENT: Status: RESOLVED | Noted: 2025-01-19 | Resolved: 2025-02-18

## 2025-02-25 DIAGNOSIS — I10 HYPERTENSION, ESSENTIAL, BENIGN: ICD-10-CM

## 2025-02-26 ENCOUNTER — PATIENT MESSAGE (OUTPATIENT)
Dept: FAMILY MEDICINE CLINIC | Facility: CLINIC | Age: 70
End: 2025-02-26

## 2025-02-26 RX ORDER — DILTIAZEM HYDROCHLORIDE 120 MG/1
120 CAPSULE, EXTENDED RELEASE ORAL DAILY
Qty: 90 CAPSULE | Refills: 1 | Status: SHIPPED | OUTPATIENT
Start: 2025-02-26

## 2025-02-28 ENCOUNTER — OFFICE VISIT (OUTPATIENT)
Dept: FAMILY MEDICINE CLINIC | Facility: CLINIC | Age: 70
End: 2025-02-28
Payer: MEDICARE

## 2025-02-28 ENCOUNTER — OFFICE VISIT (OUTPATIENT)
Age: 70
End: 2025-02-28
Payer: MEDICARE

## 2025-02-28 VITALS
WEIGHT: 163.4 LBS | SYSTOLIC BLOOD PRESSURE: 144 MMHG | BODY MASS INDEX: 26.37 KG/M2 | OXYGEN SATURATION: 96 % | TEMPERATURE: 97.1 F | HEART RATE: 75 BPM | DIASTOLIC BLOOD PRESSURE: 86 MMHG

## 2025-02-28 VITALS — DIASTOLIC BLOOD PRESSURE: 92 MMHG | BODY MASS INDEX: 26.63 KG/M2 | WEIGHT: 165 LBS | SYSTOLIC BLOOD PRESSURE: 132 MMHG

## 2025-02-28 DIAGNOSIS — R93.5 ABNORMAL ULTRASOUND OF ENDOMETRIUM: ICD-10-CM

## 2025-02-28 DIAGNOSIS — N93.9 VAGINAL SPOTTING: Primary | ICD-10-CM

## 2025-02-28 DIAGNOSIS — N36.2 URETHRAL CARUNCLE: ICD-10-CM

## 2025-02-28 DIAGNOSIS — N93.9 VAGINAL SPOTTING: ICD-10-CM

## 2025-02-28 DIAGNOSIS — K21.00 HIATAL HERNIA WITH GERD AND ESOPHAGITIS: Primary | ICD-10-CM

## 2025-02-28 DIAGNOSIS — K44.9 HIATAL HERNIA WITH GERD AND ESOPHAGITIS: Primary | ICD-10-CM

## 2025-02-28 PROCEDURE — G2211 COMPLEX E/M VISIT ADD ON: HCPCS | Performed by: FAMILY MEDICINE

## 2025-02-28 PROCEDURE — 99214 OFFICE O/P EST MOD 30 MIN: CPT | Performed by: OBSTETRICS & GYNECOLOGY

## 2025-02-28 PROCEDURE — 99213 OFFICE O/P EST LOW 20 MIN: CPT | Performed by: FAMILY MEDICINE

## 2025-02-28 PROCEDURE — 88305 TISSUE EXAM BY PATHOLOGIST: CPT | Performed by: PATHOLOGY

## 2025-02-28 NOTE — PROGRESS NOTES
Name: Rosalind Guerrero      : 1955      MRN: [id number]  Encounter Provider: Diana Sam MD  Encounter Date: 2025   Encounter department: [facility]    Assessment & Plan  Hiatal hernia with GERD and esophagitis  EGD 2025.  Negative biopsies.  Negative H. Pylori.1 cm hiatal hernia, grade A esophagitis.    GI recommends omeprazole 40 mg daily.patient is worried about long-term PPI side effects.  I recommend to follow GI instructions and stay on omeprazole as directed.  Patient will contact me in 6 to 9 months.  If his symptoms are well-controlled we may consider reducing dose to every other day or using 20 mg daily.   Patient is scheduled for DEXA scan in April.       Vaginal spotting  Persistent symptoms of vaginal spotting/discharge within past 3 months.  No pelvic pain.  No urinary symptoms.  Pelvic ultrasound performed in 2024 reveals Normal endometrial thickness.  I recommend to proceed with repeat ultrasound and schedule reevaluation with GYN     Pelvic ultrasound 2024:Top normal endometrial thickness measuring 5 mm with small internal cystic changes.   Orders:  •  Ambulatory Referral to Obstetrics / Gynecology; Future  •  US pelvis complete w transvaginal; Future       DEXA scan in April  History of Present Illness     Patient presents to discuss recent GI evaluation and PPI therapy.      Patient was evaluated by  [facility] and had EGD.  We discussed results of recent endoscopy and biopsy.      Patient was diagnosed with grade a esophagitis and hiatal hernia.  Biopsies were negative.  No evidence of H. pylori.      Has been using omeprazole 40 mg daily as per GI    Patient is concerned regarding new onset of intermittent scant brownish vaginal discharge within past 3 months.  Wears a liner.  No overt bleeding.  She was evaluated by [facility] and had pelvic ultrasound.  No pelvic pain or cramping.  Pelvic ultrasound performed in December  revealed top normal endometrial thickness at 5 mm.  Patient is scheduled for repeat study in May.        Review of Systems   Constitutional: Negative.    HENT: Negative.     Respiratory: Negative.     Cardiovascular: Negative.    Gastrointestinal: Negative.    Genitourinary:  Positive for vaginal discharge.     Past Medical History:   Diagnosis Date   • Anxiety    • Arthritis    • Heart valve disease     Mitral valve prolapse per physician in [place] noted after childbirth, per [facility] cardiologist, not present   • Hypertension    • Irritable bowel disease    • Psoriasis    • Psoriatic arthritis (HCC)    • Varicella      Past Surgical History:   Procedure Laterality Date   • COLONOSCOPY     • JOINT REPLACEMENT Left 2016   • LEFT OOPHORECTOMY Left 2007    benign cyst   • OOPHORECTOMY     • NM SURGICAL ARTHROSCOPY SHERRON W/CORACOACRM LIGM RLS Right 04/15/2024    Procedure: ARTHROSCOPIC SUBACROMIAL DECOMPRESSION;  Surgeon: Joseph Larkin DO;  [facility];  Service: Orthopedics   • NM SURGICAL ARTHROSCOPY SHOULDER W/ROTATOR CUFF RPR Right 04/15/2024    Procedure: SHOULDER ARTHROSCOPIC ROTATOR CUFF REPAIR;  Surgeon: Joseph Larkin DO;  [facility];  Service: Orthopedics   • REPLACEMENT TOTAL KNEE Left      Family History   Problem Relation Age of Onset   • Stroke Mother    • Supraventricular tachycardia Mother    • Psoriasis Mother    • Psoriatic Arthritis Mother    • Hypertension Mother         Mother and father   • Dementia Mother         Mother   • Squamous cell carcinoma Mother         skin- unknown age   • Hypertension Father    • Heart disease Father    • Heart attack Father    • Kidney cancer Sister         20's   • Cancer Sister         Kidney removed due to cancer   • No Known Problems Sister    • No Known Problems Daughter    • Colon cancer Maternal Grandmother 71   • Cancer Maternal Grandmother         Colon cancer   • No Known Problems Maternal Grandfather    • No Known  Problems Paternal Grandmother    • Prostate cancer Paternal Grandfather         90's   • No Known Problems Maternal Aunt    • No Known Problems Maternal Aunt      Social History     Tobacco Use   • Smoking status: Never   • Smokeless tobacco: Never   Vaping Use   • Vaping status: Never Used   Substance and Sexual Activity   • Alcohol use: Not Currently     Alcohol/week: 1.0 standard drink of alcohol     Types: 1 Glasses of wine per week     Comment: With dinner   • Drug use: Not Currently     Types: Marijuana     Comment: Many years ago   • Sexual activity: Not Currently     Partners: Male     Birth control/protection: Post-menopausal     Comment: Not active in a long time.  No partners, no interest     Current Outpatient Medications on File Prior to Visit   Medication Sig   • Cholecalciferol (Vitamin D3) 50 MCG (2000 UT) capsule    • clotrimazole-betamethasone (LOTRISONE) 1-0.05 % cream Apply topically 2 (two) times a day   • dicyclomine (BENTYL) 10 mg capsule TAKE 1 CAPSULE BY MOUTH 3 TIMES A DAY BEFORE MEALS   • fluticasone (FLONASE) 50 mcg/act nasal spray SPRAY 2 SPRAYS INTO EACH NOSTRIL EVERY DAY   • omeprazole (PriLOSEC) 40 MG capsule TAKE 1 CAPSULE (40 MG TOTAL) BY MOUTH DAILY.   • polyethylene glycol (MIRALAX) 17 g packet Take 17 g by mouth daily   • Probiotic Product (PROBIOTIC BLEND PO) Take by mouth   • psyllium (METAMUCIL) 58.6 % packet Take 1 packet by mouth 2 (two) times a day   • Tiadylt  MG 24 hr capsule TAKE 1 CAPSULE BY MOUTH EVERY DAY   • traZODone (DESYREL) 50 mg tablet TAKE 0.5 TABLETS (25 MG TOTAL) BY MOUTH DAILY AT BEDTIME AS NEEDED FOR SLEEP   • Turmeric 400 MG CAPS Take by mouth 2 (two) times a day   • guselkumab (TREMFYA) subcutaneous injection Inject 100 mg under the skin every 2 (two) months Medication on hold until insurance clearance (Patient not taking: Reported on 9/11/2024)   • mupirocin (BACTROBAN) 2 % ointment APPLY TOPICALLY TO LEFT HIP THREE TIMES DAILY (Patient not taking:  Reported on 1/28/2025)     Allergies   Allergen Reactions   • Sulfa Antibiotics Hives     Immunization History   Administered Date(s) Administered   • COVID-19 J&J (Yas) vaccine 0.5 mL 03/08/2021, 11/24/2021   • COVID-19 Pfizer vac (Dylan-sucrose, gray cap) 12 yr+ IM 08/29/2022   • INFLUENZA 10/01/2015, 10/01/2018, 10/23/2021, 10/07/2022, 09/15/2023   • Influenza LAIV (Nasal) 10/20/2008   • Influenza Quadrivalent 3 years and older 10/23/2021   • Influenza Split 09/20/2011   • Influenza Split High Dose Preservative Free IM 09/26/2024   • Influenza, seasonal, injectable, preservative free 10/17/2016, 09/10/2020   • Pneumococcal Conjugate Vaccine 20-valent (Pcv20), Polysace 01/09/2023   • Pneumococcal Polysaccharide PPV23 04/03/2000, 01/14/2009   • Td (adult), adsorbed 01/01/2002   • Tdap 05/10/2012   • Zoster Vaccine Recombinant 02/27/2020, 06/16/2020     Objective   /86   Pulse 75   Temp (!) 97.1 °F (36.2 °C) (Temporal)   Wt 74.1 kg (163 lb 6.4 oz)   SpO2 96%   BMI 26.37 kg/m²     Physical Exam  Vitals and nursing note reviewed.   Constitutional:       General: She is not in acute distress.     Appearance: Normal appearance. She is not ill-appearing.   Neurological:      General: No focal deficit present.      Mental Status: She is alert and oriented to person, place, and time.   Psychiatric:         Mood and Affect: Mood normal.         Behavior: Behavior normal.         Thought Content: Thought content normal.

## 2025-02-28 NOTE — ASSESSMENT & PLAN NOTE
EGD January 2025.  Negative biopsies.  Negative H. Pylori.1 cm hiatal hernia, grade A esophagitis.    GI recommends omeprazole 40 mg daily.patient is worried about long-term PPI side effects.  I recommend to follow GI instructions and stay on omeprazole as directed.  Patient will contact me in 6 to 9 months.  If his symptoms are well-controlled we may consider reducing dose to every other day or using 20 mg daily.   Patient is scheduled for DEXA scan in April.

## 2025-02-28 NOTE — PROGRESS NOTES
Name: Rosalind Guerrero      : 1955      MRN: [id number]  Encounter Provider: Rita Camp MD  Encounter Date: 2025   Encounter department: [facility] [place] [facility]  :  Assessment & Plan  Vaginal spotting    Orders:    Ambulatory Referral to Obstetrics / Gynecology    Tissue Exam    Abnormal ultrasound of endometrium    Urethral caruncle    - plan course of Estrace to caruncle if biopsy benign      History of Present Illness     Rosalind Guerrero is a 69 y.o. female who presents for ongoing brown vaginal discharge for approximately 3 months.  She denies cramping.  No trauma to area.  Not sexually active.  Sono in 2024 showed 5 mm cystic endometrium. Only needs to wear a pantiliner.      History obtained from: patient      Past Medical History   Past Medical History:   Diagnosis Date    Anxiety     Arthritis     Heart valve disease     Mitral valve prolapse per physician in [place] noted after childbirth, per [facility] cardiologist, not present    Hypertension     Irritable bowel disease     Psoriasis     Psoriatic arthritis (HCC)     Varicella      Past Surgical History:   Procedure Laterality Date    COLONOSCOPY      JOINT REPLACEMENT Left 2016    LEFT OOPHORECTOMY Left     benign cyst    OOPHORECTOMY      [place] SURGICAL ARTHROSCOPY SHERRON W/CORACOACRM LIGM RLS Right 04/15/2024    Procedure: ARTHROSCOPIC SUBACROMIAL DECOMPRESSION;  Surgeon: Joseph Larkin DO;  [facility]: [facility];  Service: Orthopedics    VT SURGICAL ARTHROSCOPY SHOULDER W/ROTATOR CUFF RPR Right 04/15/2024    Procedure: SHOULDER ARTHROSCOPIC ROTATOR CUFF REPAIR;  Surgeon: Joseph Larkin DO;  Location: [facility];  Service: Orthopedics    REPLACEMENT TOTAL KNEE Left      Family History   Problem Relation Age of Onset    Stroke Mother     Supraventricular tachycardia Mother     Psoriasis Mother     Psoriatic Arthritis Mother     Hypertension Mother         Mother and father    Dementia Mother          Mother    Squamous cell carcinoma Mother         skin- unknown age    Hypertension Father     Heart disease Father     Heart attack Father     Kidney cancer Sister         20's    Cancer Sister         Kidney removed due to cancer    No Known Problems Sister     No Known Problems Daughter     Colon cancer Maternal Grandmother 71    Cancer Maternal Grandmother         Colon cancer    No Known Problems Maternal Grandfather     No Known Problems Paternal Grandmother     Prostate cancer Paternal Grandfather         90's    No Known Problems Maternal Aunt     No Known Problems Maternal Aunt       reports that she has never smoked. She has never used smokeless tobacco. She reports that she does not currently use alcohol after a past usage of about 1.0 standard drink of alcohol per week. She reports that she does not currently use drugs after having used the following drugs: Marijuana.  Current Outpatient Medications   Medication Instructions    Cholecalciferol (Vitamin D3) 50 MCG (2000 UT) capsule No dose, route, or frequency recorded.    clotrimazole-betamethasone (LOTRISONE) 1-0.05 % cream Topical, 2 times daily    dicyclomine (BENTYL) 10 mg capsule TAKE 1 CAPSULE BY MOUTH 3 TIMES A DAY BEFORE MEALS    fluticasone (FLONASE) 50 mcg/act nasal spray SPRAY 2 SPRAYS INTO EACH NOSTRIL EVERY DAY    guselkumab (TREMFYA) 100 mg, Every 2 months    mupirocin (BACTROBAN) 2 % ointment APPLY TOPICALLY TO LEFT HIP THREE TIMES DAILY    omeprazole (PRILOSEC) 40 mg, Oral, Daily    polyethylene glycol (MIRALAX) 17 g, Oral, Daily    Probiotic Product (PROBIOTIC BLEND PO) Take by mouth    psyllium (METAMUCIL) 58.6 % packet 1 packet, 2 times daily    Tiadylt  mg, Oral, Daily    traZODone (DESYREL) 25 mg, Oral, Daily at bedtime PRN    Turmeric 400 MG CAPS 2 times daily     Allergies   Allergen Reactions    Sulfa Antibiotics Hives      Current Outpatient Medications on File Prior to Visit   Medication Sig Dispense Refill     Cholecalciferol (Vitamin D3) 50 MCG (2000 UT) capsule       clotrimazole-betamethasone (LOTRISONE) 1-0.05 % cream Apply topically 2 (two) times a day 45 g 1    dicyclomine (BENTYL) 10 mg capsule TAKE 1 CAPSULE BY MOUTH 3 TIMES A DAY BEFORE MEALS 270 capsule 1    fluticasone (FLONASE) 50 mcg/act nasal spray SPRAY 2 SPRAYS INTO EACH NOSTRIL EVERY DAY 48 mL 1    omeprazole (PriLOSEC) 40 MG capsule TAKE 1 CAPSULE (40 MG TOTAL) BY MOUTH DAILY. 90 capsule 1    polyethylene glycol (MIRALAX) 17 g packet Take 17 g by mouth daily 510 g 5    Probiotic Product (PROBIOTIC BLEND PO) Take by mouth      psyllium (METAMUCIL) 58.6 % packet Take 1 packet by mouth 2 (two) times a day      Tiadylt  MG 24 hr capsule TAKE 1 CAPSULE BY MOUTH EVERY DAY 90 capsule 1    traZODone (DESYREL) 50 mg tablet TAKE 0.5 TABLETS (25 MG TOTAL) BY MOUTH DAILY AT BEDTIME AS NEEDED FOR SLEEP 45 tablet 1    Turmeric 400 MG CAPS Take by mouth 2 (two) times a day      guselkumab (TREMFYA) subcutaneous injection Inject 100 mg under the skin every 2 (two) months Medication on hold until insurance clearance (Patient not taking: Reported on 9/11/2024)      mupirocin (BACTROBAN) 2 % ointment APPLY TOPICALLY TO LEFT HIP THREE TIMES DAILY (Patient not taking: Reported on 1/28/2025)      [DISCONTINUED] polyethylene glycol (GOLYTELY) 4000 mL solution Take 4,000 mL by mouth once for 1 dose Colonoscopy prep 4000 mL 0     No current facility-administered medications on file prior to visit.      Social History     Tobacco Use    Smoking status: Never    Smokeless tobacco: Never   Vaping Use    Vaping status: Never Used   Substance and Sexual Activity    Alcohol use: Not Currently     Alcohol/week: 1.0 standard drink of alcohol     Types: 1 Glasses of wine per week     Comment: With dinner    Drug use: Not Currently     Types: Marijuana     Comment: Many years ago    Sexual activity: Not Currently     Partners: Male     Birth control/protection: Post-menopausal      Comment: Not active in a long time.  No partners, no interest     Review of Systems   Constitutional: Negative for chills, diaphoresis, fever and malaise/fatigue.   Gastrointestinal:  Negative for abdominal pain, change in bowel habit, nausea and vomiting.   Genitourinary:  Positive for non-menstrual bleeding. Negative for dysuria, frequency and pelvic pain.   Neurological:  Negative for dizziness, headaches, light-headedness and weakness.     /92 (BP Location: Left arm, Patient Position: Sitting, Cuff Size: Adult)   Wt 74.8 kg (165 lb)   BMI 26.63 kg/m²     Physical Exam  Constitutional:       Appearance: Normal appearance.   Genitourinary:      Vulva normal.      No vaginal discharge, erythema, bleeding or ulceration.      Mild vaginal atrophy present.     No cervical discharge, friability or lesion.      Urethral meatus caruncle present.  HENT:      Head: Normocephalic.   Cardiovascular:      Rate and Rhythm: Normal rate and regular rhythm.   Pulmonary:      Effort: Pulmonary effort is normal.   Abdominal:      General: There is no distension.   Musculoskeletal:         General: No swelling.   Neurological:      General: No focal deficit present.      Mental Status: She is alert and oriented to person, place, and time.   Skin:     General: Skin is warm and dry.   Psychiatric:         Mood and Affect: Mood normal.         Behavior: Behavior normal.   Vitals reviewed.

## 2025-02-28 NOTE — PATIENT INSTRUCTIONS
Please continue on omeprazole 40 mg once a day.  If symptoms of reflux will be well-controlled in 6 months we can discuss switching Rx to lower dose of 20 mg daily or using 40 mg every other day  Please schedule repeat pelvic ultrasound and re-evaluation with GYN

## 2025-03-02 PROBLEM — N93.9 VAGINAL SPOTTING: Status: ACTIVE | Noted: 2025-03-02

## 2025-03-03 NOTE — ASSESSMENT & PLAN NOTE
Persistent symptoms of vaginal spotting/discharge within past 3 months.  No pelvic pain.  No urinary symptoms.  Pelvic ultrasound performed in December 2024 reveals Normal endometrial thickness.  I recommend to proceed with repeat ultrasound and schedule reevaluation with GYN     Pelvic ultrasound 12/26/2024:Top normal endometrial thickness measuring 5 mm with small internal cystic changes.   Orders:  •  Ambulatory Referral to Obstetrics / Gynecology; Future  •  US pelvis complete w transvaginal; Future

## 2025-03-04 ENCOUNTER — RESULTS FOLLOW-UP (OUTPATIENT)
Age: 70
End: 2025-03-04

## 2025-03-04 DIAGNOSIS — N36.2 URETHRAL CARUNCLE: Primary | ICD-10-CM

## 2025-03-04 PROCEDURE — 88305 TISSUE EXAM BY PATHOLOGIST: CPT | Performed by: PATHOLOGY

## 2025-03-04 RX ORDER — ESTRADIOL 0.1 MG/G
CREAM VAGINAL
Qty: 42.5 G | Refills: 0 | Status: SHIPPED | OUTPATIENT
Start: 2025-03-04

## 2025-03-04 NOTE — TELEPHONE ENCOUNTER
Pt  called back  read  note  verbatim   she expressed understanding  and request  RX   for estrogen  cream  be sent to [facility]

## 2025-04-02 ENCOUNTER — HOSPITAL ENCOUNTER (OUTPATIENT)
Dept: BONE DENSITY | Facility: IMAGING CENTER | Age: 70
Discharge: HOME | End: 2025-04-02
Payer: MEDICARE

## 2025-04-02 ENCOUNTER — HOSPITAL ENCOUNTER (OUTPATIENT)
Dept: MAMMOGRAPHY | Facility: IMAGING CENTER | Age: 70
Discharge: HOME | End: 2025-04-02
Payer: MEDICARE

## 2025-04-02 VITALS — WEIGHT: 163.6 LBS | HEIGHT: 65 IN | BODY MASS INDEX: 27.26 KG/M2

## 2025-04-02 DIAGNOSIS — Z13.820 ENCOUNTER FOR SCREENING FOR OSTEOPOROSIS: ICD-10-CM

## 2025-04-02 DIAGNOSIS — S46.011D TRAUMATIC TEAR OF RIGHT ROTATOR CUFF, UNSPECIFIED TEAR EXTENT, SUBSEQUENT ENCOUNTER: Primary | ICD-10-CM

## 2025-04-02 PROCEDURE — 77063 BREAST TOMOSYNTHESIS BI: CPT

## 2025-04-02 PROCEDURE — 77067 SCR MAMMO BI INCL CAD: CPT

## 2025-04-02 PROCEDURE — 77080 DXA BONE DENSITY AXIAL: CPT

## 2025-04-03 ENCOUNTER — TELEPHONE (OUTPATIENT)
Dept: OBGYN CLINIC | Facility: HOSPITAL | Age: 70
End: 2025-04-03

## 2025-04-03 NOTE — TELEPHONE ENCOUNTER
Caller: Melita    Doctor: Dr. Joseph Villalpando    Reason for call: Patient need physical therapy script faxed to a formerly [facility] in [place].  Patient would like a call once script has been successfully faxed. This will need to be printed and faxed manually.      Fax# [phone]    Call back#: [phone]

## 2025-04-04 ENCOUNTER — RESULTS FOLLOW-UP (OUTPATIENT)
Dept: FAMILY MEDICINE CLINIC | Facility: CLINIC | Age: 70
End: 2025-04-04

## 2025-04-04 ENCOUNTER — OFFICE VISIT (OUTPATIENT)
Age: 70
End: 2025-04-04
Payer: MEDICARE

## 2025-04-04 VITALS — DIASTOLIC BLOOD PRESSURE: 86 MMHG | BODY MASS INDEX: 27.62 KG/M2 | WEIGHT: 166 LBS | SYSTOLIC BLOOD PRESSURE: 122 MMHG

## 2025-04-04 DIAGNOSIS — N36.2 URETHRAL CARUNCLE: Primary | ICD-10-CM

## 2025-04-04 PROCEDURE — 99213 OFFICE O/P EST LOW 20 MIN: CPT | Performed by: OBSTETRICS & GYNECOLOGY

## 2025-04-07 NOTE — TELEPHONE ENCOUNTER
Veronica put a PT script in on 4/2 are you able to fax that over to the number above and then call Melita and let her know please?

## 2025-04-07 NOTE — PROGRESS NOTES
GYN Follow Up Visit      HPI:  Patient is here for recheck of urethral caruncle.  Using Estrace as directed.  Notes overall improvement in sensation and no bleeding.  A bit sensitive to touch.  No urinary symptoms.      PMH, PSH, Meds, Allergies, SocHx, FamHx reviewed and no changes noted.    ROS:  pertinent findings in HPI    Vitals:    04/04/25 1348   BP: 122/86       Physical Exam  Constitutional:       Appearance: Normal appearance.   Genitourinary:      Genitourinary Comments: Atrophy present but improved, no lesions or fissures      Urethral meatus caruncle present.     Urethral meatus exam comments: 50% decreased in size.  HENT:      Head: Normocephalic.   Cardiovascular:      Rate and Rhythm: Normal rate and regular rhythm.   Pulmonary:      Effort: Pulmonary effort is normal.   Abdominal:      General: There is no distension.   Musculoskeletal:         General: No swelling.   Neurological:      General: No focal deficit present.      Mental Status: She is alert and oriented to person, place, and time.   Skin:     General: Skin is warm and dry.   Psychiatric:         Mood and Affect: Mood normal.         Behavior: Behavior normal.   Vitals reviewed.      Impression/Plan:     Urethral caruncle (Primary)    - continue Estrace to area nightly; directed patient where to apply  - recheck with annual in May

## 2025-04-08 ENCOUNTER — RESULTS FOLLOW-UP (OUTPATIENT)
Dept: FAMILY MEDICINE CLINIC | Facility: CLINIC | Age: 70
End: 2025-04-08

## 2025-04-17 DIAGNOSIS — F51.04 PSYCHOPHYSIOLOGICAL INSOMNIA: ICD-10-CM

## 2025-04-18 ENCOUNTER — APPOINTMENT (OUTPATIENT)
Dept: LAB | Facility: CLINIC | Age: 70
End: 2025-04-18
Payer: MEDICARE

## 2025-04-18 DIAGNOSIS — E78.00 PURE HYPERCHOLESTEROLEMIA: ICD-10-CM

## 2025-04-18 LAB
CHOLEST SERPL-MCNC: 255 MG/DL (ref ?–200)
HDLC SERPL-MCNC: 88 MG/DL
LDLC SERPL CALC-MCNC: 146 MG/DL (ref 0–100)
TRIGL SERPL-MCNC: 103 MG/DL (ref ?–150)

## 2025-04-18 PROCEDURE — 36415 COLL VENOUS BLD VENIPUNCTURE: CPT

## 2025-04-18 PROCEDURE — 80061 LIPID PANEL: CPT

## 2025-04-18 RX ORDER — TRAZODONE HYDROCHLORIDE 50 MG/1
25 TABLET ORAL
Qty: 45 TABLET | Refills: 0 | Status: SHIPPED | OUTPATIENT
Start: 2025-04-18 | End: 2025-04-21

## 2025-04-21 DIAGNOSIS — F51.04 PSYCHOPHYSIOLOGICAL INSOMNIA: ICD-10-CM

## 2025-04-21 RX ORDER — TRAZODONE HYDROCHLORIDE 50 MG/1
50 TABLET ORAL
Qty: 90 TABLET | Refills: 1 | Status: SHIPPED | OUTPATIENT
Start: 2025-04-21

## 2025-04-23 ENCOUNTER — OFFICE VISIT (OUTPATIENT)
Dept: CARDIOLOGY CLINIC | Facility: CLINIC | Age: 70
End: 2025-04-23
Payer: MEDICARE

## 2025-04-23 VITALS
SYSTOLIC BLOOD PRESSURE: 156 MMHG | HEART RATE: 96 BPM | DIASTOLIC BLOOD PRESSURE: 88 MMHG | OXYGEN SATURATION: 97 % | WEIGHT: 165.7 LBS | HEIGHT: 65 IN | BODY MASS INDEX: 27.61 KG/M2

## 2025-04-23 DIAGNOSIS — R03.0 ELEVATED BP WITHOUT DIAGNOSIS OF HYPERTENSION: ICD-10-CM

## 2025-04-23 DIAGNOSIS — I10 HYPERTENSION, ESSENTIAL, BENIGN: Chronic | ICD-10-CM

## 2025-04-23 DIAGNOSIS — E78.00 PURE HYPERCHOLESTEROLEMIA: Primary | ICD-10-CM

## 2025-04-23 PROCEDURE — G2211 COMPLEX E/M VISIT ADD ON: HCPCS | Performed by: INTERNAL MEDICINE

## 2025-04-23 PROCEDURE — 99214 OFFICE O/P EST MOD 30 MIN: CPT | Performed by: INTERNAL MEDICINE

## 2025-04-23 RX ORDER — ROSUVASTATIN CALCIUM 5 MG/1
5 TABLET, COATED ORAL DAILY
Qty: 90 TABLET | Refills: 3 | Status: SHIPPED | OUTPATIENT
Start: 2025-04-23

## 2025-04-23 NOTE — ASSESSMENT & PLAN NOTE
I do think some slight dietary adjustment like less dessert, and maybe less toast regularly in the morning could help her achieve a 3 to 5 pound weight loss which may have benefit for her blood pressure  She has intermittent elevated diastolic blood pressures as her main concern currently despite being on Tiadylt 120 mg daily

## 2025-04-23 NOTE — ASSESSMENT & PLAN NOTE
Calcium score 186  ASCVD risk is now intermediate  Previous normal vascular triple screen in 2022  She does meet criteria currently for low-dose statin therapy  I have discussed rosuvastatin 5 mg daily which I think will be highly beneficial.  3-month follow-up lipids would be recommended

## 2025-04-23 NOTE — PROGRESS NOTES
[facility]  Office Consultation  Rosalind Guerrero 69 y.o. female MRN: [id number]        Assessment & Plan  Pure hypercholesterolemia  Calcium score 186  ASCVD risk is now intermediate  Previous normal vascular triple screen in 2022  She does meet criteria currently for low-dose statin therapy  I have discussed rosuvastatin 5 mg daily which I think will be highly beneficial.  3-month follow-up lipids would be recommended  Hypertension, essential, benign  I do think some slight dietary adjustment like less dessert, and maybe less toast regularly in the morning could help her achieve a 3 to 5 pound weight loss which may have benefit for her blood pressure  She has intermittent elevated diastolic blood pressures as her main concern currently despite being on Tiadylt 120 mg daily      Plan    1 year follow-up      HPI: Rosalind Guerrero is a 69 y.o. year old female with hypertension, a family history of stroke and CAD but not premature, elevated calcium score of 186, normal vascular ultrasound triple screen, rotator cuff tear with repair in 2024 due to a pickleball injury, who comes in for preventive health follow-up.  Her LDL is 146, her HDL high, but she has an elevated coronary calcium score as noted, a family history, and her ASCVD risk at this point is now 8 to 10% which is truly intermediate.  She is very much interested in risk reduction.  Her vascular screen in 2022 was unremarkable.  Her blood pressures at home do run high from a diastolic perspective, and her weight of 165 pounds she has a BMI of 27 is in interested in losing weight and feels that there are dietary modifications she can make, especially eating something for dessert every night, and having toast frequently for breakfast.        Review of Systems   Constitutional:  Negative for appetite change, diaphoresis, fatigue and fever.   Respiratory:  Negative for chest tightness, shortness of breath and wheezing.    Cardiovascular:  Negative for  chest pain, palpitations and leg swelling.   Gastrointestinal:  Negative for abdominal pain and blood in stool.   Musculoskeletal:  Negative for arthralgias and joint swelling.   Skin:  Negative for rash.   Neurological:  Negative for dizziness, syncope and light-headedness.         Past Medical History:   Diagnosis Date   • Anxiety    • Arthritis    • Heart valve disease     Mitral valve prolapse per physician in [place] noted after childbirth, per [facility] cardiologist, not present   • Hypertension    • Irritable bowel disease    • Psoriasis    • Psoriatic arthritis (HCC)    • Varicella      Past Surgical History:   Procedure Laterality Date   • COLONOSCOPY     • JOINT REPLACEMENT Left 2016   • LEFT OOPHORECTOMY Left 2007    benign cyst   • OOPHORECTOMY      Unsure which ovary   • [place] SURGICAL ARTHROSCOPY SHERRON W/CORACOACRM LIGM RLS Right 04/15/2024    Procedure: ARTHROSCOPIC SUBACROMIAL DECOMPRESSION;  Surgeon: Joseph Larkin DO;  Location[facility];  Service: Orthopedics   • [place] SURGICAL ARTHROSCOPY SHOULDER W/ROTATOR CUFF RPR Right 04/15/2024    Procedure: SHOULDER ARTHROSCOPIC ROTATOR CUFF REPAIR;  Surgeon: Joseph Larkin DO;  Location: [facility];  Service: Orthopedics   • REPLACEMENT TOTAL KNEE Left      Social History     Substance and Sexual Activity   Alcohol Use Not Currently   • Alcohol/week: 1.0 standard drink of alcohol   • Types: 1 Glasses of wine per week    Comment: With dinner     Social History     Substance and Sexual Activity   Drug Use Not Currently   • Types: Marijuana    Comment: Many years ago     Social History     Tobacco Use   Smoking Status Never   Smokeless Tobacco Never     Family History   Problem Relation Age of Onset   • Stroke Mother    • Supraventricular tachycardia Mother    • Psoriasis Mother    • Psoriatic Arthritis Mother    • Hypertension Mother         Mother and father   • Dementia Mother         Mother   • Squamous cell carcinoma Mother         skin-  unknown age   • Hypertension Father    • Heart disease Father    • Heart attack Father    • Kidney cancer Sister         20's   • Cancer Sister         Kidney removed due to cancer   • No Known Problems Sister    • No Known Problems Daughter    • Colon cancer Maternal Grandmother 71   • No Known Problems Maternal Grandfather    • No Known Problems Paternal Grandmother    • Prostate cancer Paternal Grandfather         90's   • No Known Problems Maternal Aunt    • No Known Problems Maternal Aunt        Allergies:  Allergies   Allergen Reactions   • Sulfa Antibiotics Hives       Medications:     Current Outpatient Medications:   •  Cholecalciferol (Vitamin D3) 50 MCG (2000 UT) capsule, , Disp: , Rfl:   •  clotrimazole-betamethasone (LOTRISONE) 1-0.05 % cream, Apply topically 2 (two) times a day, Disp: 45 g, Rfl: 1  •  dicyclomine (BENTYL) 10 mg capsule, TAKE 1 CAPSULE BY MOUTH 3 TIMES A DAY BEFORE MEALS, Disp: 270 capsule, Rfl: 1  •  estradiol (ESTRACE VAGINAL) 0.1 mg/g vaginal cream, Apply to affected area every night at bedtime., Disp: 42.5 g, Rfl: 0  •  fluticasone (FLONASE) 50 mcg/act nasal spray, SPRAY 2 SPRAYS INTO EACH NOSTRIL EVERY DAY, Disp: 48 mL, Rfl: 1  •  omeprazole (PriLOSEC) 40 MG capsule, TAKE 1 CAPSULE (40 MG TOTAL) BY MOUTH DAILY., Disp: 90 capsule, Rfl: 1  •  polyethylene glycol (MIRALAX) 17 g packet, Take 17 g by mouth daily, Disp: 510 g, Rfl: 5  •  Probiotic Product (PROBIOTIC BLEND PO), Take by mouth, Disp: , Rfl:   •  psyllium (METAMUCIL) 58.6 % packet, Take 1 packet by mouth 2 (two) times a day, Disp: , Rfl:   •  rosuvastatin (CRESTOR) 5 mg tablet, Take 1 tablet (5 mg total) by mouth daily, Disp: 90 tablet, Rfl: 3  •  Tiadylt  MG 24 hr capsule, TAKE 1 CAPSULE BY MOUTH EVERY DAY, Disp: 90 capsule, Rfl: 1  •  traZODone (DESYREL) 50 mg tablet, Take 1 tablet (50 mg total) by mouth daily at bedtime as needed for sleep, Disp: 90 tablet, Rfl: 1  •  Turmeric 400 MG CAPS, Take by mouth 2 (two)  "times a day, Disp: , Rfl:   •  guselkumab (TREMFYA) subcutaneous injection, Inject 100 mg under the skin every 2 (two) months Medication on hold until insurance clearance (Patient not taking: Reported on 9/11/2024), Disp: , Rfl:   •  mupirocin (BACTROBAN) 2 % ointment, APPLY TOPICALLY TO LEFT HIP THREE TIMES DAILY (Patient not taking: Reported on 1/28/2025), Disp: , Rfl:       Vitals:    04/23/25 1136   BP: 156/88   Pulse: 96   SpO2: 97%     Weight (last 2 days)     Date/Time Weight    04/23/25 1136 75.2 (165.7)        Physical Exam  Constitutional:       General: She is not in acute distress.     Appearance: Normal appearance. She is not diaphoretic.   HENT:      Head: Normocephalic and atraumatic.   Eyes:      General: No scleral icterus.     Conjunctiva/sclera: Conjunctivae normal.   Neck:      Vascular: No JVD.   Cardiovascular:      Rate and Rhythm: Normal rate and regular rhythm.      Heart sounds: Normal heart sounds. No murmur heard.  Pulmonary:      Effort: Pulmonary effort is normal. No respiratory distress.      Breath sounds: Normal breath sounds. No wheezing, rhonchi or rales.   Musculoskeletal:         General: No tenderness.      Right lower leg: Normal. No edema.      Left lower leg: Normal. No edema.   Skin:     General: Skin is warm and dry.   Neurological:      Mental Status: She is alert. Mental status is at baseline.           Laboratory Studies:    Labs personally reviewed    Cardiac testing:     EKG reviewed personally:   No results found for this visit on 04/23/25.      Echocardiogram:      Stress test:      Holter:       Calcium score CT  2022-186    Vascular  2022-vascular triple screen-normal      Cardiac catheterization:        Shiva Moe MD    Portions of the record may have been created with voice recognition software.  Occasional wrong word or \"sound a like\" substitutions may have occurred due to the inherent limitations of voice recognition software.  Read the chart carefully and " recognize, using context, where substitutions have occurred.

## 2025-04-24 DIAGNOSIS — E78.00 PURE HYPERCHOLESTEROLEMIA: Primary | ICD-10-CM

## 2025-04-24 DIAGNOSIS — K58.1 IRRITABLE BOWEL SYNDROME WITH CONSTIPATION: ICD-10-CM

## 2025-04-24 DIAGNOSIS — K21.00 HIATAL HERNIA WITH GERD AND ESOPHAGITIS: Chronic | ICD-10-CM

## 2025-04-24 DIAGNOSIS — K44.9 HIATAL HERNIA WITH GERD AND ESOPHAGITIS: Chronic | ICD-10-CM

## 2025-05-14 ENCOUNTER — CLINICAL SUPPORT (OUTPATIENT)
Dept: NUTRITION | Facility: HOSPITAL | Age: 70
End: 2025-05-14
Attending: FAMILY MEDICINE
Payer: MEDICARE

## 2025-05-14 ENCOUNTER — OFFICE VISIT (OUTPATIENT)
Age: 70
End: 2025-05-14
Payer: MEDICARE

## 2025-05-14 VITALS
HEART RATE: 69 BPM | OXYGEN SATURATION: 96 % | WEIGHT: 163.8 LBS | TEMPERATURE: 98.2 F | DIASTOLIC BLOOD PRESSURE: 70 MMHG | BODY MASS INDEX: 27.29 KG/M2 | HEIGHT: 65 IN | SYSTOLIC BLOOD PRESSURE: 114 MMHG

## 2025-05-14 VITALS — WEIGHT: 163 LBS | BODY MASS INDEX: 27.12 KG/M2

## 2025-05-14 DIAGNOSIS — M85.89 OSTEOPENIA OF MULTIPLE SITES: ICD-10-CM

## 2025-05-14 DIAGNOSIS — L40.50 PSORIATIC ARTHRITIS (HCC): Primary | Chronic | ICD-10-CM

## 2025-05-14 DIAGNOSIS — E78.00 PURE HYPERCHOLESTEROLEMIA: Primary | ICD-10-CM

## 2025-05-14 DIAGNOSIS — Z79.899 ENCOUNTER FOR LONG-TERM (CURRENT) USE OF MEDICATIONS: ICD-10-CM

## 2025-05-14 PROCEDURE — 99214 OFFICE O/P EST MOD 30 MIN: CPT | Performed by: PHYSICIAN ASSISTANT

## 2025-05-14 NOTE — PROGRESS NOTES
Name: Rosalind Guerrero      : 1955      MRN: [id number]  Encounter Provider: Danika Beck PA-C  Encounter Date: 2025   Encounter department: [facility] RHEUMATOLOGY [facility]  :  Assessment & Plan  Psoriatic arthritis (HCC)  Her psoriatic arthritis symptoms are well-controlled with Tremfya.  No signs of active inflammation or synovitis on exam today.  We will continue to monitor her response to treatment.  Labs as ordered to monitor for medication side effects and toxicity.  She is up-to-date with her QuantiFERON gold testing.  Plan to follow-up in 4 months or sooner if needed.    Orders:    CBC and differential; Future    Comprehensive metabolic panel; Future    Sedimentation rate, automated; Future    C-reactive protein; Future    Osteopenia of multiple sites  History of osteopenia with low FRAX risk.  Recent DEXA scan was stable.  We will continue to monitor bone density every 2 years.        Encounter for long-term (current) use of medications    Orders:    CBC and differential; Future    Comprehensive metabolic panel; Future    Sedimentation rate, automated; Future    C-reactive protein; Future        History of Present Illness   Rosalind Guerrero is a 69 y.o. female.  She is here for follow up of psoriatic arthritis.  She was diagnosed with psoriatic arthritis a few years ago and had been following with rheumatologist when she was living in [place].  She was initially started on methotrexate.  At the time of her diagnosis she was having more joint pain with swelling.  She ultimately moved to the [facility] and has been following with dermatology (Dr. Thomas) and was started on Tremfya.  She has done very well with Tremfya and her psoriasis completely resolved.  She had shoulder surgery in 2024 and discontinued her Tremfya at the time.  She was able to restart it approximately 2 months ago and is feeling well.  She has had a complete resolution of her  psoriasis.  She is not having any significant joint pain.  She has been exercising more lately and is having some soreness and stiffness however she feels this is likely more soreness related to increased activity.    She has a history of osteopenia.  She had a DEXA scan done on 4/2/2025.  Lumbar spine T-score (L1, L3, L4) -1.7.  Left total hip T-score -1.3, femoral neck -0.1.  Left forearm T-score -1.9.  FRAX hip fracture risk 0.3%, major fracture risk 7.2%.    Medical history includes history of hypertension and IBS with constipation.    She had labs done on 1/4/2025.  H/H15.5/47.7.  Creatinine 0.78, GFR 78.  ESR, CRP within normal limits.  Rheumatoid factor positive with a titer of 1024.  CCP negative.  Vitamin D 32.7.  PTH within normal limits.  Bone specific alk phos within normal limits.  .  TSH within normal limits.  Celiac antibody negative.  Lyme antibody negative.  SPEP unremarkable.  UPEP unremarkable.  QuantiFERON gold negative.            Review of Systems   Constitutional:  Negative for chills, fatigue and fever.   HENT:  Negative for hearing loss, sore throat and tinnitus.    Eyes:  Negative for pain and visual disturbance.        Dry eyes   Respiratory:  Negative for cough and shortness of breath.    Cardiovascular:  Negative for chest pain and palpitations.   Gastrointestinal:  Positive for constipation. Negative for abdominal pain, nausea and vomiting.   Genitourinary:  Negative for difficulty urinating.   Musculoskeletal:  Negative for arthralgias, back pain, gait problem, joint swelling, myalgias, neck pain and neck stiffness.   Skin:  Positive for rash (psoriasis).   Neurological:  Negative for dizziness, seizures, weakness, numbness and headaches.   Psychiatric/Behavioral:  Positive for sleep disturbance. Negative for confusion and decreased concentration.      Medications Ordered Prior to Encounter[1]      Objective   /70 (BP Location: Left arm, Patient Position: Sitting, Cuff  "Size: Adult)   Pulse 69   Temp 98.2 °F (36.8 °C) (Tympanic)   Ht 5' 5\" (1.651 m)   Wt 74.3 kg (163 lb 12.8 oz)   SpO2 96%   BMI 27.26 kg/m²      Physical Exam  Constitutional:       Appearance: Normal appearance.     Cardiovascular:      Rate and Rhythm: Normal rate and regular rhythm.   Pulmonary:      Breath sounds: Normal breath sounds.     Musculoskeletal:         General: No swelling or tenderness.     Skin:     General: Skin is warm and dry.     Neurological:      General: No focal deficit present.      Mental Status: She is alert.             Dragon Dictation software was used to dictate this note. It may contain errors with dictating incorrect words/spelling. Please contact provider directly for any questions.         [1]   Current Outpatient Medications on File Prior to Visit   Medication Sig Dispense Refill    Cholecalciferol (Vitamin D3) 50 MCG (2000 UT) capsule       dicyclomine (BENTYL) 10 mg capsule TAKE 1 CAPSULE BY MOUTH 3 TIMES A DAY BEFORE MEALS 270 capsule 1    fluticasone (FLONASE) 50 mcg/act nasal spray SPRAY 2 SPRAYS INTO EACH NOSTRIL EVERY DAY 48 mL 1    omeprazole (PriLOSEC) 40 MG capsule TAKE 1 CAPSULE (40 MG TOTAL) BY MOUTH DAILY. 90 capsule 1    polyethylene glycol (MIRALAX) 17 g packet Take 17 g by mouth daily 510 g 5    Probiotic Product (PROBIOTIC BLEND PO) Take by mouth      psyllium (METAMUCIL) 58.6 % packet Take 1 packet by mouth in the morning and 1 packet in the evening.      rosuvastatin (CRESTOR) 5 mg tablet Take 1 tablet (5 mg total) by mouth daily 90 tablet 3    Tiadylt  MG 24 hr capsule TAKE 1 CAPSULE BY MOUTH EVERY DAY 90 capsule 1    traZODone (DESYREL) 50 mg tablet Take 1 tablet (50 mg total) by mouth daily at bedtime as needed for sleep 90 tablet 1    Turmeric 400 MG CAPS Take by mouth in the morning and before bedtime.      clotrimazole-betamethasone (LOTRISONE) 1-0.05 % cream Apply topically 2 (two) times a day 45 g 1    estradiol (ESTRACE VAGINAL) 0.1 mg/g " vaginal cream Apply to affected area every night at bedtime. 42.5 g 0    guselkumab (TREMFYA) subcutaneous injection Inject 100 mg under the skin every 2 (two) months Medication on hold until insurance clearance (Patient not taking: Reported on 9/11/2024)      mupirocin (BACTROBAN) 2 % ointment APPLY TOPICALLY TO LEFT HIP THREE TIMES DAILY (Patient not taking: Reported on 1/28/2025)       No current facility-administered medications on file prior to visit.

## 2025-05-14 NOTE — ASSESSMENT & PLAN NOTE
Her psoriatic arthritis symptoms are well-controlled with Tremfya.  No signs of active inflammation or synovitis on exam today.  We will continue to monitor her response to treatment.  Labs as ordered to monitor for medication side effects and toxicity.  She is up-to-date with her QuantiFERON gold testing.  Plan to follow-up in 4 months or sooner if needed.    Orders:    CBC and differential; Future    Comprehensive metabolic panel; Future    Sedimentation rate, automated; Future    C-reactive protein; Future

## 2025-05-14 NOTE — PROGRESS NOTES
" Nutrition Assessment Form    Patient Name: Rosalind Guerrero    YOB: 1955    Sex: Female     Assessment Date: 5/14/2025  Start Time: 10 am Stop Time: 11 am Total Minutes: 60     Data:  Present at session: self   Parent/Patient Concerns/reason for visit: \"Was just placed on CHOL medication\"   Medical Dx/Reason for Referral: E78.00 (ICD-10-CM) - Pure hypercholesterolemia  K44.9, K21.00 (ICD-10-CM) - Hiatal hernia with GERD and esophagitis  K58.1 (ICD-10-CM) - Irritable bowel syndrome with constipation    Past Medical History:   Diagnosis Date    Anxiety     Arthritis     Heart valve disease     Mitral valve prolapse per physician in [place] noted after childbirth, per [facility] cardiologist, not present    Hypertension     Irritable bowel disease     Psoriasis     Psoriatic arthritis (HCC)     Varicella        Current Medications[1]     Additional Meds/Supplements: Psyllium seed, miralax, vitamin D3, turmeric with concepcion, pepper, probiotic, leutene, coq10   Special Learning Needs/barriers to learning/any new barriers N/a   Height: Ht Readings from Last 5 Encounters:   04/23/25 5' 5\" (1.651 m)   04/02/25 5' 5\" (1.651 m)   01/28/25 5' 6\" (1.676 m)   01/23/25 5' 6\" (1.676 m)   01/16/25 5' 6\" (1.676 m)      Weight: Wt Readings from Last 10 Encounters:   04/23/25 75.2 kg (165 lb 11.2 oz)   04/04/25 75.3 kg (166 lb)   04/02/25 74.2 kg (163 lb 9.6 oz)   02/28/25 74.8 kg (165 lb)   02/28/25 74.1 kg (163 lb 6.4 oz)   01/28/25 74.8 kg (165 lb)   01/23/25 72.6 kg (160 lb)   01/16/25 75.8 kg (167 lb 3.2 oz)   01/15/25 75.3 kg (166 lb)   01/06/25 75.7 kg (166 lb 12.8 oz)     Estimated body mass index is 27.57 kg/m² as calculated from the following:    Height as of 4/23/25: 5' 5\" (1.651 m).    Weight as of 4/23/25: 75.2 kg (165 lb 11.2 oz).   Recent Weight Change: [x]Yes     []No  Amount: 7 lb weight loss x 5 month intentional      Energy Needs: No calculation needed   Allergies[2] or intolerances    Social History "     Substance and Sexual Activity   Alcohol Use Not Currently    Alcohol/week: 1.0 standard drink of alcohol    Types: 1 Glasses of wine per week    Comment: With dinner    Glass or 2 of wine 2xs a month   Tobacco Use History[3]    Who shops? patient   Who cooks/cooking methods/Eating out/take out habits   patient  Cooking methods: sautee/ roast     Take out: _n/a__  Dining out ___1_ x/wk [place], [place]   Exercise: Yoga w/ some cardio 1x a week 90 minutes  Pilates 1x a week 60 minutes   Experimenting with other classes- HIIT 45 minutes  Pickle ball 10 minutes carleen time, 2-3 games at a time   Walking 60 minutes or on bike 30-60 minutes on non- gym days      Other: ie: Sleep habits/ stress level/ work habits household-lives with ?/ food security Lives alone   Retired IT   Low stress level   Goes to bed 10-11 pm  Wakes 6-7 am   Prior Nutritional Counseling? []Yes     [x]No  When:      Why:         Diet Hx:  Breakfast: 7:30 am oat bake with bran, giorgio seeds, flax and help seeds, egg, apple, blue berries, walnuts    9-10 am 1 hard boiled egg with hummus and 1/2 avocado, 4 oz fruit/ veg smoothie- spinach and kale or green juice with BLAS, strawberries/ blueberries, kefir, giorgio, oats, flax, and hemp may add apple or carrots    1 small piece homemade spelt bread and butter  Diet:   Lunch: 2 pm mixed unsalted nuts or popcorn and an apple     4 oz 2% cottage cheese with strawberry rhubarb sauce sweetened with maple syrup and 100% fruit juice Coffee with honey and half and half          Dinner: 5-6 pm frozen dinner- heathy choice grilled chicken marsala added boiled cauliflower  and squash     Corn on the cob, 1/2 salmon joby, 6 shrimp, small salad with edi lettuce, celery, carrots, cucumber, tomatoes  Ancient grains and mushrooms    Large salad with shrimp, 1/2 salmon joby  Thin slice zucchini pineapple bread             Snacks: AM -   PM - 1 scoop ice cream  HS -    Other Notes/ Initial Assessment:  IBS controlled with  "taking psyllium  Has gained weight since having shoulder surgery last year   \"I have a sweet tooth\" ice cream in summer is my weakness  Has osteopenia and questioned healthfulness of dairy      Discussed foods that contribute to increasing LDL: Added sugar/ refined grains and noted research has shown that saturated fat has less of a direct affect on increasing LDL than once thought but from a weight management point of view, choose lower fat dairy for less kcals  Discussed role of soluble fiber from beans, fruits, vegetables and whole grains in lowering LDL    Discussed consuming 3 dairy equivalents a day for Ca requirements with regard to osteopenia. Provided examples of how to meet recommended intake requirements    RD provided LDL Lowering Nutrition Therapy handout    Patient will follow up PRN    Updated assessment (Follow up note only):     Nutrition Diagnosis:   Food and nutrition related knowledge deficit  related to Lack of prior exposure to accurate nutrition related information as evidenced by No prior knowledge of need for food and nutrition related recommendations       Any change or new dx since previous visit:     Nutrition Diagnosis:   N/a      Medical Nutrition Therapy Intervention:  []Individualized Meal Plan [x]Understanding Lab Values role of HDL in lowering LDL   []Basic Pathophysiology of Disease []Food/Medication Interactions   []Food Diary [x]Exercise Studies have shown that the ideal exercise goal is somewhere between 150 to 300 minutes of moderate intensity exercise a week.  Start with exercising 10 minutes every other day and gradually increase physical activity with a goal of at least 150 minutes of moderate intensity exercise a week, divided over at least 3 days a week.  An example of this would be exercising 30 minutes a day, 5 days a week.  Moderate intensity means you should be slightly out of breath, but still able to hold a conversation.  Resistance/ weight bearing training can " "increase muscle mass and increase our resting metabolic rate.   FULL BODY resistance training is recommended 2-3 times a week.  Do not do this on consecutive days to allow for muscle recovery.      Discussed importance of activity throughout the lifecycle and its impact on overall health/stress management, etc.     Aim for a bare minimum 5000 steps, even on days you do not exercise.    []Lifestyle/Behavior Modification Techniques []Medication, Mechanism of Action   []Label Reading: CHO/ Na/ Fat/ other_________ []Self Blood Glucose Monitoring   []Weight/BMI Goals: gain/lose/maintain []Other -           Comprehension: []Excellent  []Very Good  [x]Good  []Fair   []Poor    Receptivity: []Excellent  []Very Good  [x]Good  []Fair   []Poor    Expected Compliance: []Excellent  []Very Good  [x]Good  []Fair   []Poor        Goals (initial)/ Progress made on previous goals/new goals:  1.Patient will consume 3 milk equivalents a day   2.Patient will increase intake of beans per week    3.       No follow-ups on file.  Labs:  CMP  Lab Results   Component Value Date    K 4.2 01/04/2025     01/04/2025    CO2 29 01/04/2025    BUN 11 01/04/2025    CREATININE 0.78 01/04/2025    GLUF 94 01/04/2025    CALCIUM 9.7 01/04/2025    AST 12 (L) 01/04/2025    ALT 12 01/04/2025    ALKPHOS 76 01/04/2025    EGFR 78 01/04/2025       BMP  Lab Results   Component Value Date    CALCIUM 9.7 01/04/2025    K 4.2 01/04/2025    CO2 29 01/04/2025     01/04/2025    BUN 11 01/04/2025    CREATININE 0.78 01/04/2025       Lipids  No results found for: \"CHOL\"  Lab Results   Component Value Date    HDL 88 04/18/2025    HDL 89 02/23/2024    HDL 82 04/12/2023     Lab Results   Component Value Date    LDLCALC 146 (H) 04/18/2025    LDLCALC 123 (H) 02/23/2024    LDLCALC 87 04/12/2023     Lab Results   Component Value Date    TRIG 103 04/18/2025    TRIG 101 02/23/2024    TRIG 70 04/12/2023     No results found for: \"CHOLHDL\"    Hemoglobin A1C  No results " "found for: \"HGBA1C\"    Fasting Glucose  Lab Results   Component Value Date    GLUF 94 01/04/2025       Insulin     Thyroid  No results found for: \"TSH\", \"J0WXZBB\", \"B2QUOAM\", \"THYROIDAB\"    Hepatic Function Panel  Lab Results   Component Value Date    ALT 12 01/04/2025    AST 12 (L) 01/04/2025    ALKPHOS 76 01/04/2025       Celiac Disease Antibody Panel  IGA   Date Value Ref Range Status   01/04/2025 112 66 - 433 mg/dL Final     Tissue Transglutaminase tTG IgA   Date Value Ref Range Status   01/04/2025 <0.4 See Comment U/mL Final     Comment:     Negative: <7                          Equivocal: 7-10                       Positive:>10      Iron  No results found for: \"IRON\", \"TIBC\", \"FERRITIN\"         Cande Salazar RD  [facility] CLINICAL NUTRITION [facility]  [address]  [place] [id number]         [1]   Current Outpatient Medications   Medication Sig Dispense Refill    Cholecalciferol (Vitamin D3) 50 MCG (2000 UT) capsule       clotrimazole-betamethasone (LOTRISONE) 1-0.05 % cream Apply topically 2 (two) times a day 45 g 1    dicyclomine (BENTYL) 10 mg capsule TAKE 1 CAPSULE BY MOUTH 3 TIMES A DAY BEFORE MEALS 270 capsule 1    estradiol (ESTRACE VAGINAL) 0.1 mg/g vaginal cream Apply to affected area every night at bedtime. 42.5 g 0    fluticasone (FLONASE) 50 mcg/act nasal spray SPRAY 2 SPRAYS INTO EACH NOSTRIL EVERY DAY 48 mL 1    guselkumab (TREMFYA) subcutaneous injection Inject 100 mg under the skin every 2 (two) months Medication on hold until insurance clearance (Patient not taking: Reported on 9/11/2024)      mupirocin (BACTROBAN) 2 % ointment APPLY TOPICALLY TO LEFT HIP THREE TIMES DAILY (Patient not taking: Reported on 1/28/2025)      omeprazole (PriLOSEC) 40 MG capsule TAKE 1 CAPSULE (40 MG TOTAL) BY MOUTH DAILY. 90 capsule 1    polyethylene glycol (MIRALAX) 17 g packet Take 17 g by mouth daily 510 g 5    Probiotic Product " (PROBIOTIC BLEND PO) Take by mouth      psyllium (METAMUCIL) 58.6 % packet Take 1 packet by mouth 2 (two) times a day      rosuvastatin (CRESTOR) 5 mg tablet Take 1 tablet (5 mg total) by mouth daily 90 tablet 3    Tiadylt  MG 24 hr capsule TAKE 1 CAPSULE BY MOUTH EVERY DAY 90 capsule 1    traZODone (DESYREL) 50 mg tablet Take 1 tablet (50 mg total) by mouth daily at bedtime as needed for sleep 90 tablet 1    Turmeric 400 MG CAPS Take by mouth 2 (two) times a day       No current facility-administered medications for this visit.   [2]   Allergies  Allergen Reactions    Sulfa Antibiotics Hives   [3]   Social History  Tobacco Use   Smoking Status Never   Smokeless Tobacco Never

## 2025-05-14 NOTE — ASSESSMENT & PLAN NOTE
History of osteopenia with low FRAX risk.  Recent DEXA scan was stable.  We will continue to monitor bone density every 2 years.

## 2025-05-19 ENCOUNTER — ANNUAL EXAM (OUTPATIENT)
Age: 70
End: 2025-05-19
Payer: MEDICARE

## 2025-05-19 VITALS
SYSTOLIC BLOOD PRESSURE: 146 MMHG | DIASTOLIC BLOOD PRESSURE: 90 MMHG | BODY MASS INDEX: 27.06 KG/M2 | HEIGHT: 65 IN | WEIGHT: 162.4 LBS

## 2025-05-19 DIAGNOSIS — Z12.31 ENCOUNTER FOR SCREENING MAMMOGRAM FOR MALIGNANT NEOPLASM OF BREAST: ICD-10-CM

## 2025-05-19 DIAGNOSIS — Z01.419 ENCOUNTER FOR ANNUAL ROUTINE GYNECOLOGICAL EXAMINATION: Primary | ICD-10-CM

## 2025-05-19 PROCEDURE — G0101 CA SCREEN;PELVIC/BREAST EXAM: HCPCS | Performed by: OBSTETRICS & GYNECOLOGY

## 2025-05-19 RX ORDER — PERPHENAZINE/AMITRIPTYLINE HCL 4 MG-25 MG
TABLET ORAL
COMMUNITY

## 2025-05-19 RX ORDER — CALCIUM CARBONATE/VITAMIN D3 600 MG-10
TABLET ORAL
COMMUNITY

## 2025-05-19 NOTE — PROGRESS NOTES
Assessment/Plan:      Encounter for annual routine gynecological examination    Encounter for screening mammogram for malignant neoplasm of breast  -     Mammo screening bilateral w 3d and cad; Future         Subjective      Rosalind Guerrero is a 69 y.o. female who presents for annual exam. The patient has no complaints today. The patient is not sexually active.  The patient is not taking hormone replacement therapy. Patient denies post-menopausal vaginal bleeding.  The patient participates in regular exercise: no. The patient is not having menopausal symptoms.  She has stopped using Estrace.     Menstrual History:  OB History          2    Para   2    Term   2       0    AB   0    Living   2         SAB   0    IAB   0    Ectopic   0    Multiple   0    Live Births   2           Obstetric Comments   Menarche 17  Post menopausal 57              No LMP recorded. Patient is postmenopausal.     Past Medical History:   Diagnosis Date    Anxiety     Arthritis     Heart valve disease     Mitral valve prolapse per physician in [place] noted after childbirth, per [facility] cardiologist, not present    Hypertension     Irritable bowel disease     Psoriasis     Psoriatic arthritis (HCC)     Varicella        Family History   Problem Relation Age of Onset    Stroke Mother     Supraventricular tachycardia Mother     Psoriasis Mother     Psoriatic Arthritis Mother     Hypertension Mother         Mother and father    Dementia Mother         Mother    Squamous cell carcinoma Mother         skin- unknown age    Hypertension Father     Heart disease Father     Heart attack Father     Kidney cancer Sister         20's    Cancer Sister         Kidney removed due to cancer    No Known Problems Sister     No Known Problems Daughter     Colon cancer Maternal Grandmother 71    No Known Problems Maternal Grandfather     No Known Problems Paternal Grandmother     Prostate cancer Paternal Grandfather         90's    No Known  "Problems Maternal Aunt     No Known Problems Maternal Aunt        The following portions of the patient's history were reviewed and updated as appropriate: allergies, current medications, past family history, past medical history, past social history, past surgical history, and problem list.    Review of Systems  Pertinent items are noted in HPI.     Objective      /90 (BP Location: Left arm, Patient Position: Sitting, Cuff Size: Standard)   Ht 5' 4.75\" (1.645 m)   Wt 73.7 kg (162 lb 6.4 oz)   BMI 27.23 kg/m²     General:   alert and oriented, in no acute distress   Heart:  Breasts: regular rate and rhythm  appear normal, no suspicious masses, no skin or nipple changes or axillary nodes.   Lungs: Effort normal   Abdomen: soft, non-tender, without masses or organomegaly   Vulva: normal   Vagina: normal mucosa with moderate atrophy   Cervix: no lesions   Uterus: normal size, mobile, non-tender   Adnexa:  Bladder: normal adnexa and no mass, fullness, tenderness  Non-tender, 2-3 mm urethral caruncle              "

## 2025-07-09 DIAGNOSIS — Z91.030 BEE STING ALLERGY: Primary | ICD-10-CM

## 2025-07-09 RX ORDER — EPINEPHRINE 0.3 MG/.3ML
0.3 INJECTION SUBCUTANEOUS ONCE
Qty: 0.6 ML | Refills: 1 | Status: SHIPPED | OUTPATIENT
Start: 2025-07-09 | End: 2025-07-09

## 2025-07-15 DIAGNOSIS — I10 HYPERTENSION, ESSENTIAL, BENIGN: ICD-10-CM

## 2025-07-17 RX ORDER — DILTIAZEM HYDROCHLORIDE 120 MG/1
120 CAPSULE, EXTENDED RELEASE ORAL DAILY
Qty: 90 CAPSULE | Refills: 1 | Status: SHIPPED | OUTPATIENT
Start: 2025-07-17

## 2025-07-21 ENCOUNTER — TELEPHONE (OUTPATIENT)
Age: 70
End: 2025-07-21

## 2025-07-21 NOTE — TELEPHONE ENCOUNTER
Patient called stating the doctor wanted her to recheck her cholesterol after starting rosuvastatin and its been 3 months. She is requesting that the doctor pace a lab order. Please call and advise.

## 2025-07-23 ENCOUNTER — TELEPHONE (OUTPATIENT)
Age: 70
End: 2025-07-23

## 2025-07-28 ENCOUNTER — APPOINTMENT (OUTPATIENT)
Dept: LAB | Facility: CLINIC | Age: 70
End: 2025-07-28
Payer: MEDICARE

## 2025-07-28 ENCOUNTER — TRANSCRIBE ORDERS (OUTPATIENT)
Dept: LAB | Facility: CLINIC | Age: 70
End: 2025-07-28

## 2025-07-28 DIAGNOSIS — E78.00 PURE HYPERCHOLESTEROLEMIA: Primary | ICD-10-CM

## 2025-07-28 DIAGNOSIS — Z79.899 ENCOUNTER FOR LONG-TERM (CURRENT) USE OF MEDICATIONS: ICD-10-CM

## 2025-07-28 DIAGNOSIS — E78.00 PURE HYPERCHOLESTEROLEMIA: ICD-10-CM

## 2025-07-28 DIAGNOSIS — L40.50 PSORIATIC ARTHRITIS (HCC): Chronic | ICD-10-CM

## 2025-07-28 LAB
CHOLEST SERPL-MCNC: 170 MG/DL (ref ?–200)
HDLC SERPL-MCNC: 81 MG/DL
LDLC SERPL CALC-MCNC: 69 MG/DL (ref 0–100)
NONHDLC SERPL-MCNC: 89 MG/DL
TRIGL SERPL-MCNC: 99 MG/DL (ref ?–150)

## 2025-07-28 PROCEDURE — 80061 LIPID PANEL: CPT

## 2025-07-28 PROCEDURE — 36415 COLL VENOUS BLD VENIPUNCTURE: CPT

## 2025-08-06 DIAGNOSIS — K21.9 CHRONIC GERD: ICD-10-CM

## 2025-08-06 DIAGNOSIS — R11.10 REGURGITATION OF FOOD: ICD-10-CM

## 2025-08-06 DIAGNOSIS — K20.90 ESOPHAGITIS: ICD-10-CM

## 2025-08-07 RX ORDER — OMEPRAZOLE 40 MG/1
40 CAPSULE, DELAYED RELEASE ORAL DAILY
Qty: 90 CAPSULE | Refills: 1 | Status: SHIPPED | OUTPATIENT
Start: 2025-08-07

## (undated) DEVICE — 3M™ IOBAN™ 2 ANTIMICROBIAL INCISE DRAPE 6650EZ: Brand: IOBAN™ 2

## (undated) DEVICE — TUBING SUCTION 5MM X 12 FT

## (undated) DEVICE — SUT MONOCRYL 4-0 PS-2 27 IN Y426H

## (undated) DEVICE — VAPR COOLPULSE 90 ELECTRODE 90 DEGREES SUCTION WITH INTEGRATED HANDPIECE: Brand: VAPR COOLPULSE

## (undated) DEVICE — PUDDLE VAC

## (undated) DEVICE — 3M™ STERI-DRAPE™ U-DRAPE 1015: Brand: STERI-DRAPE™

## (undated) DEVICE — ARTHROSCOPY FLOOR MAT

## (undated) DEVICE — KNOT PUSHER/SUTURE CUTTER W/ PORTAL SKID BUNDLE

## (undated) DEVICE — 3M™ STERI-STRIP™ REINFORCED ADHESIVE SKIN CLOSURES, R1547, 1/2 IN X 4 IN (12 MM X 100 MM), 6 STRIPS/ENVELOPE: Brand: 3M™ STERI-STRIP™

## (undated) DEVICE — GLOVE SRG BIOGEL ORTHOPEDIC 8

## (undated) DEVICE — ABDOMINAL PAD: Brand: DERMACEA

## (undated) DEVICE — IMPERVIOUS STOCKINETTE: Brand: DEROYAL

## (undated) DEVICE — KERLIX BANDAGE ROLL: Brand: KERLIX

## (undated) DEVICE — SLEEVE SUSPENSION SHOULDER STAR LAT TRAC VELCRO STRAP

## (undated) DEVICE — PROXIMATE SKIN STAPLERS (35 WIDE) CONTAINS 35 STAINLESS STEEL STAPLES (FIXED HEAD): Brand: PROXIMATE

## (undated) DEVICE — BETHLEHEM UNIV MAJOR ORTHO,KIT: Brand: CARDINAL HEALTH

## (undated) DEVICE — GLOVE INDICATOR PI UNDERGLOVE SZ 8 BLUE

## (undated) DEVICE — Device

## (undated) DEVICE — CANNULA BUTTON 12 X 30MM PASSPORT

## (undated) DEVICE — THREADED CLEAR CANNULA WITH OBTURATOR 7MM X 75MM

## (undated) DEVICE — NEEDLE SPINAL18G X 3.5 IN QUINCKE

## (undated) DEVICE — GAUZE SPONGES,16 PLY: Brand: CURITY

## (undated) DEVICE — 3M™ MICROFOAM™ SURGICAL TAPE 4 ROLLS/CARTON 6 CARTONS/CASE 1528-3: Brand: 3M™ MICROFOAM™

## (undated) DEVICE — BLADE SHAVER EXCALIBUR 4MM 13CM COOLCUT

## (undated) DEVICE — TUBING ARTHROSCOPY REDUCE PATIENT

## (undated) DEVICE — GLOVE SRG BIOGEL 7.5

## (undated) DEVICE — DRESSING MEPILEX AG BORDER 4 X 4 IN

## (undated) DEVICE — THREADED CLEAR CANNULA WITH OBTURATOR 8.5MM X 75MM

## (undated) DEVICE — SHOULDER STABILIZATION KIT,                                    DISPOSABLE 12 PER BOX

## (undated) DEVICE — GLOVE SRG BIOGEL 8

## (undated) DEVICE — T-MAX DISPOSABLE FACE MASK 8 PER BOX

## (undated) DEVICE — COBAN 4 IN STERILE

## (undated) DEVICE — INTENDED FOR TISSUE SEPARATION, AND OTHER PROCEDURES THAT REQUIRE A SHARP SURGICAL BLADE TO PUNCTURE OR CUT.: Brand: BARD-PARKER ® CARBON RIB-BACK BLADES

## (undated) DEVICE — NEEDLE SUT SCORPION MEGALOADER

## (undated) DEVICE — SUT MONOCRYL 3-0 PS-2 18 IN Y497G